# Patient Record
Sex: MALE | Race: WHITE | NOT HISPANIC OR LATINO | Employment: FULL TIME | URBAN - METROPOLITAN AREA
[De-identification: names, ages, dates, MRNs, and addresses within clinical notes are randomized per-mention and may not be internally consistent; named-entity substitution may affect disease eponyms.]

---

## 2017-02-06 DIAGNOSIS — I10 HTN (HYPERTENSION): Primary | ICD-10-CM

## 2017-02-06 RX ORDER — METOPROLOL TARTRATE 50 MG
25 TABLET ORAL 2 TIMES DAILY
Qty: 90 TABLET | Refills: 0 | Status: SHIPPED | OUTPATIENT
Start: 2017-02-06 | End: 2017-05-04

## 2017-05-04 ENCOUNTER — OFFICE VISIT (OUTPATIENT)
Dept: CARDIOLOGY | Facility: CLINIC | Age: 57
End: 2017-05-04
Payer: COMMERCIAL

## 2017-05-04 VITALS
BODY MASS INDEX: 28.34 KG/M2 | SYSTOLIC BLOOD PRESSURE: 96 MMHG | HEART RATE: 64 BPM | WEIGHT: 232.7 LBS | DIASTOLIC BLOOD PRESSURE: 62 MMHG | HEIGHT: 76 IN

## 2017-05-04 DIAGNOSIS — I51.9 LV DYSFUNCTION: ICD-10-CM

## 2017-05-04 DIAGNOSIS — I48.19 PERSISTENT ATRIAL FIBRILLATION (H): Primary | ICD-10-CM

## 2017-05-04 DIAGNOSIS — I10 ESSENTIAL HYPERTENSION: ICD-10-CM

## 2017-05-04 PROCEDURE — 99213 OFFICE O/P EST LOW 20 MIN: CPT | Performed by: INTERNAL MEDICINE

## 2017-05-04 RX ORDER — METOPROLOL TARTRATE 50 MG
25 TABLET ORAL 2 TIMES DAILY
Qty: 90 TABLET | Refills: 0 | Status: SHIPPED | OUTPATIENT
Start: 2017-05-04 | End: 2017-05-22

## 2017-05-04 RX ORDER — LISINOPRIL 2.5 MG/1
2.5 TABLET ORAL DAILY
Qty: 90 TABLET | Refills: 3 | Status: SHIPPED | OUTPATIENT
Start: 2017-05-04 | End: 2017-05-22

## 2017-05-04 NOTE — LETTER
5/4/2017    Marc Mahmood MD  [facility]   [address] [facility] [address]  [facility] [address]    RE: John Patel       Dear Colleague,    I had the pleasure of seeing John Patel in the [facility].    REASON FOR VISIT:  Evaluation of persistent atrial fibrillation.      Ms. Patel is a pleasant 57-year-old gentleman with a history of persistent atrial fibrillation who is here for evaluation.      The patient was initially found to have atrial fibrillation on a routine physical exam (10/2015).  At that time, echo revealed mild cardiomyopathy (EF around 40%-45%).  He was started on amiodarone and Eliquis and underwent cardioversion in 12/2015.  Post cardioversion, he had a recurrence of atrial fibrillation and since he was asymptomatic and failed amiodarone therapy, a decision was made to continue with rate control strategy.        I last saw him in March and at that time he was doing well and I repeated an echo, which confirmed LV dysfunction despite good controlled heart rate.  He was then started on lisinopril.      At the moment he affirms that he continues to do well and swims on a regular basis; however, he does complains of being more fatigued than usual.  When he plays golf, he feels extremely fatigued and needs a couple days to recover.      Outpatient Encounter Prescriptions as of 5/4/2017   Medication Sig Dispense Refill     [DISCONTINUED] lisinopril (PRINIVIL/ZESTRIL) 2.5 MG tablet Take 1 tablet (2.5 mg) by mouth daily 90 tablet 3     [DISCONTINUED] metoprolol (LOPRESSOR) 50 MG tablet Take 0.5 tablets (25 mg) by mouth 2 times daily 90 tablet 0     [DISCONTINUED] aspirin 81 MG tablet Take 81 mg by mouth daily       Multiple Vitamins-Minerals (DAILY MULTIVITAMIN PO) Take by mouth daily       Ascorbic Acid (VITAMIN C PO) Take by mouth daily        Omega-3 Fatty Acids (OMEGA-3 FISH OIL PO) Take 2 g by mouth daily        [DISCONTINUED] metoprolol  (LOPRESSOR) 50 MG tablet Take 0.5 tablets (25 mg) by mouth 2 times daily 90 tablet 0     [DISCONTINUED] oxyCODONE-acetaminophen (PERCOCET) 5-325 MG per tablet Take 1-2 tablets by mouth every 4 hours as needed for pain (moderate to severe) 30 tablet 0     [DISCONTINUED] lisinopril (PRINIVIL,ZESTRIL) 2.5 MG tablet Take 1 tablet (2.5 mg) by mouth daily 90 tablet 3     No facility-administered encounter medications on file as of 5/4/2017.      PLAN:   1.  Persistent atrial fibrillation on rate control strategy.  I recommend repeating a monitor and echocardiogram.  We will continue current medical therapy for now until we get the results of the studies.   2.  Cardiomyopathy.  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  A previous stress echo was negative for ischemia.  We will repeat an echocardiogram.  Continue metoprolol.   3.  CHADS-VASc score of 1 (cardiomyopathy).  We will reevaluate cardiac function.  If his LV function is still depressed, we may consider switching to a blood thinner.   4.  Followup care.  We will discuss followup after echo results.     Again, thank you for allowing me to participate in the care of your patient.      Sincerely,    Dimitri Jones MD     [facility]

## 2017-05-04 NOTE — PROGRESS NOTES
"HPI and Plan:   See dictation  [id number]    Physical Exam:  Vitals: BP 96/62  Pulse 64  Ht 1.93 m (6' 4\")  Wt 105.6 kg (232 lb 11.2 oz)  BMI 28.33 kg/m2    Constitutional:  AAO x3.  Pt is in NAD.  HEAD: normocephalic.  SKIN: Skin normal color, texture and turgor with no lesions or eruptions.  Eyes: PERRL, EOMI.  ENT:  Supple, normal JVP. No lymphadenopathy or thyroid enlargement.  Chest:  CTAB.  Cardiac: RRR, normal  S1 and S2.  No murmurs rubs or gallop.    Abdomen:  Normal BS.  Soft, non-tender and non-distended.  No rebound or guarding.    Extremities:  Pedious pulses palpable B/L.  No LE edema noticed.   Neurological: Strength and sensation grossly symmetric and intact throughout.       CURRENT MEDICATIONS:  Current Outpatient Prescriptions   Medication Sig Dispense Refill     lisinopril (PRINIVIL/ZESTRIL) 2.5 MG tablet Take 1 tablet (2.5 mg) by mouth daily 90 tablet 3     metoprolol (LOPRESSOR) 50 MG tablet Take 0.5 tablets (25 mg) by mouth 2 times daily 90 tablet 0     aspirin 81 MG tablet Take 81 mg by mouth daily       Multiple Vitamins-Minerals (DAILY MULTIVITAMIN PO) Take by mouth daily       Ascorbic Acid (VITAMIN C PO) Take by mouth daily        Omega-3 Fatty Acids (OMEGA-3 FISH OIL PO) Take 2 g by mouth daily        [DISCONTINUED] metoprolol (LOPRESSOR) 50 MG tablet Take 0.5 tablets (25 mg) by mouth 2 times daily 90 tablet 0     [DISCONTINUED] lisinopril (PRINIVIL,ZESTRIL) 2.5 MG tablet Take 1 tablet (2.5 mg) by mouth daily 90 tablet 3       ALLERGIES     Allergies   Allergen Reactions     No Known Drug Allergies        PAST MEDICAL HISTORY:  Past Medical History:   Diagnosis Date     Allergic rhinitis, cause unspecified      Arrhythmia      Atrial fibrillation (H)      Cardiomyopathy (H)      Colon polyp     pre-cancerous and non-cancerous     Hypertension     [facility]     Snores        PAST SURGICAL HISTORY:  Past Surgical History:   Procedure Laterality Date     ANESTHESIA CARDIOVERSION N/A " 2015    Procedure: ANESTHESIA CARDIOVERSION;  Surgeon: GENERIC ANESTHESIA PROVIDER;  Location: [facility] OR     COLONOSCOPY N/A 11/10/2015    Procedure: COLONOSCOPY;  Surgeon: Edin Wallace MD, MD;  Location: [facility] GI     HERNIORRHAPHY INGUINAL  2011    Procedure:HERNIORRHAPHY INGUINAL; LEFT INGUINAL HERNIA REPAIR WITH MESH; Surgeon:SYLVESTER KOEHLER; Location: [facility]     HERNIORRHAPHY INGUINAL Right 2016    Procedure: HERNIORRHAPHY INGUINAL;  Surgeon: Sixto Wolf MD;  Location: [facility] OR     ORTHOPEDIC SURGERY  ~    bunionectomy on right       FAMILY HISTORY:  Family History   Problem Relation Age of Onset     DIABETES Father       age 90     Respiratory Father      COPD-exsmoker     Breast Cancer Mother      DIABETES Mother      Born 1929     DIABETES Brother      Oldest brother has DM     Thyroid Disease Brother      Has 4 siblings with thyroid issues     CANCER Brother      Lung cancer, not a smoker, rare type     Depression Brother      Depression Sister      One sister with depression     Thyroid Disease Sister        SOCIAL HISTORY:  Social History     Social History     Marital status:      Spouse name: Calista Topete     Number of children: 2     Years of education: N/A     Occupational History     Manager of bus garage      [employer]     Social History Main Topics     Smoking status: Never Smoker     Smokeless tobacco: Never Used     Alcohol use 0.0 oz/week     0 Standard drinks or equivalent per week      Comment: 2-3 drink weekly     Drug use: No     Sexual activity: Yes     Partners: Female     Other Topics Concern     Parent/Sibling W/ Cabg, Mi Or Angioplasty Before 65f 55m? No     Caffeine Concern No     3-4 cups of coffee     Sleep Concern Yes     Snores per wife     Stress Concern Yes     Job staffing issues     Special Diet Yes     less sweets     Exercise Yes     Swimming and walking     Seat Belt Yes     Social History Narrative    Swims at  [employer], about one mile/35 minutes, 3-4 times weekly.        Review of Systems:  Skin:  Negative     Eyes:  Negative    ENT:  Negative    Respiratory:  Positive for dyspnea on exertion (pt feels this is getting worse lately )  Cardiovascular:    Positive for;lightheadedness;palpitations (pt is always in afib)  Gastroenterology: Negative    Genitourinary:  Negative    Musculoskeletal:  Negative    Neurologic:  Positive for tremors (right hand is worse )  Psychiatric:  Positive for excessive stress  Heme/Lymph/Imm:  Negative    Endocrine:  Negative        Recent Lab Results:  LIPID RESULTS:  Lab Results   Component Value Date    CHOL 145 2016    HDL 50 2016    LDL 78 2016    TRIG 87 2016    CHOLHDLRATIO 2.2 10/20/2015       LIVER ENZYME RESULTS:  Lab Results   Component Value Date    AST 24 2016    ALT 31 2016       CBC RESULTS:  Lab Results   Component Value Date    WBC 7.9 2016    RBC 4.73 2016    HGB 14.8 2016    HCT 44.1 2016    MCV 93 2016    MCH 31.3 2016    MCHC 33.6 2016    RDW 12.3 2016     2016       BMP RESULTS:  Lab Results   Component Value Date     2016    POTASSIUM 4.5 2016    CHLORIDE 106 2016    CO2 29 2016    ANIONGAP 3 2016    GLC 81 2016    BUN 18 2016    CR 1.06 2016    GFRESTIMATED 72 2016    GFRESTBLACK 88 2016    BENSON 9.0 2016        A1C RESULTS:  No results found for: A1C    INR RESULTS:  No results found for: INR      ECHOCARDIOGRAM  Recent Results (from the past 8760 hour(s))   Echo Limited With Optison    Narrative    Interpretation Summary                    [facility]  Echocardiography Laboratory  [address] [place] [address]        Name: JEREMÍAS ABDULLAHI  MRN: [id number]  : 1960  Study Date: 2016 12:16 PM  Age: 56 yrs  Gender: Male  [place] [facility] [place] For Study:  Persistent atrial fibrillation  Ordering Physician: NAUN MCGARRY  Referring Physician: Marc Mahmood  Performed By: Gertrudis Levy,     BSA: 2.3 m2  Height: 76 in  Weight: 219 lb  HR: 68  BP: 90/60 mmHg  ______________________________________________________________________________        Procedure  Limited Echo Adult. Contrast Optison.  ______________________________________________________________________________     Interpretation Summary     The visual ejection fraction is estimated at 40-45%.  The right ventricle is normal in size and function.  Compared to prior study, there is no significant change.  ______________________________________________________________________________           Left Ventricle  The left ventricle is normal in size. The visual ejection fraction is  estimated at 40-45%. There is mild-moderate global hypokinesia of the left  ventricle.     Right Ventricle  The right ventricle is normal size. The right ventricle is normal in size and  function.  Atria  Normal left atrial size. Right atrial size is normal. There is no atrial  shunt seen.     Mitral Valve  The mitral valve is normal in structure and function. There is trace mitral  regurgitation.     Tricuspid Valve  The tricuspid valve is normal in structure and function. There is trace  tricuspid regurgitation.     Aortic Valve  The aortic valve is trileaflet. No aortic regurgitation is present. No aortic  stenosis is present.     Pulmonic Valve  The pulmonic valve is not well seen, but is grossly normal. There is trace  pulmonic valvular regurgitation.     Vessels  The inferior vena cava is not dilated.  Pericardium  There is no pericardial effusion.     Rhythm  The rhythm was atrial fibrillation.     ______________________________________________________________________________           Doppler Measurements & Calculations  MV E max zayra: 60.3 cm/sec  MV dec time: 0.19 sec  TR max zayra: 157.9 cm/sec  TR max PG: 10.0 mmHg            ______________________________________________________________________________        Report approved by: Randall Jane 05/24/2016 03:07 PM            Orders Placed This Encounter   Procedures     Zio Patch Monitor     Echocardiogram Limited     Orders Placed This Encounter   Medications     lisinopril (PRINIVIL/ZESTRIL) 2.5 MG tablet     Sig: Take 1 tablet (2.5 mg) by mouth daily     Dispense:  90 tablet     Refill:  3     metoprolol (LOPRESSOR) 50 MG tablet     Sig: Take 0.5 tablets (25 mg) by mouth 2 times daily     Dispense:  90 tablet     Refill:  0     Medications Discontinued During This Encounter   Medication Reason     oxyCODONE-acetaminophen (PERCOCET) 5-325 MG per tablet      lisinopril (PRINIVIL,ZESTRIL) 2.5 MG tablet Reorder     metoprolol (LOPRESSOR) 50 MG tablet Reorder         Encounter Diagnoses   Name Primary?     Persistent atrial fibrillation (H) Yes     LV dysfunction      Essential hypertension          CC  No referring provider defined for this encounter.

## 2017-05-04 NOTE — MR AVS SNAPSHOT
After Visit Summary   5/4/2017    John Patel    MRN: [id number]           Patient Information     Date Of Birth          1960        Visit Information        Provider Department      5/4/2017 2:15 PM Dimitri Jones MD [facility] [place]        Today's Diagnoses     Persistent atrial fibrillation (H)    -  1    LV dysfunction        Essential hypertension           Follow-ups after your visit        Your next 10 appointments already scheduled     May 05, 2017  3:00 PM CDT   Ech Limited with [id number]   [facility] CV Echocardiography (Cardiovascular Imaging at [facility])    [address]  [place] [address] [phone]           1.  Please bring or wear a comfortable two-piece outfit. 2.  You may eat, drink and take your normal medicines. 3.  For any questions that cannot be answered, please contact the ordering physician            May 05, 2017  4:00 PM CDT   ZIOPATCH MONITOR with MON   [facility] Radiology - P Heart Imaging ([facility])    [address]  [place] [address] [phone]              Future tests that were ordered for you today     Open Future Orders        Priority Expected Expires Ordered    Echocardiogram Limited Routine 5/11/2017 5/4/2018 5/4/2017    Zio Patch Monitor Routine 5/11/2017 5/4/2018 5/4/2017            Who to contact     If you have questions or need follow up information about today's clinic visit or your schedule please contact [facility] [place] directly at [phone].  Normal or non-critical lab and imaging results will be communicated to you by MyChart, letter or phone within 4 business days after the clinic has received the results. If you do not hear from us within 7 days, please contact the clinic through MyChart or phone. If you have a critical or abnormal lab result, we will notify  "you by phone as soon as possible.  Submit refill requests through [employer] or call your pharmacy and they will forward the refill request to us. Please allow 3 business days for your refill to be completed.          Additional Information About Your Visit        [employer] Information     [employer] gives you secure access to your electronic health record. If you see a primary care provider, you can also send messages to your care team and make appointments. If you have questions, please call your primary care clinic.  If you do not have a primary care provider, please call [phone] and they will assist you.        Care EveryWhere ID     This is your Care EveryWhere ID. This could be used by other organizations to access your [place] medical records  [id number]        Your Vitals Were     Pulse Height BMI (Body Mass Index)             64 1.93 m (6' 4\") 28.33 kg/m2          Blood Pressure from Last 3 Encounters:   05/04/17 96/62   09/13/16 104/66   08/30/16 130/65    Weight from Last 3 Encounters:   05/04/17 105.6 kg (232 lb 11.2 oz)   09/13/16 99.3 kg (219 lb)   08/30/16 99.3 kg (219 lb)                 Today's Medication Changes          These changes are accurate as of: 5/4/17  3:07 PM.  If you have any questions, ask your nurse or doctor.               Stop taking these medicines if you haven't already. Please contact your care team if you have questions.     oxyCODONE-acetaminophen 5-325 MG per tablet   Commonly known as:  PERCOCET   Stopped by:  Dimitri Jones MD                Where to get your medicines      These medications were sent to [facility] [place] [id number] [facility]  [address] [facility] [address]     Phone:  [phone]     lisinopril 2.5 MG tablet    metoprolol 50 MG tablet                Primary Care Provider Office Phone # Fax #    Marc Mahmood [phone]       [place] [address] [place] " [address]        Thank you!     Thank you for choosing [facility] [place]  for your care. Our goal is always to provide you with excellent care. Hearing back from our patients is one way we can continue to improve our services. Please take a few minutes to complete the written survey that you may receive in the mail after your visit with us. Thank you!             Your Updated Medication List - Protect others around you: Learn how to safely use, store and throw away your medicines at www.disposemymeds.org.          This list is accurate as of: 5/4/17  3:07 PM.  Always use your most recent med list.                   Brand Name Dispense Instructions for use    aspirin 81 MG tablet      Take 81 mg by mouth daily       DAILY MULTIVITAMIN PO      Take by mouth daily       lisinopril 2.5 MG tablet    PRINIVIL/Zestril    90 tablet    Take 1 tablet (2.5 mg) by mouth daily       metoprolol 50 MG tablet    LOPRESSOR    90 tablet    Take 0.5 tablets (25 mg) by mouth 2 times daily       OMEGA-3 FISH OIL PO      Take 2 g by mouth daily       VITAMIN C PO      Take by mouth daily

## 2017-05-05 ENCOUNTER — HOSPITAL ENCOUNTER (OUTPATIENT)
Dept: CARDIOLOGY | Facility: CLINIC | Age: 57
End: 2017-05-05
Attending: INTERNAL MEDICINE
Payer: COMMERCIAL

## 2017-05-05 ENCOUNTER — HOSPITAL ENCOUNTER (OUTPATIENT)
Dept: CARDIOLOGY | Facility: CLINIC | Age: 57
Discharge: HOME | End: 2017-05-05
Attending: INTERNAL MEDICINE | Admitting: INTERNAL MEDICINE
Payer: COMMERCIAL

## 2017-05-05 DIAGNOSIS — I48.19 PERSISTENT ATRIAL FIBRILLATION (H): ICD-10-CM

## 2017-05-05 PROCEDURE — 25500064 ZZH RX 255 OP 636: Performed by: INTERNAL MEDICINE

## 2017-05-05 PROCEDURE — 0298T ZZC EXT ECG > 48HR TO 21 DAY REVIEW AND INTERPRETATN: CPT | Performed by: INTERNAL MEDICINE

## 2017-05-05 PROCEDURE — 93325 DOPPLER ECHO COLOR FLOW MAPG: CPT | Mod: 26 | Performed by: INTERNAL MEDICINE

## 2017-05-05 PROCEDURE — 93321 DOPPLER ECHO F-UP/LMTD STD: CPT | Mod: 26 | Performed by: INTERNAL MEDICINE

## 2017-05-05 PROCEDURE — 93308 TTE F-UP OR LMTD: CPT | Mod: 26 | Performed by: INTERNAL MEDICINE

## 2017-05-05 PROCEDURE — 0296T ZIO PATCH HOLTER: CPT

## 2017-05-05 PROCEDURE — 93308 TTE F-UP OR LMTD: CPT

## 2017-05-05 RX ADMIN — SULFUR HEXAFLUORIDE 5 ML: KIT at 15:22

## 2017-05-05 NOTE — PROGRESS NOTES
REASON FOR VISIT:  Evaluation of persistent atrial fibrillation.      HISTORY OF PRESENT ILLNESS:  Ms. Abdullahi is a pleasant 57-year-old gentleman with a history of persistent atrial fibrillation who is here for evaluation.      The patient was initially found to have atrial fibrillation on a routine physical exam (10/2015).  At that time, echo revealed mild cardiomyopathy (EF around 40%-45%).  He was started on amiodarone and Eliquis and underwent cardioversion in 2015.  Post cardioversion, he had a recurrence of atrial fibrillation and since he was asymptomatic and failed amiodarone therapy, a decision was made to continue with rate control strategy.        I last saw him in March and at that time he was doing well and I repeated an echo, which confirmed LV dysfunction despite good controlled heart rate.  He was then started on lisinopril.      At the moment he affirms that he continues to do well and swims on a regular basis; however, he does complains of being more fatigued than usual.  When he plays golf, he feels extremely fatigued and needs a couple days to recover.      PLAN:   1.  Persistent atrial fibrillation on rate control strategy.  I recommend repeating a monitor and echocardiogram.  We will continue current medical therapy for now until we get the results of the studies.   2.  Cardiomyopathy.  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  A previous stress echo was negative for ischemia.  We will repeat an echocardiogram.  Continue metoprolol.   3.  CHADS-VASc score of 1 (cardiomyopathy).  We will reevaluate cardiac function.  If his LV function is still depressed, we may consider switching to a blood thinner.   4.  Followup care.  We will discuss followup after echo results.         NAUN MCGARRY MD             D: 2017 15:06   T: 2017 11:31   MT: CELIO      Name:     JEREMÍAS ABDULLAHI   MRN:      [id number]        Account:      [id number]   :      1960           Service  Date: 05/04/2017      Document: [id number]

## 2017-05-09 ENCOUNTER — TELEPHONE (OUTPATIENT)
Dept: CARDIOLOGY | Facility: CLINIC | Age: 57
End: 2017-05-09

## 2017-05-09 NOTE — TELEPHONE ENCOUNTER
Called pt and informed him of reduction in EF per echo. He has worn ziopatch monitor and will await results of monitor to help guide treatment plan per Dr Jones.

## 2017-05-17 ENCOUNTER — OFFICE VISIT (OUTPATIENT)
Dept: CARDIOLOGY | Facility: CLINIC | Age: 57
End: 2017-05-17
Payer: COMMERCIAL

## 2017-05-17 ENCOUNTER — TELEPHONE (OUTPATIENT)
Dept: CARDIOLOGY | Facility: CLINIC | Age: 57
End: 2017-05-17

## 2017-05-17 VITALS
HEIGHT: 76 IN | BODY MASS INDEX: 28.46 KG/M2 | DIASTOLIC BLOOD PRESSURE: 75 MMHG | SYSTOLIC BLOOD PRESSURE: 106 MMHG | HEART RATE: 66 BPM | WEIGHT: 233.7 LBS

## 2017-05-17 DIAGNOSIS — I48.91 ATRIAL FIBRILLATION, UNSPECIFIED TYPE (H): Primary | ICD-10-CM

## 2017-05-17 PROCEDURE — 99214 OFFICE O/P EST MOD 30 MIN: CPT | Performed by: INTERNAL MEDICINE

## 2017-05-17 RX ORDER — AMIODARONE HYDROCHLORIDE 400 MG/1
TABLET ORAL
Qty: 30 TABLET | Refills: 3 | Status: SHIPPED | OUTPATIENT
Start: 2017-05-17 | End: 2017-05-22

## 2017-05-17 NOTE — PROGRESS NOTES
"[id number]    HPI and Plan:   See dictation      Physical Exam:  Vitals: /75  Pulse 66  Ht 1.93 m (6' 4\")  Wt 106 kg (233 lb 11.2 oz)  BMI 28.45 kg/m2    Constitutional:  AAO x3.  Pt is in NAD.  HEAD: normocephalic.  SKIN: Skin normal color, texture and turgor with no lesions or eruptions.  Eyes: PERRL, EOMI.  ENT:  Supple, normal JVP. No lymphadenopathy or thyroid enlargement.  Chest:  CTAB.  Cardiac:  IIR, variable sound of S1 and Normal S2.  No murmurs rubs or gallop.   Abdomen:  Normal BS.  Soft, non-tender and non-distended.  No rebound or guarding.    Extremities:  Pedious pulses palpable B/L.  No LE edema noticed.   Neurological: Strength and sensation grossly symmetric and intact throughout.       CURRENT MEDICATIONS:  Current Outpatient Prescriptions   Medication Sig Dispense Refill     amiodarone HCl 400 MG TABS Amiodarone 400 mg PO BID for 4 days, then decrease to 400 mg PO daily for 4 days, and then decrease to 200 mg PO daily. 30 tablet 3     apixaban ANTICOAGULANT (ELIQUIS) 5 MG tablet Take 1 tablet (5 mg) by mouth 2 times daily 60 tablet 3     lisinopril (PRINIVIL/ZESTRIL) 2.5 MG tablet Take 1 tablet (2.5 mg) by mouth daily 90 tablet 3     metoprolol (LOPRESSOR) 50 MG tablet Take 0.5 tablets (25 mg) by mouth 2 times daily 90 tablet 0     aspirin 81 MG tablet Take 81 mg by mouth daily       Multiple Vitamins-Minerals (DAILY MULTIVITAMIN PO) Take by mouth daily       Ascorbic Acid (VITAMIN C PO) Take by mouth daily        Omega-3 Fatty Acids (OMEGA-3 FISH OIL PO) Take 2 g by mouth daily          ALLERGIES     Allergies   Allergen Reactions     No Known Drug Allergies        PAST MEDICAL HISTORY:  Past Medical History:   Diagnosis Date     Allergic rhinitis, cause unspecified      Arrhythmia      Atrial fibrillation (H)      Cardiomyopathy (H)      Colon polyp     pre-cancerous and non-cancerous     Hypertension     [facility]     Snores        PAST SURGICAL HISTORY:  Past Surgical History: "   Procedure Laterality Date     ANESTHESIA CARDIOVERSION N/A 2015    Procedure: ANESTHESIA CARDIOVERSION;  Surgeon: GENERIC ANESTHESIA PROVIDER;  Location: [facility] OR     COLONOSCOPY N/A 11/10/2015    Procedure: COLONOSCOPY;  Surgeon: Edin Wallace MD, MD;  Location: [facility] GI     HERNIORRHAPHY INGUINAL  2011    Procedure:HERNIORRHAPHY INGUINAL; LEFT INGUINAL HERNIA REPAIR WITH MESH; Surgeon:SYLVESTER KOEHLER; Location: [facility]     HERNIORRHAPHY INGUINAL Right 2016    Procedure: HERNIORRHAPHY INGUINAL;  Surgeon: Sixto Wolf MD;  Location: [facility] OR     ORTHOPEDIC SURGERY  ~    bunionectomy on right       FAMILY HISTORY:  Family History   Problem Relation Age of Onset     DIABETES Father       age 90     Respiratory Father      COPD-exsmoker     Breast Cancer Mother      DIABETES Mother      Born 1929     DIABETES Brother      Oldest brother has DM     Depression Sister      One sister with depression     Thyroid Disease Sister      Thyroid Disease Brother      Has 4 siblings with thyroid issues     CANCER Brother      Lung cancer, not a smoker, rare type     Depression Brother        SOCIAL HISTORY:  Social History     Social History     Marital status:      Spouse name: Calista Topete     Number of children: 2     Years of education: N/A     Occupational History     Manager of bus garage      [employer]     Social History Main Topics     Smoking status: Never Smoker     Smokeless tobacco: Never Used     Alcohol use 0.0 oz/week     0 Standard drinks or equivalent per week      Comment: 2-3 drink weekly     Drug use: No     Sexual activity: Yes     Partners: Female     Other Topics Concern     Parent/Sibling W/ Cabg, Mi Or Angioplasty Before 65f 55m? No     Caffeine Concern No     3-4 cups of coffee     Sleep Concern Yes     Snores per wife     Stress Concern Yes     Job staffing issues     Special Diet Yes     less sweets     Exercise Yes     Swimming and walking      Seat Belt Yes     Social History Narrative    Swims at [facility], about one mile/35 minutes, 3-4 times weekly.        Review of Systems:  Skin:  Negative     Eyes:  Negative    ENT:  Negative    Respiratory:  Positive for dyspnea on exertion  Cardiovascular:    Positive for;lightheadedness;palpitations;chest pain;dizziness;syncope or near-syncope  Gastroenterology: Negative    Genitourinary:  Negative    Musculoskeletal:  Negative    Neurologic:  Positive for tremors  Psychiatric:  Positive for excessive stress  Heme/Lymph/Imm:  Positive for night sweats  Endocrine:  Negative        Recent Lab Results:  LIPID RESULTS:  Lab Results   Component Value Date    CHOL 145 08/18/2016    HDL 50 08/18/2016    LDL 78 08/18/2016    TRIG 87 08/18/2016    CHOLHDLRATIO 2.2 10/20/2015       LIVER ENZYME RESULTS:  Lab Results   Component Value Date    AST 24 08/18/2016    ALT 31 08/18/2016       CBC RESULTS:  Lab Results   Component Value Date    WBC 7.9 08/18/2016    RBC 4.73 08/18/2016    HGB 14.8 08/18/2016    HCT 44.1 08/18/2016    MCV 93 08/18/2016    MCH 31.3 08/18/2016    MCHC 33.6 08/18/2016    RDW 12.3 08/18/2016     08/18/2016       BMP RESULTS:  Lab Results   Component Value Date     08/18/2016    POTASSIUM 4.5 08/18/2016    CHLORIDE 106 08/18/2016    CO2 29 08/18/2016    ANIONGAP 3 08/18/2016    GLC 81 08/18/2016    BUN 18 08/18/2016    CR 1.06 08/18/2016    GFRESTIMATED 72 08/18/2016    GFRESTBLACK 88 08/18/2016    BENSON 9.0 08/18/2016        A1C RESULTS:  No results found for: A1C    INR RESULTS:  No results found for: INR      ECHOCARDIOGRAM  Recent Results (from the past 8760 hour(s))   Echocardiogram Limited    Narrative    [id number]  [facility]  [id number]  [id number]^ZEFERINO^NAUN^COSTA        [facility]  [address]  [place] [address]  Phone [phone]  Fax [phone]        Name: JEREMÍAS ABDULLAHI  APRIL  MRN: [id number]  : 1960  Study Date: 2017 02:47 PM  Age: 57 yrs  Gender: Male  Patient Location: [facility]  Reason For Study: Persistent atrial fibrillation  Ordering Physician: NAUN MCGARRY  Referring Physician: Marc Mahmood  Performed By: Aubrey Vasquez [facility]     BSA: 2.4 m2  Height: 76 in  Weight: 232 lb  HR: 91  BP: 116/68 mmHg  _____________________________________________________________________________  __     Procedure  Limited Echo Adult. Contrast Lumason.     _____________________________________________________________________________  __        Interpretation Summary     Left ventricular systolic function is moderately reduced.  The visual ejection fraction is estimated at 30-35%.  The right ventricular systolic function is mild to moderately reduced.  Compared to 2016, EF is lower. RV function is now abnormal. The study was  technically difficult. The study was technically limited.  _____________________________________________________________________________  __        Left Ventricle  The visual ejection fraction is estimated at 30-35%. Left ventricular systolic  function is moderately reduced. There is moderate global hypokinesia of the  left ventricle. Septal motion is consistent with conduction abnormality. There  is no thrombus seen in the left ventricle.     Right Ventricle  The right ventricle is mildly dilated. The right ventricular systolic function  is mild to moderately reduced.     Mitral Valve  There is trace mitral regurgitation.     Tricuspid Valve  There is trace tricuspid regurgitation. Right ventricular systolic pressure  could not be approximated due to inadequate tricuspid regurgitation.     Pulmonic Valve  There is trace pulmonic valvular regurgitation.        Rhythm  The rhythm was atrial fibrillation with wide QRS rhythm.  _____________________________________________________________________________  __                                Report approved by:  Viviana Dalal 2017 03:44 PM                    _____________________________________________________________________________  __      Echo Limited With Optison    Narrative    Interpretation Summary                    [facility]  Echocardiography Laboratory  [address] [place] [address]        Name: JEREMÍAS ABDULLAHI  MRN: [id number]  : 1960  Study Date: 2016 12:16 PM  Age: 56 yrs  Gender: Male  [place] [facility]  Reason For Study: Persistent atrial fibrillation  Ordering Physician: NAUN MCGARRY  Referring Physician: Marc Mahmood  Performed By: Gertrudis Levy,     BSA: 2.3 m2  Height: 76 in  Weight: 219 lb  HR: 68  BP: 90/60 mmHg  ______________________________________________________________________________        Procedure  Limited Echo Adult. Contrast Optison.  ______________________________________________________________________________     Interpretation Summary     The visual ejection fraction is estimated at 40-45%.  The right ventricle is normal in size and function.  Compared to prior study, there is no significant change.  ______________________________________________________________________________           Left Ventricle  The left ventricle is normal in size. The visual ejection fraction is  estimated at 40-45%. There is mild-moderate global hypokinesia of the left  ventricle.     Right Ventricle  The right ventricle is normal size. The right ventricle is normal in size and  function.  Atria  Normal left atrial size. Right atrial size is normal. There is no atrial  shunt seen.     Mitral Valve  The mitral valve is normal in structure and function. There is trace mitral  regurgitation.     Tricuspid Valve  The tricuspid valve is normal in structure and function. There is trace  tricuspid regurgitation.     Aortic Valve  The aortic valve is trileaflet. No aortic regurgitation is present. No aortic  stenosis is present.     Pulmonic  Valve  The pulmonic valve is not well seen, but is grossly normal. There is trace  pulmonic valvular regurgitation.     Vessels  The inferior vena cava is not dilated.  Pericardium  There is no pericardial effusion.     Rhythm  The rhythm was atrial fibrillation.     ______________________________________________________________________________           Doppler Measurements & Calculations  MV E max zayra: 60.3 cm/sec  MV dec time: 0.19 sec  TR max zayra: 157.9 cm/sec  TR max PG: 10.0 mmHg           ______________________________________________________________________________        Report approved by: Randall Jane 05/24/2016 03:07 PM            Orders Placed This Encounter   Procedures     Follow-Up with Cardiac Advanced Practice Provider     EKG 12-lead complete w/read - Clinics (to be scheduled)     Orders Placed This Encounter   Medications     amiodarone HCl 400 MG TABS     Sig: Amiodarone 400 mg PO BID for 4 days, then decrease to 400 mg PO daily for 4 days, and then decrease to 200 mg PO daily.     Dispense:  30 tablet     Refill:  3     apixaban ANTICOAGULANT (ELIQUIS) 5 MG tablet     Sig: Take 1 tablet (5 mg) by mouth 2 times daily     Dispense:  60 tablet     Refill:  3     There are no discontinued medications.      Encounter Diagnosis   Name Primary?     Atrial fibrillation, unspecified type (H) Yes         CC  No referring provider defined for this encounter.

## 2017-05-17 NOTE — TELEPHONE ENCOUNTER
----- Message from Dimitri Jones MD sent at 5/16/2017  5:12 PM CDT -----  Ziopatch confirmed Afib with RVR.  He should f/u with me to discuss results and plan of action.  Options are either continuer HR control and add digoxin or pursue rhythm control with Amio/ CDV.    Dimitri

## 2017-05-17 NOTE — TELEPHONE ENCOUNTER
Patient informed of ziopatch findings and Dr. Jones's recommendation for OV to discuss plan of care. Patient verbalized patient's anxiousness at the results as patient was symptomatic. Patient offered appointment today. No further questions/concerns. Anna Marie

## 2017-05-17 NOTE — LETTER
5/17/2017    Marc Mahmood MD  [address] [facility] [address]  [facility] [address]    RE: John Patel       Dear Colleague,    I had the pleasure of seeing John Patel in the [facility].    REASON FOR VISIT:  Evaluation of atrial fibrillation.      Mr. Patel is a pleasant 57-year-old gentleman with history of persistent atrial fibrillation who is here for evaluation.      The patient was recently found to have atrial fibrillation on a routine physical (10/2015).  At that time, he showed signs of mild cardiomyopathy with EF around 40-45%.  He was started on amiodarone and Eliquis and underwent cardioversion in 12/2015.  Post-cardioversion, he had recurrence of atrial fibrillation.  Since he was asymptomatic, a decision was made to continue rate control strategy.      I last saw him 05/05/2017.  At that time he was complaining of more symptoms of fatigue and palpitation.  An echocardiogram was repeated and revealed worsening cardiac function (EF around 30-35%).  ZIO Patch monitor was obtained which revealed atrial fibrillation with poor control of ventricular response, having several episodes of rapid ventricular response.     Outpatient Encounter Prescriptions as of 5/17/2017   Medication Sig Dispense Refill     [DISCONTINUED] amiodarone HCl 400 MG TABS Amiodarone 400 mg PO BID for 4 days, then decrease to 400 mg PO daily for 4 days, and then decrease to 200 mg PO daily. 30 tablet 3     [DISCONTINUED] apixaban ANTICOAGULANT (ELIQUIS) 5 MG tablet Take 1 tablet (5 mg) by mouth 2 times daily 60 tablet 3     [DISCONTINUED] lisinopril (PRINIVIL/ZESTRIL) 2.5 MG tablet Take 1 tablet (2.5 mg) by mouth daily 90 tablet 3     [DISCONTINUED] metoprolol (LOPRESSOR) 50 MG tablet Take 0.5 tablets (25 mg) by mouth 2 times daily 90 tablet 0     [DISCONTINUED] aspirin 81 MG tablet Take 81 mg by mouth daily       Multiple Vitamins-Minerals (DAILY MULTIVITAMIN PO) Take by mouth daily        Ascorbic Acid (VITAMIN C PO) Take by mouth daily        Omega-3 Fatty Acids (OMEGA-3 FISH OIL PO) Take 2 g by mouth daily        No facility-administered encounter medications on file as of 5/17/2017.       ASSESSMENT AND PLAN:  Persistent atrial fibrillation.  He is now symptomatic and showing signs of cardiomyopathy with deterioration of cardiac function.      During this visit, we discussed options including aggressive rate control, possibly starting digoxin to the medical regimen, or pursue another attempt of rhythm control strategy.  In that setting, I would recommend starting amiodarone and after a month of amiodarone and anticoagulation, I would pursue cardioversion.  We will consider catheter ablation procedure to help wean off amiodarone after cardioversion.      After discussion of the pros and cons of both options, he opted for another attempt of rhythm control.  We will start him on amiodarone.  We will continue metoprolol at current dose.  We will stop aspirin and start Eliquis.  He will follow up here in a month to evaluate EKG and potentially schedule for cardioversion.     Again, thank you for allowing me to participate in the care of your patient.      Sincerely,    Dimitri Jones MD     [facility]

## 2017-05-17 NOTE — MR AVS SNAPSHOT
After Visit Summary   5/17/2017    John Patel    MRN: [id number]           Patient Information     Date Of Birth          1960        Visit Information        Provider Department      5/17/2017 2:15 PM Dimitri Jones MD [facility]        Today's Diagnoses     Atrial fibrillation, unspecified type (H)    -  1       Follow-ups after your visit        Additional Services     Follow-Up with Cardiac Advanced Practice Provider                 Your next 10 appointments already scheduled     Jun 28, 2017  2:30 PM CDT   Return Visit with KHALIDA Fall CNP   [facility])    [address]  [facility] [address] [phone]              Future tests that were ordered for you today     Open Future Orders        Priority Expected Expires Ordered    EKG 12-lead complete w/read - Clinics (to be scheduled) Routine 6/16/2017 5/17/2018 5/17/2017    Follow-Up with Cardiac Advanced Practice Provider Routine 6/16/2017 5/17/2018 5/17/2017            Who to contact     If you have questions or need follow up information about today's clinic visit or your schedule please contact [facility] directly at [phone].  Normal or non-critical lab and imaging results will be communicated to you by MyChart, letter or phone within 4 business days after the clinic has received the results. If you do not hear from us within 7 days, please contact the clinic through [facility] or phone. If you have a critical or abnormal lab result, we will notify you by phone as soon as possible.  Submit refill requests through [employer] or call your pharmacy and they will forward the refill request to us. Please allow 3 business days for your refill to be completed.          Additional Information About Your Visit        MyChart Information     [facility]  "gives you secure access to your electronic health record. If you see a primary care provider, you can also send messages to your care team and make appointments. If you have questions, please call your primary care clinic.  If you do not have a primary care provider, please call [phone] and they will assist you.        Care EveryWhere ID     This is your Care EveryWhere ID. This could be used by other organizations to access your [facility] medical records  [id number]        Your Vitals Were     Pulse Height BMI (Body Mass Index)             66 1.93 m (6' 4\") 28.45 kg/m2          Blood Pressure from Last 3 Encounters:   05/17/17 106/75   05/04/17 96/62   09/13/16 104/66    Weight from Last 3 Encounters:   05/17/17 106 kg (233 lb 11.2 oz)   05/04/17 105.6 kg (232 lb 11.2 oz)   09/13/16 99.3 kg (219 lb)                 Today's Medication Changes          These changes are accurate as of: 5/17/17  2:43 PM.  If you have any questions, ask your nurse or doctor.               Start taking these medicines.        Dose/Directions    amiodarone HCl 400 MG Tabs   Used for:  Atrial fibrillation, unspecified type (H)   Started by:  Dimitri Jones MD        Amiodarone 400 mg PO BID for 4 days, then decrease to 400 mg PO daily for 4 days, and then decrease to 200 mg PO daily.   Quantity:  30 tablet   Refills:  3       apixaban ANTICOAGULANT 5 MG tablet   Commonly known as:  ELIQUIS   Used for:  Atrial fibrillation, unspecified type (H)   Started by:  Dimitri Jones MD        Dose:  5 mg   Take 1 tablet (5 mg) by mouth 2 times daily   Quantity:  60 tablet   Refills:  3            Where to get your medicines      These medications were sent to [facility] [address] [address]     Phone:  [phone]     amiodarone HCl 400 MG Tabs    apixaban ANTICOAGULANT 5 MG tablet                Primary Care Provider Office Phone # Fax #    Marc " ROCCO Mahmood -[phone]       [facility] [address] [facility] [address]  [facility] [address]        Thank you!     Thank you for choosing [facility] [place]  for your care. Our goal is always to provide you with excellent care. Hearing back from our patients is one way we can continue to improve our services. Please take a few minutes to complete the written survey that you may receive in the mail after your visit with us. Thank you!             Your Updated Medication List - Protect others around you: Learn how to safely use, store and throw away your medicines at www.disposemymeds.org.          This list is accurate as of: 5/17/17  2:43 PM.  Always use your most recent med list.                   Brand Name Dispense Instructions for use    amiodarone HCl 400 MG Tabs     30 tablet    Amiodarone 400 mg PO BID for 4 days, then decrease to 400 mg PO daily for 4 days, and then decrease to 200 mg PO daily.       apixaban ANTICOAGULANT 5 MG tablet    ELIQUIS    60 tablet    Take 1 tablet (5 mg) by mouth 2 times daily       aspirin 81 MG tablet      Take 81 mg by mouth daily       DAILY MULTIVITAMIN PO      Take by mouth daily       lisinopril 2.5 MG tablet    PRINIVIL/Zestril    90 tablet    Take 1 tablet (2.5 mg) by mouth daily       metoprolol 50 MG tablet    LOPRESSOR    90 tablet    Take 0.5 tablets (25 mg) by mouth 2 times daily       OMEGA-3 FISH OIL PO      Take 2 g by mouth daily       VITAMIN C PO      Take by mouth daily

## 2017-05-18 NOTE — PROGRESS NOTES
REASON FOR VISIT:  Evaluation of atrial fibrillation.      HISTORY OF PRESENT ILLNESS:  Mr. Patel is a pleasant 57-year-old gentleman with history of persistent atrial fibrillation who is here for evaluation.      The patient was recently found to have atrial fibrillation on a routine physical (10/2015).  At that time, he showed signs of mild cardiomyopathy with EF around 40-45%.  He was started on amiodarone and Eliquis and underwent cardioversion in 12/2015.  Post-cardioversion, he had recurrence of atrial fibrillation.  Since he was asymptomatic, a decision was made to continue rate control strategy.      I last saw him 05/05/2017.  At that time he was complaining of more symptoms of fatigue and palpitation.  An echocardiogram was repeated and revealed worsening cardiac function (EF around 30-35%).  ZIO Patch monitor was obtained which revealed atrial fibrillation with poor control of ventricular response, having several episodes of rapid ventricular response.      ASSESSMENT AND PLAN:  Persistent atrial fibrillation.  He is now symptomatic and showing signs of cardiomyopathy with deterioration of cardiac function.      During this visit, we discussed options including aggressive rate control, possibly starting digoxin to the medical regimen, or pursue another attempt of rhythm control strategy.  In that setting, I would recommend starting amiodarone and after a month of amiodarone and anticoagulation, I would pursue cardioversion.  We will consider catheter ablation procedure to help wean off amiodarone after cardioversion.      After discussion of the pros and cons of both options, he opted for another attempt of rhythm control.  We will start him on amiodarone.  We will continue metoprolol at current dose.  We will stop aspirin and start Eliquis.  He will follow up here in a month to evaluate EKG and potentially schedule for cardioversion.         NAUN MCGARRY MD             D: 05/17/2017 15:23   T:  2017 14:12   MT: JOSE      Name:     JEREMÍAS ABDULLAHI   MRN:      [id number]        Account:      [id number]   :      1960           Service Date: 2017      Document: [id number]

## 2017-05-22 ENCOUNTER — TELEPHONE (OUTPATIENT)
Dept: CARDIOLOGY | Facility: CLINIC | Age: 57
End: 2017-05-22

## 2017-05-22 DIAGNOSIS — I51.9 LV DYSFUNCTION: ICD-10-CM

## 2017-05-22 DIAGNOSIS — I10 ESSENTIAL HYPERTENSION: ICD-10-CM

## 2017-05-22 DIAGNOSIS — I48.91 ATRIAL FIBRILLATION, UNSPECIFIED TYPE (H): ICD-10-CM

## 2017-05-22 RX ORDER — LISINOPRIL 2.5 MG/1
2.5 TABLET ORAL DAILY
Qty: 90 TABLET | Refills: 3 | Status: SHIPPED | OUTPATIENT
Start: 2017-05-22 | End: 2017-05-24

## 2017-05-22 RX ORDER — AMIODARONE HYDROCHLORIDE 400 MG/1
TABLET ORAL
Qty: 61 TABLET | Refills: 0 | Status: SHIPPED | OUTPATIENT
Start: 2017-05-22 | End: 2017-05-24

## 2017-05-22 RX ORDER — METOPROLOL TARTRATE 50 MG
25 TABLET ORAL 2 TIMES DAILY
Qty: 90 TABLET | Refills: 3 | Status: SHIPPED | OUTPATIENT
Start: 2017-05-22 | End: 2017-05-24

## 2017-05-22 NOTE — TELEPHONE ENCOUNTER
Patient called reporting that he changed insurance and did not realize that he could no longer fill his medications utilizing [facility]; patient is requesting that scripts for metoprolol, lisinopril, and eliquis be sent to [facility] and Amiodarone script be sent to [facility] [address] in [place]. Scripts escripted as requested. Anna Marie

## 2017-05-24 DIAGNOSIS — I48.91 ATRIAL FIBRILLATION, UNSPECIFIED TYPE (H): ICD-10-CM

## 2017-05-24 DIAGNOSIS — I51.9 LV DYSFUNCTION: ICD-10-CM

## 2017-05-24 DIAGNOSIS — I10 ESSENTIAL HYPERTENSION: ICD-10-CM

## 2017-05-24 RX ORDER — AMIODARONE HYDROCHLORIDE 400 MG/1
TABLET ORAL
Qty: 61 TABLET | Refills: 1 | Status: SHIPPED | OUTPATIENT
Start: 2017-05-24 | End: 2017-07-12 | Stop reason: DRUGHIGH

## 2017-05-24 RX ORDER — LISINOPRIL 2.5 MG/1
2.5 TABLET ORAL DAILY
Qty: 90 TABLET | Refills: 3 | Status: SHIPPED | OUTPATIENT
Start: 2017-05-24 | End: 2018-08-16

## 2017-05-24 RX ORDER — METOPROLOL TARTRATE 50 MG
25 TABLET ORAL 2 TIMES DAILY
Qty: 90 TABLET | Refills: 3 | Status: SHIPPED | OUTPATIENT
Start: 2017-05-24 | End: 2017-08-21 | Stop reason: ALTCHOICE

## 2017-06-26 ENCOUNTER — PRE VISIT (OUTPATIENT)
Dept: CARDIOLOGY | Facility: CLINIC | Age: 57
End: 2017-06-26

## 2017-06-28 ENCOUNTER — OFFICE VISIT (OUTPATIENT)
Dept: CARDIOLOGY | Facility: CLINIC | Age: 57
End: 2017-06-28
Attending: INTERNAL MEDICINE
Payer: COMMERCIAL

## 2017-06-28 VITALS
BODY MASS INDEX: 27.76 KG/M2 | SYSTOLIC BLOOD PRESSURE: 97 MMHG | WEIGHT: 228 LBS | DIASTOLIC BLOOD PRESSURE: 72 MMHG | HEART RATE: 67 BPM | HEIGHT: 76 IN

## 2017-06-28 DIAGNOSIS — I48.91 ATRIAL FIBRILLATION, UNSPECIFIED TYPE (H): ICD-10-CM

## 2017-06-28 PROCEDURE — 99214 OFFICE O/P EST MOD 30 MIN: CPT | Mod: 25 | Performed by: NURSE PRACTITIONER

## 2017-06-28 PROCEDURE — 93000 ELECTROCARDIOGRAM COMPLETE: CPT | Performed by: NURSE PRACTITIONER

## 2017-06-28 NOTE — MR AVS SNAPSHOT
After Visit Summary   6/28/2017    John Patel    MRN: [id number]           Patient Information     Date Of Birth          1960        Visit Information        Provider Department      6/28/2017 2:30 PM Damaris Huynh APRN CNP [facility] [place]        Today's Diagnoses     Atrial fibrillation, unspecified type (H)           Follow-ups after your visit        Additional Services     Follow-Up with Cardiac Advanced Practice Provider                 Your next 10 appointments already scheduled     Aug 02, 2017  2:30 PM CDT   [facility] EP RETURN with KHALIDA Fall CNP   [facility] ([facility])    [address]  [place] [address] [phone]              Future tests that were ordered for you today     Open Future Orders        Priority Expected Expires Ordered    Follow-Up with Cardiac Advanced Practice Provider Routine 7/28/2017 6/28/2019 6/28/2017    EKG 12-lead complete w/read - Clinics (future- to be scheduled) Routine 7/28/2017 6/28/2019 6/28/2017            Who to contact     If you have questions or need follow up information about today's clinic visit or your schedule please contact [facility] directly at [phone].  Normal or non-critical lab and imaging results will be communicated to you by MyChart, letter or phone within 4 business days after the clinic has received the results. If you do not hear from us within 7 days, please contact the clinic through MyChart or phone. If you have a critical or abnormal lab result, we will notify you by phone as soon as possible.  Submit refill requests through [employer] or call your pharmacy and they will forward the refill request to us. Please allow 3 business days for your refill to be completed.          Additional Information About Your Visit        MyChart Information  "    [facility] gives you secure access to your electronic health record. If you see a primary care provider, you can also send messages to your care team and make appointments. If you have questions, please call your primary care clinic.  If you do not have a primary care provider, please call [phone] and they will assist you.        Care EveryWhere ID     This is your Care EveryWhere ID. This could be used by other organizations to access your [facility] records  [id number]        Your Vitals Were     Pulse Height BMI (Body Mass Index)             67 1.93 m (6' 4\") 27.75 kg/m2          Blood Pressure from Last 3 Encounters:   06/28/17 97/72   05/17/17 106/75   05/04/17 96/62    Weight from Last 3 Encounters:   06/28/17 103.4 kg (228 lb)   05/17/17 106 kg (233 lb 11.2 oz)   05/04/17 105.6 kg (232 lb 11.2 oz)              We Performed the Following     EKG 12-lead complete w/read - Clinics (to be scheduled)     Follow-Up with Cardiac Advanced Practice Provider          Today's Medication Changes          These changes are accurate as of: 6/28/17  3:10 PM.  If you have any questions, ask your nurse or doctor.               These medicines have changed or have updated prescriptions.        Dose/Directions    amiodarone HCl 400 MG Tabs   This may have changed:    - how much to take  - when to take this  - additional instructions   Used for:  Atrial fibrillation, unspecified type (H)        Amiodarone 400 mg PO BID for 4 days, then decrease to 400 mg PO daily for 4 days, and then decrease to 200 mg PO daily.   Quantity:  61 tablet   Refills:  1       apixaban ANTICOAGULANT 5 MG tablet   Commonly known as:  ELIQUIS   This may have changed:  when to take this   Used for:  Atrial fibrillation, unspecified type (H)        Dose:  5 mg   Take 1 tablet (5 mg) by mouth 2 times daily   Quantity:  180 tablet   Refills:  3                Primary Care Provider Office Phone # Fax #    Marc Mahmood [phone] " [phone]       [facility] [address]  [place] [address]        Equal Access to Services     PATRICIA FRANKEL : Hadii aad ku hadheriberto Soanisa, waaxda luqadaha, qaybta kaalmada timothy, nathalia ware genaan zhou laQuintenkyleigh marroquin. So [facility] [phone].    ATENCIÓN: Si habla español, tiene a perez disposición servicios gratuitos de asistencia lingüística. Llame al [phone].    We comply with applicable federal civil rights laws and [place] laws. We do not discriminate on the basis of race, color, national origin, age, disability sex, sexual orientation or gender identity.            Thank you!     Thank you for choosing [facility] [place]  for your care. Our goal is always to provide you with excellent care. Hearing back from our patients is one way we can continue to improve our services. Please take a few minutes to complete the written survey that you may receive in the mail after your visit with us. Thank you!             Your Updated Medication List - Protect others around you: Learn how to safely use, store and throw away your medicines at www.disposemymeds.org.          This list is accurate as of: 6/28/17  3:10 PM.  Always use your most recent med list.                   Brand Name Dispense Instructions for use Diagnosis    amiodarone HCl 400 MG Tabs     61 tablet    Amiodarone 400 mg PO BID for 4 days, then decrease to 400 mg PO daily for 4 days, and then decrease to 200 mg PO daily.    Atrial fibrillation, unspecified type (H)       apixaban ANTICOAGULANT 5 MG tablet    ELIQUIS    180 tablet    Take 1 tablet (5 mg) by mouth 2 times daily    Atrial fibrillation, unspecified type (H)       DAILY MULTIVITAMIN PO      Take by mouth daily        lisinopril 2.5 MG tablet    PRINIVIL/Zestril    90 tablet    Take 1 tablet (2.5 mg) by mouth daily    LV dysfunction       metoprolol 50 MG tablet    LOPRESSOR    90 tablet    Take 0.5 tablets (25 mg) by mouth  2 times daily    Essential hypertension       OMEGA-3 FISH OIL PO      Take 2 g by mouth daily        VITAMIN C PO      Take by mouth daily

## 2017-06-28 NOTE — PROGRESS NOTES
HISTORY OF PRESENT ILLNESS:  Mr. Patel is a pleasant 57-year-old gentleman with history of persistent atrial fibrillation here for follow up today.      He was found to have atrial fibrillation on a routine physical (10/2015).  At that time, he showed signs of mild cardiomyopathy with EF around 40-45%.  He was started on amiodarone and Eliquis and underwent cardioversion in 12/2015.  Post-cardioversion, he had recurrence of atrial fibrillation.  Since he was asymptomatic, a decision was made to continue rate control strategy and stop the amiodarone.       In May of this year, he was complaining of symptoms of fatigue and palpitations.  An echocardiogram was repeated and revealed worsening cardiac function (EF 30-35%).  ZIO Patch monitor was obtained which revealed atrial fibrillation with poor control of ventricular response with several episodes of rapid ventricular response.       He then saw Dr. Jones in follow up and discussed options including aggressive rate control or pursue another attempt of rhythm control strategy.  A rhythm control strategy was elected and he started amiodarone and Eliquis.    He presents in clinic today to discuss possible cardioversion.  He has only been taking his Eliquis daily.  He feels fatigue but otherwise well.       IMPRESSION AND PLAN:  Not able to DCCV at this time due to insufficient anticoagulation.  Is stable with adequate rate control so will not perform JESSEE.  Plan for re-evaluation and DCCV in 1 month with follow up with Dr. Jones to discuss the long term plan and possible ablation.    Medications Discontinued During This Encounter   Medication Reason     aspirin 81 MG tablet Therapy completed         Encounter Diagnosis   Name Primary?     Atrial fibrillation, unspecified type (H)        CURRENT MEDICATIONS:  Current Outpatient Prescriptions   Medication Sig Dispense Refill     amiodarone HCl 400 MG TABS Amiodarone 400 mg PO BID for 4 days, then decrease to 400 mg PO  daily for 4 days, and then decrease to 200 mg PO daily. (Patient taking differently: 200 mg daily Amiodarone 400 mg PO BID for 4 days, then decrease to 400 mg PO daily for 4 days, and then decrease to 200 mg PO daily.) 61 tablet 1     apixaban ANTICOAGULANT (ELIQUIS) 5 MG tablet Take 1 tablet (5 mg) by mouth 2 times daily (Patient taking differently: Take 5 mg by mouth daily ) 180 tablet 3     metoprolol (LOPRESSOR) 50 MG tablet Take 0.5 tablets (25 mg) by mouth 2 times daily 90 tablet 3     lisinopril (PRINIVIL/ZESTRIL) 2.5 MG tablet Take 1 tablet (2.5 mg) by mouth daily 90 tablet 3     Multiple Vitamins-Minerals (DAILY MULTIVITAMIN PO) Take by mouth daily       Ascorbic Acid (VITAMIN C PO) Take by mouth daily        Omega-3 Fatty Acids (OMEGA-3 FISH OIL PO) Take 2 g by mouth daily          ALLERGIES     Allergies   Allergen Reactions     No Known Drug Allergies        PAST MEDICAL HISTORY:  Past Medical History:   Diagnosis Date     Allergic rhinitis, cause unspecified      Arrhythmia      Atrial fibrillation (H)      Cardiomyopathy (H)      Colon polyp     pre-cancerous and non-cancerous     Hypertension     [facility]     Snores        PAST SURGICAL HISTORY:  Past Surgical History:   Procedure Laterality Date     ANESTHESIA CARDIOVERSION N/A 2015    Procedure: ANESTHESIA CARDIOVERSION;  Surgeon: GENERIC ANESTHESIA PROVIDER;  Location:  OR     COLONOSCOPY N/A 11/10/2015    Procedure: COLONOSCOPY;  Surgeon: Edin Wallace MD, MD;  Location:  GI     HERNIORRHAPHY INGUINAL  2011    Procedure:HERNIORRHAPHY INGUINAL; LEFT INGUINAL HERNIA REPAIR WITH MESH; Surgeon:SYLVESTER KOEHLER; Location:[facility]     HERNIORRHAPHY INGUINAL Right 2016    Procedure: HERNIORRHAPHY INGUINAL;  Surgeon: Sixto Wolf MD;  Location:  OR     ORTHOPEDIC SURGERY  ~    bunionectomy on right       FAMILY HISTORY:  Family History   Problem Relation Age of Onset     DIABETES Father       age 90      "Respiratory Father      COPD-exsmoker     Breast Cancer Mother      DIABETES Mother      Born 1929     DIABETES Brother      Oldest brother has DM     Depression Sister      One sister with depression     Thyroid Disease Sister      Thyroid Disease Brother      Has 4 siblings with thyroid issues     CANCER Brother      Lung cancer, not a smoker, rare type     Depression Brother        SOCIAL HISTORY:  Social History     Social History     Marital status:      Spouse name: Calista Topete     Number of children: 2     Years of education: N/A     Occupational History     Manager of bus garage      [employer]     Social History Main Topics     Smoking status: Never Smoker     Smokeless tobacco: Never Used     Alcohol use 0.0 oz/week     0 Standard drinks or equivalent per week      Comment: 2-3 drink weekly     Drug use: No     Sexual activity: Yes     Partners: Female     Other Topics Concern     Parent/Sibling W/ Cabg, Mi Or Angioplasty Before 65f 55m? No     Caffeine Concern No     3-4 cups of coffee     Sleep Concern Yes     Snores per wife     Stress Concern Yes     Job staffing issues     Special Diet Yes     less sweets     Exercise Yes     Swimming and walking     Seat Belt Yes     Social History Narrative    Swims at [facility], about one mile/35 minutes, 3-4 times weekly.        Review of Systems:  Skin:  Negative       Eyes:  Negative      ENT:  Negative      Respiratory:  Positive for shortness of breath;dyspnea on exertion going up steps    Cardiovascular:    Positive for;palpitations;fatigue;lightheadedness;dizziness    Gastroenterology: Negative      Genitourinary:  Negative      Musculoskeletal:  Negative      Neurologic:  Negative      Psychiatric:  Positive for excessive stress    Heme/Lymph/Imm:  Positive for night sweats    Endocrine:  Negative        Physical Exam:  Vitals: BP 97/72  Pulse 67  Ht 1.93 m (6' 4\")  Wt 103.4 kg (228 lb)  BMI 27.75 kg/m2    Constitutional:  "          Skin:           Head:           Eyes:           ENT:           Neck:           Chest:             Cardiac:                    Abdomen:           Vascular:                                          Extremities and Back:                 Neurological:                 CC  Dimitri Jones MD   [facility]  [address]    [place] [id number]

## 2017-06-28 NOTE — LETTER
6/28/2017    Marc Mahmood MD  [facility]   [address]  [place] [place] [facility] [address]    RE: John Patel       Dear Colleague,    I had the pleasure of seeing John Patel in the [facility].    HISTORY OF PRESENT ILLNESS:  Mr. Patel is a pleasant 57-year-old gentleman with history of persistent atrial fibrillation here for follow up today.      He was found to have atrial fibrillation on a routine physical (10/2015).  At that time, he showed signs of mild cardiomyopathy with EF around 40-45%.  He was started on amiodarone and Eliquis and underwent cardioversion in 12/2015.  Post-cardioversion, he had recurrence of atrial fibrillation.  Since he was asymptomatic, a decision was made to continue rate control strategy and stop the amiodarone.       In May of this year, he was complaining of symptoms of fatigue and palpitations.  An echocardiogram was repeated and revealed worsening cardiac function (EF 30-35%).  ZIO Patch monitor was obtained which revealed atrial fibrillation with poor control of ventricular response with several episodes of rapid ventricular response.       He then saw Dr. Jones in follow up and discussed options including aggressive rate control or pursue another attempt of rhythm control strategy.  A rhythm control strategy was elected and he started amiodarone and Eliquis.    He presents in clinic today to discuss possible cardioversion.  He has only been taking his Eliquis daily.  He feels fatigue but otherwise well.       IMPRESSION AND PLAN:  Not able to DCCV at this time due to insufficient anticoagulation.  Is stable with adequate rate control so will not perform JESSEE.  Plan for re-evaluation and DCCV in 1 month with follow up with Dr. Jones to discuss the long term plan and possible ablation.    Medications Discontinued During This Encounter   Medication Reason     aspirin 81 MG tablet Therapy completed         Encounter  Diagnosis   Name Primary?     Atrial fibrillation, unspecified type (H)        CURRENT MEDICATIONS:  Current Outpatient Prescriptions   Medication Sig Dispense Refill     amiodarone HCl 400 MG TABS Amiodarone 400 mg PO BID for 4 days, then decrease to 400 mg PO daily for 4 days, and then decrease to 200 mg PO daily. (Patient taking differently: 200 mg daily Amiodarone 400 mg PO BID for 4 days, then decrease to 400 mg PO daily for 4 days, and then decrease to 200 mg PO daily.) 61 tablet 1     apixaban ANTICOAGULANT (ELIQUIS) 5 MG tablet Take 1 tablet (5 mg) by mouth 2 times daily (Patient taking differently: Take 5 mg by mouth daily ) 180 tablet 3     metoprolol (LOPRESSOR) 50 MG tablet Take 0.5 tablets (25 mg) by mouth 2 times daily 90 tablet 3     lisinopril (PRINIVIL/ZESTRIL) 2.5 MG tablet Take 1 tablet (2.5 mg) by mouth daily 90 tablet 3     Multiple Vitamins-Minerals (DAILY MULTIVITAMIN PO) Take by mouth daily       Ascorbic Acid (VITAMIN C PO) Take by mouth daily        Omega-3 Fatty Acids (OMEGA-3 FISH OIL PO) Take 2 g by mouth daily          ALLERGIES     Allergies   Allergen Reactions     No Known Drug Allergies        PAST MEDICAL HISTORY:  Past Medical History:   Diagnosis Date     Allergic rhinitis, cause unspecified      Arrhythmia      Atrial fibrillation (H)      Cardiomyopathy (H)      Colon polyp     pre-cancerous and non-cancerous     Hypertension     [facility]     Snores        PAST SURGICAL HISTORY:  Past Surgical History:   Procedure Laterality Date     ANESTHESIA CARDIOVERSION N/A 12/28/2015    Procedure: ANESTHESIA CARDIOVERSION;  Surgeon: GENERIC ANESTHESIA PROVIDER;  Location:  OR     COLONOSCOPY N/A 11/10/2015    Procedure: COLONOSCOPY;  Surgeon: Edin Wallace MD, MD;  Location:  GI     HERNIORRHAPHY INGUINAL  12/27/2011    Procedure:HERNIORRHAPHY INGUINAL; LEFT INGUINAL HERNIA REPAIR WITH MESH; Surgeon:SYLVESTER KOEHLER; Location[facility]     HERNIORRHAPHY INGUINAL Right 8/30/2016     Procedure: HERNIORRHAPHY INGUINAL;  Surgeon: Sixto Wolf MD;  Location: [facility] OR     ORTHOPEDIC SURGERY  ~    bunionectomy on right       FAMILY HISTORY:  Family History   Problem Relation Age of Onset     DIABETES Father       age 90     Respiratory Father      COPD-exsmoker     Breast Cancer Mother      DIABETES Mother      Born 1929     DIABETES Brother      Oldest brother has DM     Depression Sister      One sister with depression     Thyroid Disease Sister      Thyroid Disease Brother      Has 4 siblings with thyroid issues     CANCER Brother      Lung cancer, not a smoker, rare type     Depression Brother        SOCIAL HISTORY:  Social History     Social History     Marital status:      Spouse name: Calista Topete     Number of children: 2     Years of education: N/A     Occupational History     Manager of bus garage      [employer]     Social History Main Topics     Smoking status: Never Smoker     Smokeless tobacco: Never Used     Alcohol use 0.0 oz/week     0 Standard drinks or equivalent per week      Comment: 2-3 drink weekly     Drug use: No     Sexual activity: Yes     Partners: Female     Other Topics Concern     Parent/Sibling W/ Cabg, Mi Or Angioplasty Before 65f 55m? No     Caffeine Concern No     3-4 cups of coffee     Sleep Concern Yes     Snores per wife     Stress Concern Yes     Job staffing issues     Special Diet Yes     less sweets     Exercise Yes     Swimming and walking     Seat Belt Yes     Social History Narrative    Swims at [facility], about one mile/35 minutes, 3-4 times weekly.        Review of Systems:  Skin:  Negative       Eyes:  Negative      ENT:  Negative      Respiratory:  Positive for shortness of breath;dyspnea on exertion going up steps    Cardiovascular:    Positive for;palpitations;fatigue;lightheadedness;dizziness    Gastroenterology: Negative      Genitourinary:  Negative      Musculoskeletal:  Negative      Neurologic:   "Negative      Psychiatric:  Positive for excessive stress    Heme/Lymph/Imm:  Positive for night sweats    Endocrine:  Negative        Physical Exam:  Vitals: BP 97/72  Pulse 67  Ht 1.93 m (6' 4\")  Wt 103.4 kg (228 lb)  BMI 27.75 kg/m2    Constitutional:           Skin:           Head:           Eyes:           ENT:           Neck:           Chest:             Cardiac:                    Abdomen:           Vascular:                                          Extremities and Back:                 Neurological:           Thank you for allowing me to participate in the care of your patient.    Sincerely,     Damaris Huynh, ADIA, APRN CNP     [facility]    "

## 2017-07-12 DIAGNOSIS — I48.91 ATRIAL FIBRILLATION, UNSPECIFIED TYPE (H): ICD-10-CM

## 2017-07-12 RX ORDER — AMIODARONE HYDROCHLORIDE 200 MG/1
TABLET ORAL
Qty: 30 TABLET | Refills: 0 | Status: SHIPPED | OUTPATIENT
Start: 2017-07-12 | End: 2018-08-22

## 2017-08-10 ENCOUNTER — TRANSFERRED RECORDS (OUTPATIENT)
Dept: HEALTH INFORMATION MANAGEMENT | Facility: CLINIC | Age: 57
End: 2017-08-10

## 2017-08-18 ENCOUNTER — TRANSFERRED RECORDS (OUTPATIENT)
Dept: HEALTH INFORMATION MANAGEMENT | Facility: CLINIC | Age: 57
End: 2017-08-18

## 2017-08-21 ENCOUNTER — OFFICE VISIT (OUTPATIENT)
Dept: INTERNAL MEDICINE | Facility: CLINIC | Age: 57
End: 2017-08-21
Payer: COMMERCIAL

## 2017-08-21 VITALS
WEIGHT: 222.9 LBS | HEART RATE: 84 BPM | SYSTOLIC BLOOD PRESSURE: 98 MMHG | TEMPERATURE: 97.6 F | DIASTOLIC BLOOD PRESSURE: 66 MMHG | BODY MASS INDEX: 27.14 KG/M2 | HEIGHT: 76 IN | OXYGEN SATURATION: 99 %

## 2017-08-21 DIAGNOSIS — I48.20 CHRONIC ATRIAL FIBRILLATION (H): Primary | ICD-10-CM

## 2017-08-21 DIAGNOSIS — Z00.00 ROUTINE GENERAL MEDICAL EXAMINATION AT A HEALTH CARE FACILITY: ICD-10-CM

## 2017-08-21 LAB
ALBUMIN UR-MCNC: NEGATIVE MG/DL
APPEARANCE UR: CLEAR
BACTERIA #/AREA URNS HPF: ABNORMAL /HPF
BILIRUB UR QL STRIP: NEGATIVE
COLOR UR AUTO: YELLOW
ERYTHROCYTE [DISTWIDTH] IN BLOOD BY AUTOMATED COUNT: 12.6 % (ref 10–15)
GLUCOSE UR STRIP-MCNC: NEGATIVE MG/DL
HCT VFR BLD AUTO: 45.5 % (ref 40–53)
HGB BLD-MCNC: 15 G/DL (ref 13.3–17.7)
HGB UR QL STRIP: ABNORMAL
KETONES UR STRIP-MCNC: NEGATIVE MG/DL
LEUKOCYTE ESTERASE UR QL STRIP: NEGATIVE
MCH RBC QN AUTO: 31.4 PG (ref 26.5–33)
MCHC RBC AUTO-ENTMCNC: 33 G/DL (ref 31.5–36.5)
MCV RBC AUTO: 95 FL (ref 78–100)
NITRATE UR QL: NEGATIVE
PH UR STRIP: 5.5 PH (ref 5–7)
PLATELET # BLD AUTO: 216 10E9/L (ref 150–450)
RBC # BLD AUTO: 4.78 10E12/L (ref 4.4–5.9)
RBC #/AREA URNS AUTO: ABNORMAL /HPF
SOURCE: ABNORMAL
SP GR UR STRIP: 1.01 (ref 1–1.03)
UROBILINOGEN UR STRIP-ACNC: 0.2 EU/DL (ref 0.2–1)
WBC # BLD AUTO: 7.5 10E9/L (ref 4–11)
WBC #/AREA URNS AUTO: ABNORMAL /HPF

## 2017-08-21 PROCEDURE — 85027 COMPLETE CBC AUTOMATED: CPT | Performed by: INTERNAL MEDICINE

## 2017-08-21 PROCEDURE — 84443 ASSAY THYROID STIM HORMONE: CPT | Performed by: INTERNAL MEDICINE

## 2017-08-21 PROCEDURE — 80061 LIPID PANEL: CPT | Performed by: INTERNAL MEDICINE

## 2017-08-21 PROCEDURE — 99396 PREV VISIT EST AGE 40-64: CPT | Performed by: INTERNAL MEDICINE

## 2017-08-21 PROCEDURE — 36415 COLL VENOUS BLD VENIPUNCTURE: CPT | Performed by: INTERNAL MEDICINE

## 2017-08-21 PROCEDURE — G0103 PSA SCREENING: HCPCS | Performed by: INTERNAL MEDICINE

## 2017-08-21 PROCEDURE — 99213 OFFICE O/P EST LOW 20 MIN: CPT | Mod: 25 | Performed by: INTERNAL MEDICINE

## 2017-08-21 PROCEDURE — 80053 COMPREHEN METABOLIC PANEL: CPT | Performed by: INTERNAL MEDICINE

## 2017-08-21 PROCEDURE — 93000 ELECTROCARDIOGRAM COMPLETE: CPT | Performed by: INTERNAL MEDICINE

## 2017-08-21 PROCEDURE — 81001 URINALYSIS AUTO W/SCOPE: CPT | Performed by: INTERNAL MEDICINE

## 2017-08-21 RX ORDER — METOPROLOL SUCCINATE 25 MG/1
12.5 TABLET, EXTENDED RELEASE ORAL 2 TIMES DAILY
Qty: 90 TABLET | Refills: 1 | Status: SHIPPED | OUTPATIENT
Start: 2017-08-21 | End: 2018-01-09

## 2017-08-21 RX ORDER — FUROSEMIDE 20 MG
20 TABLET ORAL
COMMUNITY
Start: 2017-08-18 | End: 2018-08-22

## 2017-08-21 RX ORDER — PANTOPRAZOLE SODIUM 40 MG/1
40 TABLET, DELAYED RELEASE ORAL
COMMUNITY
Start: 2017-08-18 | End: 2018-08-22

## 2017-08-21 RX ORDER — METOPROLOL SUCCINATE 25 MG/1
12.5 TABLET, EXTENDED RELEASE ORAL 2 TIMES DAILY
COMMUNITY
Start: 2017-08-18 | End: 2017-08-21

## 2017-08-21 NOTE — PROGRESS NOTES
SUBJECTIVE:   CC: John Patel is an 57 year old male who presents for preventative health visit.     Physical   Annual:     Getting at least 3 servings of Calcium per day::  Yes    Bi-annual eye exam::  Yes    Dental care twice a year::  Yes    Sleep apnea or symptoms of sleep apnea::  None    Diet::  Regular (no restrictions)    Frequency of exercise::  2-3 days/week    Duration of exercise::  30-45 minutes    Taking medications regularly::  Yes    Medication side effects::  None    Additional concerns today::  YES (Letter/ Forms for work / Check wounds from surgery)            PROBLEMS TO ADD ON...  Has history of atrial fibrillation. On anticoagulation with Eliquis and rate control medications- Metoprolol. Has been on Amiodarone for 2 months , for rhythm control, but was not effective. Had an ablation done last week. Follows with cardiology. Heart function EF was decreased to 30-35 %, he is on Lisinopril and Lasix. Asymptonatic - no chest pains , palpitations,  no side effects from medications.      Today's PHQ-2 Score: PHQ-2 ( 1999 Pfizer) 8/21/2017   Q1: Little interest or pleasure in doing things 0   Q2: Feeling down, depressed or hopeless 0   PHQ-2 Score 0   Q1: Little interest or pleasure in doing things Not at all   Q2: Feeling down, depressed or hopeless Not at all   PHQ-2 Score 0       Abuse: Current or Past(Physical, Sexual or Emotional)- No  Do you feel safe in your environment - Yes    Social History   Substance Use Topics     Smoking status: Never Smoker     Smokeless tobacco: Never Used     Alcohol use 0.0 oz/week      Comment: 2-3 drink weekly     The patient does not drink >3 drinks per day nor >7 drinks per week.    Last PSA:   PSA   Date Value Ref Range Status   08/18/2016 0.60 0 - 4 ug/L Final     Comment:     Assay Method:  Chemiluminescence using Siemens Vista analyzer       Reviewed orders with patient. Reviewed health maintenance and updated orders accordingly - Yes  Labs reviewed  in EPIC    Reviewed and updated as needed this visit by clinical staff  Tobacco  Allergies  Med Hx  Surg Hx  Fam Hx  Soc Hx        Reviewed and updated as needed this visit by Provider              ROS:  C: NEGATIVE for fever, chills, change in weight  I: NEGATIVE for worrisome rashes, moles or lesions  E: NEGATIVE for vision changes or irritation  ENT: NEGATIVE for ear, mouth and throat problems  R: NEGATIVE for significant cough or SOB  CV: NEGATIVE for chest pain, palpitations or peripheral edema  GI: NEGATIVE for nausea, abdominal pain, heartburn, or change in bowel habits   male: negative for dysuria, hematuria, decreased urinary stream, erectile dysfunction, urethral discharge  M: NEGATIVE for significant arthralgias or myalgia  N: NEGATIVE for weakness, dizziness or paresthesias  P: NEGATIVE for changes in mood or affect    OBJECTIVE:   There were no vitals taken for this visit.    EXAM:  GENERAL: healthy, alert and no distress  EYES: Eyes grossly normal to inspection, PERRL and conjunctivae and sclerae normal  HENT: ear canals and TM's normal, nose and mouth without ulcers or lesions  NECK: no adenopathy, no asymmetry, masses, or scars and thyroid normal to palpation  RESP: lungs clear to auscultation - no rales, rhonchi or wheezes  CV: tachycardia, regular rate and rhythm, normal S1 S2, no S3 or S4, 2/6 systolic murmur, no click or rub, no peripheral edema and peripheral pulses strong  ABDOMEN: soft, nontender, no hepatosplenomegaly, no masses and bowel sounds normal  MS: no gross musculoskeletal defects noted, no edema  SKIN: no suspicious lesions or rashes  NEURO: Normal strength and tone, mentation intact and speech normal  PSYCH: mentation appears normal, affect normal/bright    ASSESSMENT/PLAN:       ICD-10-CM    1. Chronic atrial fibrillation (H) I48.2 metoprolol (TOPROL-XL) 25 MG 24 hr tablet     EKG 12-lead complete w/read - Clinics     CBC with platelets     Comprehensive metabolic panel      "Prostate spec antigen screen     *UA reflex to Microscopic and Culture ([facility] (except [place] and [place])     TSH with free T4 reflex   2. Routine general medical examination at a health care facility Z00.00 Lipid panel reflex to direct LDL     CBC with platelets     Comprehensive metabolic panel     Prostate spec antigen screen     *UA reflex to Microscopic and Culture ([facility] (except [place] and [place])     TSH with free T4 reflex       COUNSELING:   Reviewed preventive health counseling, as reflected in patient instructions       Regular exercise       Healthy diet/nutrition       Vision screening       Hearing screening       Colon cancer screening       Prostate cancer screening    EKG showed atrial flutter, rate 88 , patient is asymptomatic.   Will call to follow up with his cardiologist.   Assess lab work        reports that he has never smoked. He has never used smokeless tobacco.      Estimated body mass index is 27.75 kg/(m^2) as calculated from the following:    Height as of 6/28/17: 6' 4\" (1.93 m).    Weight as of 6/28/17: 228 lb (103.4 kg).         Counseling Resources:  ATP IV Guidelines  Pooled Cohorts Equation Calculator  FRAX Risk Assessment  ICSI Preventive Guidelines  Dietary Guidelines for Americans, 2010  USDA's MyPlate  ASA Prophylaxis  Lung CA Screening    Gerardo Sims MD  [facility]  "

## 2017-08-21 NOTE — MR AVS SNAPSHOT
"              After Visit Summary   8/21/2017    John Patel    MRN: [id number]           Patient Information     Date Of Birth          1960        Visit Information        Provider Department      8/21/2017 9:40 AM Gerardo Sims MD [facility]        Today's Diagnoses     Chronic atrial fibrillation (H)    -  1    Routine general medical examination at a health care facility           Follow-ups after your visit        Who to contact     If you have questions or need follow up information about today's clinic visit or your schedule please contact [facility] directly at [phone].  Normal or non-critical lab and imaging results will be communicated to you by Karmaloophart, letter or phone within 4 business days after the clinic has received the results. If you do not hear from us within 7 days, please contact the clinic through Karmaloophart or phone. If you have a critical or abnormal lab result, we will notify you by phone as soon as possible.  Submit refill requests through BUKA or call your pharmacy and they will forward the refill request to us. Please allow 3 business days for your refill to be completed.          Additional Information About Your Visit        MyChart Information     BUKA gives you secure access to your electronic health record. If you see a primary care provider, you can also send messages to your care team and make appointments. If you have questions, please call your primary care clinic.  If you do not have a primary care provider, please call [phone] and they will assist you.        Care EveryWhere ID     This is your Care EveryWhere ID. This could be used by other organizations to access your [facility] medical records  [id number]        Your Vitals Were     Pulse Temperature Height Pulse Oximetry BMI (Body Mass Index)       84 97.6  F (36.4  C) (Oral) 6' 4\" (1.93 m) 99% 27.13 kg/m2        Blood Pressure from Last 3 Encounters: "   08/21/17 98/66   06/28/17 97/72   05/17/17 106/75    Weight from Last 3 Encounters:   08/21/17 222 lb 14.4 oz (101.1 kg)   06/28/17 228 lb (103.4 kg)   05/17/17 233 lb 11.2 oz (106 kg)              We Performed the Following     *UA reflex to Microscopic and Culture ([facility] (except [place] and [place])     CBC with platelets     Comprehensive metabolic panel     EKG 12-lead complete w/read - Clinics     Lipid panel reflex to direct LDL     Prostate spec antigen screen     TSH with free T4 reflex          Today's Medication Changes          These changes are accurate as of: 8/21/17 10:09 AM.  If you have any questions, ask your nurse or doctor.               These medicines have changed or have updated prescriptions.        Dose/Directions    apixaban ANTICOAGULANT 5 MG tablet   Commonly known as:  ELIQUIS   This may have changed:  when to take this   Used for:  Atrial fibrillation, unspecified type (H)        Dose:  5 mg   Take 1 tablet (5 mg) by mouth 2 times daily   Quantity:  180 tablet   Refills:  3         Stop taking these medicines if you haven't already. Please contact your care team if you have questions.     metoprolol 50 MG tablet   Commonly known as:  LOPRESSOR   Stopped by:  Gerardo Sims MD                Where to get your medicines      These medications were sent to [facility] [address]  [address], [facility] [address]     Phone:  [phone]     metoprolol 25 MG 24 hr tablet                Primary Care Provider Office Phone # Fax #    Marc Mahmood [phone]       [address] [address]        Equal Access to Services     [facility]: Hadii mildred berry Soanisa, waaxda luqadaha, qaybta kaalmada nathalia aguirre. So [facility] [phone].    ATENCIÓN: Si habla español, tiene a perez disposición servicios gratuitos de asistencia  lingüística. Trista al [phone].    We comply with applicable federal civil rights laws and [place] laws. We do not discriminate on the basis of race, color, national origin, age, disability sex, sexual orientation or gender identity.            Thank you!     Thank you for choosing [facility]  for your care. Our goal is always to provide you with excellent care. Hearing back from our patients is one way we can continue to improve our services. Please take a few minutes to complete the written survey that you may receive in the mail after your visit with us. Thank you!             Your Updated Medication List - Protect others around you: Learn how to safely use, store and throw away your medicines at www.disposemymeds.org.          This list is accurate as of: 8/21/17 10:09 AM.  Always use your most recent med list.                   Brand Name Dispense Instructions for use Diagnosis    amiodarone 200 MG tablet    PACERONE/CODARONE    30 tablet    Take 200mg  P.o. daily    Atrial fibrillation, unspecified type (H)       apixaban ANTICOAGULANT 5 MG tablet    ELIQUIS    180 tablet    Take 1 tablet (5 mg) by mouth 2 times daily    Atrial fibrillation, unspecified type (H)       DAILY MULTIVITAMIN PO      Take by mouth daily        furosemide 20 MG tablet    LASIX     Take 20 mg by mouth        lisinopril 2.5 MG tablet    PRINIVIL/Zestril    90 tablet    Take 1 tablet (2.5 mg) by mouth daily    LV dysfunction       metoprolol 25 MG 24 hr tablet    TOPROL-XL    90 tablet    Take 0.5 tablets (12.5 mg) by mouth 2 times daily    Chronic atrial fibrillation (H)       OMEGA-3 FISH OIL PO      Take 2 g by mouth daily        pantoprazole 40 MG EC tablet    PROTONIX     Take 40 mg by mouth        VITAMIN C PO      Take by mouth daily

## 2017-08-21 NOTE — NURSING NOTE
"Chief Complaint   Patient presents with     Physical     Letter Request       Initial BP 98/66 (BP Location: Left arm, Patient Position: Chair, Cuff Size: Adult Large)  Pulse 84  Temp 97.6  F (36.4  C) (Oral)  Ht 6' 4\" (1.93 m)  Wt 222 lb 14.4 oz (101.1 kg)  SpO2 99%  BMI 27.13 kg/m2 Estimated body mass index is 27.13 kg/(m^2) as calculated from the following:    Height as of this encounter: 6' 4\" (1.93 m).    Weight as of this encounter: 222 lb 14.4 oz (101.1 kg).  Medication Reconciliation: complete   Mikala Coley MA    "

## 2017-08-21 NOTE — LETTER
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      If you have any further questions or concerns, please contact our office.      Sincerely ,     Gerardo Sims MD

## 2017-08-22 ENCOUNTER — TRANSFERRED RECORDS (OUTPATIENT)
Dept: HEALTH INFORMATION MANAGEMENT | Facility: CLINIC | Age: 57
End: 2017-08-22

## 2017-08-22 LAB
ALBUMIN SERPL-MCNC: 4.1 G/DL (ref 3.4–5)
ALP SERPL-CCNC: 76 U/L (ref 40–150)
ALT SERPL W P-5'-P-CCNC: 36 U/L (ref 0–70)
ANION GAP SERPL CALCULATED.3IONS-SCNC: 8 MMOL/L (ref 3–14)
AST SERPL W P-5'-P-CCNC: 41 U/L (ref 0–45)
BILIRUB SERPL-MCNC: 1 MG/DL (ref 0.2–1.3)
BUN SERPL-MCNC: 19 MG/DL (ref 7–30)
CALCIUM SERPL-MCNC: 9.3 MG/DL (ref 8.5–10.1)
CHLORIDE SERPL-SCNC: 103 MMOL/L (ref 94–109)
CHOLEST SERPL-MCNC: 170 MG/DL
CO2 SERPL-SCNC: 31 MMOL/L (ref 20–32)
CREAT SERPL-MCNC: 1.31 MG/DL (ref 0.66–1.25)
GFR SERPL CREATININE-BSD FRML MDRD: 57 ML/MIN/1.7M2
GLUCOSE SERPL-MCNC: 95 MG/DL (ref 70–99)
HDLC SERPL-MCNC: 59 MG/DL
LDLC SERPL CALC-MCNC: 84 MG/DL
NONHDLC SERPL-MCNC: 111 MG/DL
POTASSIUM SERPL-SCNC: 4.2 MMOL/L (ref 3.4–5.3)
PROT SERPL-MCNC: 7.6 G/DL (ref 6.8–8.8)
PSA SERPL-ACNC: 0.86 UG/L (ref 0–4)
SODIUM SERPL-SCNC: 142 MMOL/L (ref 133–144)
TRIGL SERPL-MCNC: 135 MG/DL
TSH SERPL DL<=0.005 MIU/L-ACNC: 1.26 MU/L (ref 0.4–4)

## 2017-09-05 ENCOUNTER — DOCUMENTATION ONLY (OUTPATIENT)
Dept: LAB | Facility: CLINIC | Age: 57
End: 2017-09-05

## 2017-09-05 DIAGNOSIS — R31.29 MICROSCOPIC HEMATURIA: Primary | ICD-10-CM

## 2017-09-05 NOTE — PROGRESS NOTES
Patient has just completed a number of lab tests with Dr Sims on 8/21/2017. Not sure that any further testing is required. Will forward to Dr Sims.

## 2017-09-20 DIAGNOSIS — R31.29 MICROSCOPIC HEMATURIA: ICD-10-CM

## 2017-09-20 LAB
ALBUMIN UR-MCNC: NEGATIVE MG/DL
APPEARANCE UR: CLEAR
BILIRUB UR QL STRIP: NEGATIVE
COLOR UR AUTO: YELLOW
GLUCOSE UR STRIP-MCNC: NEGATIVE MG/DL
HGB UR QL STRIP: NEGATIVE
KETONES UR STRIP-MCNC: 15 MG/DL
LEUKOCYTE ESTERASE UR QL STRIP: NEGATIVE
NITRATE UR QL: NEGATIVE
PH UR STRIP: 5.5 PH (ref 5–7)
SOURCE: ABNORMAL
SP GR UR STRIP: 1.02 (ref 1–1.03)
UROBILINOGEN UR STRIP-ACNC: 0.2 EU/DL (ref 0.2–1)

## 2017-09-20 PROCEDURE — 81003 URINALYSIS AUTO W/O SCOPE: CPT | Performed by: INTERNAL MEDICINE

## 2017-09-20 PROCEDURE — 36415 COLL VENOUS BLD VENIPUNCTURE: CPT | Performed by: INTERNAL MEDICINE

## 2017-09-20 PROCEDURE — 80048 BASIC METABOLIC PNL TOTAL CA: CPT | Performed by: INTERNAL MEDICINE

## 2017-09-21 LAB
ANION GAP SERPL CALCULATED.3IONS-SCNC: 9 MMOL/L (ref 3–14)
BUN SERPL-MCNC: 22 MG/DL (ref 7–30)
CALCIUM SERPL-MCNC: 8.9 MG/DL (ref 8.5–10.1)
CHLORIDE SERPL-SCNC: 106 MMOL/L (ref 94–109)
CO2 SERPL-SCNC: 27 MMOL/L (ref 20–32)
CREAT SERPL-MCNC: 1.32 MG/DL (ref 0.66–1.25)
GFR SERPL CREATININE-BSD FRML MDRD: 56 ML/MIN/1.7M2
GLUCOSE SERPL-MCNC: 89 MG/DL (ref 70–99)
POTASSIUM SERPL-SCNC: 4.3 MMOL/L (ref 3.4–5.3)
SODIUM SERPL-SCNC: 142 MMOL/L (ref 133–144)

## 2018-01-09 DIAGNOSIS — I48.20 CHRONIC ATRIAL FIBRILLATION (H): ICD-10-CM

## 2018-01-10 RX ORDER — METOPROLOL SUCCINATE 25 MG/1
TABLET, EXTENDED RELEASE ORAL
Qty: 90 TABLET | Refills: 1 | Status: SHIPPED | OUTPATIENT
Start: 2018-01-10 | End: 2018-08-22

## 2018-01-11 NOTE — TELEPHONE ENCOUNTER
"Requested Prescriptions   Pending Prescriptions Disp Refills     metoprolol (TOPROL-XL) 25 MG 24 hr tablet [Pharmacy Med Name: METOPROLO ER TAB SUC 25MG] 90 tablet 1     Sig: TAKE 1/2 TABLET TWICE A DAY    Beta-Blockers Protocol Passed    1/9/2018  9:20 AM       Passed - Blood pressure under 140/90    BP Readings from Last 3 Encounters:   08/21/17 98/66   06/28/17 97/72   05/17/17 106/75                Passed - Patient is age 6 or older       Passed - Recent or future visit with authorizing provider's specialty    Patient had office visit in the last year or has a visit in the next 30 days with authorizing provider.  See \"Patient Info\" tab in inbasket, or \"Choose Columns\" in Meds & Orders section of the refill encounter.                 Prescription approved per [facility] Refill Protocol.    "

## 2018-02-21 ENCOUNTER — TRANSFERRED RECORDS (OUTPATIENT)
Dept: HEALTH INFORMATION MANAGEMENT | Facility: CLINIC | Age: 58
End: 2018-02-21

## 2018-08-06 ENCOUNTER — TELEPHONE (OUTPATIENT)
Dept: INTERNAL MEDICINE | Facility: CLINIC | Age: 58
End: 2018-08-06

## 2018-08-06 DIAGNOSIS — Z13.6 CARDIOVASCULAR SCREENING; LDL GOAL LESS THAN 160: ICD-10-CM

## 2018-08-06 DIAGNOSIS — Z00.00 ROUTINE GENERAL MEDICAL EXAMINATION AT A HEALTH CARE FACILITY: Primary | ICD-10-CM

## 2018-08-06 DIAGNOSIS — Z12.5 SPECIAL SCREENING FOR MALIGNANT NEOPLASM OF PROSTATE: ICD-10-CM

## 2018-08-16 DIAGNOSIS — I48.91 ATRIAL FIBRILLATION, UNSPECIFIED TYPE (H): ICD-10-CM

## 2018-08-16 DIAGNOSIS — Z13.6 CARDIOVASCULAR SCREENING; LDL GOAL LESS THAN 160: ICD-10-CM

## 2018-08-16 DIAGNOSIS — Z12.5 SPECIAL SCREENING FOR MALIGNANT NEOPLASM OF PROSTATE: ICD-10-CM

## 2018-08-16 DIAGNOSIS — I51.9 LV DYSFUNCTION: ICD-10-CM

## 2018-08-16 DIAGNOSIS — Z00.00 ROUTINE GENERAL MEDICAL EXAMINATION AT A HEALTH CARE FACILITY: ICD-10-CM

## 2018-08-16 LAB
ALBUMIN SERPL-MCNC: 4 G/DL (ref 3.4–5)
ALP SERPL-CCNC: 86 U/L (ref 40–150)
ALT SERPL W P-5'-P-CCNC: 44 U/L (ref 0–70)
ANION GAP SERPL CALCULATED.3IONS-SCNC: 4 MMOL/L (ref 3–14)
AST SERPL W P-5'-P-CCNC: 36 U/L (ref 0–45)
BILIRUB SERPL-MCNC: 0.8 MG/DL (ref 0.2–1.3)
BUN SERPL-MCNC: 18 MG/DL (ref 7–30)
CALCIUM SERPL-MCNC: 8.6 MG/DL (ref 8.5–10.1)
CHLORIDE SERPL-SCNC: 105 MMOL/L (ref 94–109)
CHOLEST SERPL-MCNC: 195 MG/DL
CO2 SERPL-SCNC: 31 MMOL/L (ref 20–32)
CREAT SERPL-MCNC: 1.05 MG/DL (ref 0.66–1.25)
ERYTHROCYTE [DISTWIDTH] IN BLOOD BY AUTOMATED COUNT: 12.3 % (ref 10–15)
GFR SERPL CREATININE-BSD FRML MDRD: 73 ML/MIN/1.7M2
GLUCOSE SERPL-MCNC: 96 MG/DL (ref 70–99)
HCT VFR BLD AUTO: 47.7 % (ref 40–53)
HDLC SERPL-MCNC: 55 MG/DL
HGB BLD-MCNC: 15.8 G/DL (ref 13.3–17.7)
LDLC SERPL CALC-MCNC: 121 MG/DL
MCH RBC QN AUTO: 31.2 PG (ref 26.5–33)
MCHC RBC AUTO-ENTMCNC: 33.1 G/DL (ref 31.5–36.5)
MCV RBC AUTO: 94 FL (ref 78–100)
NONHDLC SERPL-MCNC: 140 MG/DL
PLATELET # BLD AUTO: 247 10E9/L (ref 150–450)
POTASSIUM SERPL-SCNC: 4.5 MMOL/L (ref 3.4–5.3)
PROT SERPL-MCNC: 7.3 G/DL (ref 6.8–8.8)
PSA SERPL-ACNC: 0.65 UG/L (ref 0–4)
RBC # BLD AUTO: 5.06 10E12/L (ref 4.4–5.9)
SODIUM SERPL-SCNC: 140 MMOL/L (ref 133–144)
TRIGL SERPL-MCNC: 95 MG/DL
TSH SERPL DL<=0.005 MIU/L-ACNC: 1.27 MU/L (ref 0.4–4)
WBC # BLD AUTO: 5.8 10E9/L (ref 4–11)

## 2018-08-16 PROCEDURE — 84443 ASSAY THYROID STIM HORMONE: CPT | Performed by: INTERNAL MEDICINE

## 2018-08-16 PROCEDURE — 36415 COLL VENOUS BLD VENIPUNCTURE: CPT | Performed by: INTERNAL MEDICINE

## 2018-08-16 PROCEDURE — 80053 COMPREHEN METABOLIC PANEL: CPT | Performed by: INTERNAL MEDICINE

## 2018-08-16 PROCEDURE — 85027 COMPLETE CBC AUTOMATED: CPT | Performed by: INTERNAL MEDICINE

## 2018-08-16 PROCEDURE — 80061 LIPID PANEL: CPT | Performed by: INTERNAL MEDICINE

## 2018-08-16 PROCEDURE — G0103 PSA SCREENING: HCPCS | Performed by: INTERNAL MEDICINE

## 2018-08-16 RX ORDER — LISINOPRIL 2.5 MG/1
2.5 TABLET ORAL DAILY
Qty: 90 TABLET | Refills: 0 | Status: SHIPPED | OUTPATIENT
Start: 2018-08-16 | End: 2018-08-22

## 2018-08-22 ENCOUNTER — OFFICE VISIT (OUTPATIENT)
Dept: INTERNAL MEDICINE | Facility: CLINIC | Age: 58
End: 2018-08-22
Payer: COMMERCIAL

## 2018-08-22 VITALS
OXYGEN SATURATION: 99 % | HEART RATE: 60 BPM | TEMPERATURE: 98 F | BODY MASS INDEX: 27.39 KG/M2 | WEIGHT: 232 LBS | RESPIRATION RATE: 16 BRPM | DIASTOLIC BLOOD PRESSURE: 66 MMHG | SYSTOLIC BLOOD PRESSURE: 110 MMHG | HEIGHT: 77 IN

## 2018-08-22 DIAGNOSIS — I42.8 NONISCHEMIC CARDIOMYOPATHY (H): ICD-10-CM

## 2018-08-22 DIAGNOSIS — Z13.6 CARDIOVASCULAR SCREENING; LDL GOAL LESS THAN 160: ICD-10-CM

## 2018-08-22 DIAGNOSIS — G25.0 ESSENTIAL TREMOR: ICD-10-CM

## 2018-08-22 DIAGNOSIS — Z00.00 ROUTINE GENERAL MEDICAL EXAMINATION AT A HEALTH CARE FACILITY: Primary | ICD-10-CM

## 2018-08-22 DIAGNOSIS — I48.91 ATRIAL FIBRILLATION, UNSPECIFIED TYPE (H): ICD-10-CM

## 2018-08-22 DIAGNOSIS — L57.0 AK (ACTINIC KERATOSIS): ICD-10-CM

## 2018-08-22 PROCEDURE — 99396 PREV VISIT EST AGE 40-64: CPT | Performed by: INTERNAL MEDICINE

## 2018-08-22 RX ORDER — PROPRANOLOL HYDROCHLORIDE 40 MG/1
40 TABLET ORAL 2 TIMES DAILY
Qty: 180 TABLET | Refills: 3 | Status: SHIPPED | OUTPATIENT
Start: 2018-08-22 | End: 2019-01-24

## 2018-08-22 RX ORDER — LISINOPRIL 2.5 MG/1
2.5 TABLET ORAL DAILY
Qty: 90 TABLET | Refills: 1 | Status: SHIPPED | OUTPATIENT
Start: 2018-08-22 | End: 2019-01-27

## 2018-08-22 NOTE — PATIENT INSTRUCTIONS
Preventive Health Recommendations  Male Ages 50 - 64    Yearly exam:             See your health care provider every year in order to  o   Review health changes.   o   Discuss preventive care.    o   Review your medicines if your doctor has prescribed any.     Have a cholesterol test every 5 years, or more frequently if you are at risk for high cholesterol/heart disease.     Have a diabetes test (fasting glucose) every three years. If you are at risk for diabetes, you should have this test more often.     Have a colonoscopy at age 50, or have a yearly FIT test (stool test). These exams will check for colon cancer.      Talk with your health care provider about whether or not a prostate cancer screening test (PSA) is right for you.    You should be tested each year for STDs (sexually transmitted diseases), if you re at risk.     Shots: Get a flu shot each year. Get a tetanus shot every 10 years.     Nutrition:    Eat at least 5 servings of fruits and vegetables daily.     Eat whole-grain bread, whole-wheat pasta and brown rice instead of white grains and rice.     Get adequate Calcium and Vitamin D.     Lifestyle    Exercise for at least 150 minutes a week (30 minutes a day, 5 days a week). This will help you control your weight and prevent disease.     Limit alcohol to one drink per day.     No smoking.     Wear sunscreen to prevent skin cancer.     See your dentist every six months for an exam and cleaning.     See your eye doctor every 1 to 2 years.        Try propranolol 40 mg twice daily to see if it helps lessen the right hand tremors.   We can try going up on the dose if needed--maximum dose would be 120 mg twice daily.     Consider seeing the dermatologist to review sun-induced skin changes and moving rash.     Everything looks fine!    Refills of medications have been faxed to your pharmacy.     I'll get back to you with lab results soon, especially if there is anything of concern.      See you in a year,  sooner if problems.

## 2018-08-22 NOTE — PROGRESS NOTES
"  SUBJECTIVE:   CC: John Patel is an 58 year old male who presents for preventative health visit.     Healthy Habits:  Answers for HPI/ROS submitted by the patient on 8/22/2018   Annual Exam:  Getting at least 3 servings of Calcium per day:: Yes  Bi-annual eye exam:: NO  Dental care twice a year:: Yes  Sleep apnea or symptoms of sleep apnea:: None  Diet:: Regular (no restrictions)  Frequency of exercise:: 4-5 days/week  Taking medications regularly:: Yes  Medication side effects:: None  Additional concerns today:: YES  PHQ-2 Score: 0  Duration of exercise:: 30-45 minutes      Atrial Fibulation   Patient reports he is following up with cardiology for s/p ablation for afib , 8/16/17every Q1 year. Has only had one, 2 day episode of afib since then, otherwise no other signs/sx of afib. Is currently on Lisinopril and \"optional Elloquis\" which he is taking currently.     Lipids  Patient reports he was never diagnosed with hyperlipidemia prior to seeing recent cholesterol lab results. Patient is willing to start on cholesterol medications if recommended. ASCVD risk currently at 5.4%.      Tremor  Patient reports he has a slight right hand tremor. Notes he has a strong FHx of tremors in males. If he is excited or has anxiety he has increased tremor. Tremor has increased since ablation, is exacerbated with caffiene. Reports some of his family members are on beta blocker for control. Is able to control with deep breathing. Decreased with alcohol.     Rash  States he has a \"moving rash\", can sometimes be on his leg, low back or spine. Treated with hydrogen peroxide but did not relieve sx much. Does not bother him, but is noticeable for him.     Feet  Does note he has had bunion surgery on his right foot, needs surgery on L foot for bunion. He is looking to retire in 2 years and would like to put it off until he can be off his foot. Has some cracking between toes on the right foot, has been wrapping toes to help with " "sx. Works well for patient. Develops a \"ridge\" of dry skin on area near pervious R bunion surgery, needs Vaseline to resolve sx.       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 Is still swimming 4-5x a week for 25 min for exercise.     Notes he has some \"sun spots\" on his face, thinks he may need to see a dermatologist.     Patient endorses some increased urinary frequency vs 15 years ago, but is able to completely empty bladder.       Today's PHQ-2 Score:   PHQ-2 ( 1999 Pfizer) 8/22/2018 8/21/2017   Q1: Little interest or pleasure in doing things 0 0   Q2: Feeling down, depressed or hopeless 0 0   PHQ-2 Score 0 0   Q1: Little interest or pleasure in doing things Not at all Not at all   Q2: Feeling down, depressed or hopeless Not at all Not at all   PHQ-2 Score 0 0       Abuse: Current or Past(Physical, Sexual or Emotional)- No  Do you feel safe in your environment - Yes    Social History   Substance Use Topics     Smoking status: Never Smoker     Smokeless tobacco: Never Used     Alcohol use 0.0 oz/week      Comment: 2-3 drink weekly      If you drink alcohol do you typically have >3 drinks per day or >7 drinks per week? No                      Last PSA:   PSA   Date Value Ref Range Status   08/16/2018 0.65 0 - 4 ug/L Final     Comment:     Assay Method:  Chemiluminescence using Siemens Vista analyzer       Reviewed orders with patient. Reviewed health maintenance and updated orders accordingly - Yes  Labs reviewed in EPIC  BP Readings from Last 3 Encounters:   08/22/18 110/66   08/21/17 98/66   06/28/17 97/72    Wt Readings from Last 3 Encounters:   08/22/18 105.2 kg (232 lb)   08/21/17 101.1 kg (222 lb 14.4 oz)   06/28/17 103.4 kg (228 lb)                  Patient Active Problem List   Diagnosis     Allergic rhinitis "     CARDIOVASCULAR SCREENING; LDL GOAL LESS THAN 160     Benign neoplasm of colon     Atrial fibrillation (H)     Nonischemic cardiomyopathy (H)     Past Surgical History:   Procedure Laterality Date     ANESTHESIA CARDIOVERSION N/A 2015    Procedure: ANESTHESIA CARDIOVERSION;  Surgeon: GENERIC ANESTHESIA PROVIDER;  Location: [facility]     CARDIAC SURGERY  17    Ablation     COLONOSCOPY N/A 11/10/2015    No polyps. Sigmoid diverticulosis. Repeat in 5 years. Procedure: COLONOSCOPY;  Surgeon: Edin Wallace MD, MD;  Location:  GI     HERNIORRHAPHY INGUINAL  2011    Procedure:HERNIORRHAPHY INGUINAL; LEFT INGUINAL HERNIA REPAIR WITH MESH; Surgeon:SYLVESTER KOEHLER; Location: [facility]     HERNIORRHAPHY INGUINAL Right 2016    Procedure: HERNIORRHAPHY INGUINAL;  Surgeon: Sixto Wolf MD;  Location: [facility] OR     ORTHOPEDIC SURGERY  ~    bunionectomy on right       Social History   Substance Use Topics     Smoking status: Never Smoker     Smokeless tobacco: Never Used     Alcohol use 0.0 oz/week      Comment: 2-3 drink weekly     Family History   Problem Relation Age of Onset     Diabetes Father       age 90     Respiratory Father      COPD-exsmoker     Breast Cancer Mother      Diabetes Mother      Born 1929     Diabetes Brother      Oldest brother has DM     Depression Brother      Thyroid Disease Brother      Depression Sister      One sister with depression     Thyroid Disease Sister      Thyroid Disease Brother      Has 4 siblings with thyroid issues     Cancer Brother      Lung cancer, not a smoker, rare type     Depression Brother      Diabetes Brother      10 Yrs Older         Current Outpatient Prescriptions   Medication Sig Dispense Refill     apixaban ANTICOAGULANT (ELIQUIS) 5 MG tablet Take 1 tablet (5 mg) by mouth 2 times daily 180 tablet 1     Ascorbic Acid (VITAMIN C PO) Take by mouth daily        ASPIRIN NOT PRESCRIBED (INTENTIONAL) Please choose reason not prescribed, below 1  "each 0     lisinopril (PRINIVIL/ZESTRIL) 2.5 MG tablet Take 1 tablet (2.5 mg) by mouth daily 90 tablet 1     Multiple Vitamins-Minerals (DAILY MULTIVITAMIN PO) Take by mouth daily       Omega-3 Fatty Acids (OMEGA-3 FISH OIL PO) Take 2 g by mouth daily        [DISCONTINUED] lisinopril (PRINIVIL/ZESTRIL) 2.5 MG tablet Take 1 tablet (2.5 mg) by mouth daily 90 tablet 0     Allergies   Allergen Reactions     No Known Drug Allergies        Reviewed and updated as needed this visit by clinical staff  Tobacco  Allergies  Meds  Med Hx  Surg Hx  Fam Hx  Soc Hx        Reviewed and updated as needed this visit by Provider            ROS:    REVIEW OF SYSTEMS: The following systems have been completely reviewed and are negative except as noted in the HPI:   Constitutional, HEENT, respiratory, cardiovascular, gastrointestinal, genitourinary, musculoskeletal, dermatologic, hematologic, endocrine, psychiatric, and neurologic systems.  OBJECTIVE:   /66 (BP Location: Left arm, Patient Position: Sitting, Cuff Size: Adult Large)  Pulse 60  Temp 98  F (36.7  C) (Oral)  Resp 16  Ht 6' 4.5\" (1.943 m)  Wt 232 lb (105.2 kg)  SpO2 99%  BMI 27.87 kg/m2  EXAM:  GENERAL: healthy, alert and no distress  EYES: Eyes grossly normal to inspection, PERRL and conjunctivae and sclerae normal  HENT: ear canals and TM's normal, nose and mouth without ulcers or lesions  NECK: no adenopathy, no asymmetry, masses, or scars and thyroid normal to palpation  RESP: lungs clear to auscultation - no rales, rhonchi or wheezes  CV: regular rate and rhythm, normal S1 S2, no S3 or S4, no murmur, click or rub, no peripheral edema and peripheral pulses strong  ABDOMEN: soft, nontender, no hepatosplenomegaly, no masses and bowel sounds normal   (male): normal male genitalia without lesions or urethral discharge, no hernia  RECTAL: normal sphincter tone, no rectal masses, prostate normal size, smooth, nontender without nodules or masses  MS: no gross " musculoskeletal defects noted, no edema  SKIN: no suspicious lesions or rashes  NEURO: Normal strength and tone, mentation intact and speech normal  PSYCH: mentation appears normal, affect normal/bright    Diagnostic Test Results:  No results found for this or any previous visit (from the past 24 hour(s)).    ASSESSMENT/PLAN:   (Z00.00) Routine general medical examination at a health care facility  (primary encounter diagnosis)  Immunizations, colonoscopy utd. Encouraged regular exercise and balanced diet     (I48.91) Atrial fibrillation, unspecified type (H)  Controlled; followed by cardiology; refilled medications, continue without changes.   Plan: apixaban ANTICOAGULANT (ELIQUIS) 5 MG tablet     (I42.8) Nonischemic cardiomyopathy (H)  Plan: ASPIRIN NOT PRESCRIBED (INTENTIONAL)          (G25.0) Essential tremor  Suspect familial tremor due to strong FHx tremor in males. Reviewed potential treatment options with patient. Will start on propranolol.   Plan: propranolol (INDERAL) 40 MG tablet          (L57.0) AK (actinic keratosis)  Referral to dermatology.   Plan: DERMATOLOGY REFERRAL          (Z13.6) CARDIOVASCULAR SCREENING; LDL GOAL LESS THAN 160    Try propranolol 40 mg twice daily to see if it helps lessen the right hand tremors.   We can try going up on the dose if needed--maximum dose would be 120 mg twice daily.     Consider seeing the dermatologist to review sun-induced skin changes and moving rash.     Everything looks fine!    Refills of medications have been faxed to your pharmacy.     I'll get back to you with lab results soon, especially if there is anything of concern.        COUNSELING:  Reviewed preventive health counseling, as reflected in patient instructions       Regular exercise       Healthy diet/nutrition       Colon cancer screening       Prostate cancer screening    BP Readings from Last 1 Encounters:   08/22/18 110/66     Estimated body mass index is 27.87 kg/(m^2) as calculated from the  "following:    Height as of this encounter: 6' 4.5\" (1.943 m).    Weight as of this encounter: 232 lb (105.2 kg).      Weight management plan: Discussed healthy diet and exercise guidelines and patient will follow up in 12 months in clinic to re-evaluate.     reports that he has never smoked. He has never used smokeless tobacco.      Counseling Resources:  ATP IV Guidelines  Pooled Cohorts Equation Calculator  FRAX Risk Assessment  ICSI Preventive Guidelines  Dietary Guidelines for Americans, 2010  USDA's MyPlate  ASA Prophylaxis  Lung CA Screening    The information in this document, created by the medical scribe for me, accurately reflects the services I personally performed and the decisions made by me. I have reviewed and approved this document for accuracy prior to leaving the patient care area.  Marc Mahmood MD  [facility]  "

## 2018-08-22 NOTE — MR AVS SNAPSHOT
After Visit Summary   8/22/2018    John Patel    MRN: [id number]           Patient Information     Date Of Birth          1960        Visit Information        Provider Department      8/22/2018 9:00 AM Marc Mahmood MD [facility]        Today's Diagnoses     Routine general medical examination at a health care facility    -  1    Atrial fibrillation, unspecified type (H)        Nonischemic cardiomyopathy (H)        Essential tremor        AK (actinic keratosis)        CARDIOVASCULAR SCREENING; LDL GOAL LESS THAN 160          Care Instructions      Preventive Health Recommendations  Male Ages 50 - 64    Yearly exam:             See your health care provider every year in order to  o   Review health changes.   o   Discuss preventive care.    o   Review your medicines if your doctor has prescribed any.     Have a cholesterol test every 5 years, or more frequently if you are at risk for high cholesterol/heart disease.     Have a diabetes test (fasting glucose) every three years. If you are at risk for diabetes, you should have this test more often.     Have a colonoscopy at age 50, or have a yearly FIT test (stool test). These exams will check for colon cancer.      Talk with your health care provider about whether or not a prostate cancer screening test (PSA) is right for you.    You should be tested each year for STDs (sexually transmitted diseases), if you re at risk.     Shots: Get a flu shot each year. Get a tetanus shot every 10 years.     Nutrition:    Eat at least 5 servings of fruits and vegetables daily.     Eat whole-grain bread, whole-wheat pasta and brown rice instead of white grains and rice.     Get adequate Calcium and Vitamin D.     Lifestyle    Exercise for at least 150 minutes a week (30 minutes a day, 5 days a week). This will help you control your weight and prevent disease.     Limit alcohol to one drink per day.     No smoking.     Wear sunscreen to  prevent skin cancer.     See your dentist every six months for an exam and cleaning.     See your eye doctor every 1 to 2 years.        Try propranolol 40 mg twice daily to see if it helps lessen the right hand tremors.   We can try going up on the dose if needed--maximum dose would be 120 mg twice daily.     Consider seeing the dermatologist to review sun-induced skin changes and moving rash.     Everything looks fine!    Refills of medications have been faxed to your pharmacy.     I'll get back to you with lab results soon, especially if there is anything of concern.      See you in a year, sooner if problems.            Follow-ups after your visit        Additional Services     DERMATOLOGY REFERRAL       Your provider has referred you to: [facility]: [facility] [place] [phone]  [facility]: [facility] [place] [phone]   [email or web address]  [place]: [facility] [place] [phone]   [email or web address]  [facility] [phone]   [email or web address]  [place] [phone]   [email or web address]  [facility] [place] [phone]  [email or web address]    Please be aware that coverage of these services is subject to the terms and limitations of your health insurance plan.  Call member services at your health plan with any benefit or coverage questions.      Please bring the following with you to your appointment:    (1) Any X-Rays, CTs or MRIs which have been performed.  Contact the facility where they were done to arrange for  prior to your scheduled appointment.    (2) List of current medications  (3) This referral request   (4) Any documents/labs given to you for this referral                  Who to contact     If you have questions or need follow up information about today's clinic visit or your schedule please contact  "[facility] directly at [phone].  Normal or non-critical lab and imaging results will be communicated to you by MyChart, letter or phone within 4 business days after the clinic has received the results. If you do not hear from us within 7 days, please contact the clinic through [facility] or phone. If you have a critical or abnormal lab result, we will notify you by phone as soon as possible.  Submit refill requests through [facility] or call your pharmacy and they will forward the refill request to us. Please allow 3 business days for your refill to be completed.          Additional Information About Your Visit        [facility] Information     [facility] gives you secure access to your electronic health record. If you see a primary care provider, you can also send messages to your care team and make appointments. If you have questions, please call your primary care clinic.  If you do not have a primary care provider, please call [phone] and they will assist you.        Care EveryWhere ID     This is your Care EveryWhere ID. This could be used by other organizations to access your [place] medical records  [id number]        Your Vitals Were     Pulse Temperature Respirations Height Pulse Oximetry BMI (Body Mass Index)    60 98  F (36.7  C) (Oral) 16 6' 4.5\" (1.943 m) 99% 27.87 kg/m2       Blood Pressure from Last 3 Encounters:   08/22/18 110/66   08/21/17 98/66   06/28/17 97/72    Weight from Last 3 Encounters:   08/22/18 232 lb (105.2 kg)   08/21/17 222 lb 14.4 oz (101.1 kg)   06/28/17 228 lb (103.4 kg)              We Performed the Following     DERMATOLOGY REFERRAL          Today's Medication Changes          These changes are accurate as of 8/22/18 10:06 AM.  If you have any questions, ask your nurse or doctor.               Start taking these medicines.        Dose/Directions    ASPIRIN NOT PRESCRIBED   Commonly known as:  INTENTIONAL   Used for:  Nonischemic cardiomyopathy (H)   Started by:  " Marc Mahmood MD        Please choose reason not prescribed, below   Quantity:  1 each   Refills:  0       propranolol 40 MG tablet   Commonly known as:  INDERAL   Used for:  Essential tremor   Started by:  Marc Mahmood MD        Dose:  40 mg   Take 1 tablet (40 mg) by mouth 2 times daily   Quantity:  180 tablet   Refills:  3            Where to get your medicines      These medications were sent to [facility] [address]  [address], [facility] [address]     Phone:  [phone]     apixaban ANTICOAGULANT 5 MG tablet    lisinopril 2.5 MG tablet    propranolol 40 MG tablet         Some of these will need a paper prescription and others can be bought over the counter.  Ask your nurse if you have questions.     You don't need a prescription for these medications     ASPIRIN NOT PRESCRIBED                Primary Care Provider Office Phone # Fax #    Marc Mahmood [phone]       [address] [place] [address]        Equal Access to Services     [facility] AH: Hadii aad ku hadasho Soomaali, waaxda luqadaha, qaybta kaalmada adeegyada, waxay idiin hayaan gale kharamally negron . So [facility] [phone].    ATENCIÓN: Si habla español, tiene a perez disposición servicios gratuitos de asistencia lingüística. [facility] [phone].    We comply with applicable federal civil rights laws and [place] laws. We do not discriminate on the basis of race, color, national origin, age, disability, sex, sexual orientation, or gender identity.            Thank you!     Thank you for choosing [facility]  for your care. Our goal is always to provide you with excellent care. Hearing back from our patients is one way we can continue to improve our services. Please take a few minutes to complete the written survey that you may receive in the mail after your visit with us. Thank you!             Your Updated Medication List -  Protect others around you: Learn how to safely use, store and throw away your medicines at www.disposemymeds.org.          This list is accurate as of 8/22/18 10:06 AM.  Always use your most recent med list.                   Brand Name Dispense Instructions for use Diagnosis    apixaban ANTICOAGULANT 5 MG tablet    ELIQUIS    180 tablet    Take 1 tablet (5 mg) by mouth 2 times daily    Atrial fibrillation, unspecified type (H)       ASPIRIN NOT PRESCRIBED    INTENTIONAL    1 each    Please choose reason not prescribed, below    Nonischemic cardiomyopathy (H)       DAILY MULTIVITAMIN PO      Take by mouth daily        lisinopril 2.5 MG tablet    PRINIVIL/Zestril    90 tablet    Take 1 tablet (2.5 mg) by mouth daily        OMEGA-3 FISH OIL PO      Take 2 g by mouth daily        propranolol 40 MG tablet    INDERAL    180 tablet    Take 1 tablet (40 mg) by mouth 2 times daily    Essential tremor       VITAMIN C PO      Take by mouth daily

## 2018-08-22 NOTE — LETTER
[facility]  [address] [place] [address]  [phone]    8/22/2018    Regarding:  John Patel  [facility] [facility]   [place] [address]           To whom it may concern:     John Craft Jorge follows with me in our medical clinic. 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     If you have any further questions or problems, please contact our office.    Sincerely,        Marc Mahmood MD

## 2019-01-22 DIAGNOSIS — G25.0 ESSENTIAL TREMOR: ICD-10-CM

## 2019-01-22 DIAGNOSIS — I48.91 ATRIAL FIBRILLATION, UNSPECIFIED TYPE (H): ICD-10-CM

## 2019-01-22 DIAGNOSIS — I42.8 NONISCHEMIC CARDIOMYOPATHY (H): Primary | ICD-10-CM

## 2019-01-22 NOTE — TELEPHONE ENCOUNTER
Reason for Call:  Medication or medication refill:    Do you use a [facility]?  Name of the pharmacy and phone number for the current request:  [facility]    Name of the medication requested: All Rx's    Other request: Pt stated he is switching providers and insurances. Insurance pharmacy asked him to have all current Rx's to be faxed over to: [phone]. Patient is filling out an YASIR but pharmacy needed medications transferred over.     Can we leave a detailed message on this number? YES    Phone number patient can be reached at: Other phone number:  [phone]  Ok to leave a detailed message    Best Time: any     Call taken on 1/22/2019 at 4:21 PM by Chaim Ferrer

## 2019-01-24 ENCOUNTER — TELEPHONE (OUTPATIENT)
Dept: INTERNAL MEDICINE | Facility: CLINIC | Age: 59
End: 2019-01-24

## 2019-01-24 RX ORDER — PROPRANOLOL HYDROCHLORIDE 40 MG/1
40 TABLET ORAL 2 TIMES DAILY
Qty: 180 TABLET | Refills: 2 | Status: SHIPPED | OUTPATIENT
Start: 2019-01-24 | End: 2019-08-06 | Stop reason: DRUGHIGH

## 2019-01-24 NOTE — TELEPHONE ENCOUNTER
Reason for Call:  Other call back    Detailed comments: dolores from [facility] prior authorization department called and wanted to know what med they had gotten the fax for Also need npi # for dr. cooper     Phone Number Patient can be reached at: Dolores [phone]     Best Time: asap    Can we leave a detailed message on this number? NO    Call taken on 1/24/2019 at 3:49 PM by Nickie Maldonado

## 2019-01-24 NOTE — TELEPHONE ENCOUNTER
Spoke with Addison @ [facility].  States a PA is not needed for Propranolol as its on patient's formulary.    Reference # [id number]

## 2019-01-24 NOTE — TELEPHONE ENCOUNTER
"Last office visit 8-22-18    Requested Prescriptions   Pending Prescriptions Disp Refills     lisinopril (PRINIVIL/ZESTRIL) 2.5 MG tablet 90 tablet 1     Sig: Take 1 tablet (2.5 mg) by mouth daily    ACE Inhibitors (Including Combos) Protocol Passed - 1/24/2019  9:12 AM       Passed - Blood pressure under 140/90 in past 12 months    BP Readings from Last 3 Encounters:   08/22/18 110/66   08/21/17 98/66 06/28/17 97/72                Passed - Recent (12 mo) or future (30 days) visit within the authorizing provider's specialty    Patient had office visit in the last 12 months or has a visit in the next 30 days with authorizing provider or within the authorizing provider's specialty.  See \"Patient Info\" tab in inbasket, or \"Choose Columns\" in Meds & Orders section of the refill encounter.             Passed - Medication is active on med list       Passed - Patient is age 18 or older       Passed - Normal serum creatinine on file in past 12 months    Recent Labs   Lab Test 08/16/18  0856   CR 1.05            Passed - Normal serum potassium on file in past 12 months    Recent Labs   Lab Test 08/16/18  0856   POTASSIUM 4.5             propranolol (INDERAL) 40 MG tablet 180 tablet 3     Sig: Take 1 tablet (40 mg) by mouth 2 times daily    Beta-Blockers Protocol Passed - 1/24/2019  9:12 AM       Passed - Blood pressure under 140/90 in past 12 months    BP Readings from Last 3 Encounters:   08/22/18 110/66   08/21/17 98/66 06/28/17 97/72                Passed - Patient is age 6 or older       Passed - Recent (12 mo) or future (30 days) visit within the authorizing provider's specialty    Patient had office visit in the last 12 months or has a visit in the next 30 days with authorizing provider or within the authorizing provider's specialty.  See \"Patient Info\" tab in inbasket, or \"Choose Columns\" in Meds & Orders section of the refill encounter.             Passed - Medication is active on med list        apixaban " ANTICOAGULANT (ELIQUIS) 5 MG tablet 180 tablet 1     Sig: Take 1 tablet (5 mg) by mouth 2 times daily    Direct Oral Anticoagulant Agents Passed - 1/24/2019  9:12 AM       Passed - Normal Platelets on file in past 12 months    Recent Labs   Lab Test 08/16/18  0856                 Passed - Medication is active on med list       Passed - Patient is 18-79 years of age       Passed - Serum creatinine less than or equal to 1.4 on file in past 12 mos    Recent Labs   Lab Test 08/16/18  0856   CR 1.05            Passed - Weight is greater than 60 kg for the past year    Wt Readings from Last 3 Encounters:   08/22/18 105.2 kg (232 lb)   08/21/17 101.1 kg (222 lb 14.4 oz)   06/28/17 103.4 kg (228 lb)            Passed - Recent (6 mo) or future (30 days) visit within the authorizing provider's specialty          Inderal prescription approved per [facility] Refill Protocol.    Routing Eliquis and Lisinopril refill requests to provider for review/approval because:  Per Eliquis protocol, patient needs to be seen every 6 months.  Patient will be due for appointment in February 2019.  When does primary care provider want to see patient?  Need diagnosis for Lisinopril.    Please advise, thanks.

## 2019-01-27 RX ORDER — LISINOPRIL 2.5 MG/1
2.5 TABLET ORAL DAILY
Qty: 90 TABLET | Refills: 1 | Status: SHIPPED | OUTPATIENT
Start: 2019-01-27 | End: 2019-08-06

## 2019-01-29 ENCOUNTER — TRANSFERRED RECORDS (OUTPATIENT)
Dept: HEALTH INFORMATION MANAGEMENT | Facility: CLINIC | Age: 59
End: 2019-01-29

## 2019-08-06 ENCOUNTER — OFFICE VISIT (OUTPATIENT)
Dept: INTERNAL MEDICINE | Facility: CLINIC | Age: 59
End: 2019-08-06
Payer: COMMERCIAL

## 2019-08-06 VITALS
HEIGHT: 76 IN | WEIGHT: 225.1 LBS | DIASTOLIC BLOOD PRESSURE: 68 MMHG | SYSTOLIC BLOOD PRESSURE: 102 MMHG | HEART RATE: 50 BPM | BODY MASS INDEX: 27.41 KG/M2 | RESPIRATION RATE: 16 BRPM | OXYGEN SATURATION: 99 % | TEMPERATURE: 98.6 F

## 2019-08-06 DIAGNOSIS — Z00.00 ENCOUNTER FOR ROUTINE ADULT HEALTH EXAMINATION WITHOUT ABNORMAL FINDINGS: Primary | ICD-10-CM

## 2019-08-06 DIAGNOSIS — Z13.6 CARDIOVASCULAR SCREENING; LDL GOAL LESS THAN 160: ICD-10-CM

## 2019-08-06 DIAGNOSIS — I42.8 NONISCHEMIC CARDIOMYOPATHY (H): ICD-10-CM

## 2019-08-06 DIAGNOSIS — I48.91 ATRIAL FIBRILLATION, UNSPECIFIED TYPE (H): ICD-10-CM

## 2019-08-06 DIAGNOSIS — G25.0 BENIGN ESSENTIAL TREMOR: ICD-10-CM

## 2019-08-06 DIAGNOSIS — I73.00 RAYNAUD'S DISEASE WITHOUT GANGRENE: ICD-10-CM

## 2019-08-06 DIAGNOSIS — Z11.59 SCREENING FOR VIRAL DISEASE: ICD-10-CM

## 2019-08-06 LAB
BASOPHILS # BLD AUTO: 0 10E9/L (ref 0–0.2)
BASOPHILS NFR BLD AUTO: 0.5 %
DIFFERENTIAL METHOD BLD: NORMAL
EOSINOPHIL # BLD AUTO: 0.2 10E9/L (ref 0–0.7)
EOSINOPHIL NFR BLD AUTO: 3.8 %
ERYTHROCYTE [DISTWIDTH] IN BLOOD BY AUTOMATED COUNT: 12.4 % (ref 10–15)
HCT VFR BLD AUTO: 46.2 % (ref 40–53)
HGB BLD-MCNC: 15.1 G/DL (ref 13.3–17.7)
LYMPHOCYTES # BLD AUTO: 1.7 10E9/L (ref 0.8–5.3)
LYMPHOCYTES NFR BLD AUTO: 28.7 %
MCH RBC QN AUTO: 31.1 PG (ref 26.5–33)
MCHC RBC AUTO-ENTMCNC: 32.7 G/DL (ref 31.5–36.5)
MCV RBC AUTO: 95 FL (ref 78–100)
MONOCYTES # BLD AUTO: 0.6 10E9/L (ref 0–1.3)
MONOCYTES NFR BLD AUTO: 10.4 %
NEUTROPHILS # BLD AUTO: 3.3 10E9/L (ref 1.6–8.3)
NEUTROPHILS NFR BLD AUTO: 56.6 %
PLATELET # BLD AUTO: 260 10E9/L (ref 150–450)
RBC # BLD AUTO: 4.86 10E12/L (ref 4.4–5.9)
WBC # BLD AUTO: 5.8 10E9/L (ref 4–11)

## 2019-08-06 PROCEDURE — 80061 LIPID PANEL: CPT | Performed by: INTERNAL MEDICINE

## 2019-08-06 PROCEDURE — 86803 HEPATITIS C AB TEST: CPT | Performed by: INTERNAL MEDICINE

## 2019-08-06 PROCEDURE — 85025 COMPLETE CBC W/AUTO DIFF WBC: CPT | Performed by: INTERNAL MEDICINE

## 2019-08-06 PROCEDURE — 80048 BASIC METABOLIC PNL TOTAL CA: CPT | Performed by: INTERNAL MEDICINE

## 2019-08-06 PROCEDURE — 99396 PREV VISIT EST AGE 40-64: CPT | Performed by: INTERNAL MEDICINE

## 2019-08-06 PROCEDURE — 36415 COLL VENOUS BLD VENIPUNCTURE: CPT | Performed by: INTERNAL MEDICINE

## 2019-08-06 PROCEDURE — 99213 OFFICE O/P EST LOW 20 MIN: CPT | Mod: 25 | Performed by: INTERNAL MEDICINE

## 2019-08-06 RX ORDER — PROPRANOLOL HYDROCHLORIDE 40 MG/1
20 TABLET ORAL 2 TIMES DAILY
Qty: 180 TABLET | Refills: 1 | Status: CANCELLED | OUTPATIENT
Start: 2019-08-06

## 2019-08-06 RX ORDER — PROPRANOLOL HYDROCHLORIDE 20 MG/1
20 TABLET ORAL 2 TIMES DAILY
Qty: 180 TABLET | Refills: 3 | Status: SHIPPED | OUTPATIENT
Start: 2019-08-06 | End: 2020-07-06

## 2019-08-06 RX ORDER — LISINOPRIL 2.5 MG/1
2.5 TABLET ORAL DAILY
Qty: 90 TABLET | Refills: 3 | Status: SHIPPED | OUTPATIENT
Start: 2019-08-06 | End: 2020-07-06

## 2019-08-06 ASSESSMENT — ENCOUNTER SYMPTOMS
HEMATURIA: 0
WEAKNESS: 0
PALPITATIONS: 0
DIARRHEA: 0
FREQUENCY: 0
EYE PAIN: 0
HEMATOCHEZIA: 0
MYALGIAS: 0
DIZZINESS: 0
ARTHRALGIAS: 0
CHILLS: 0
COUGH: 0
NERVOUS/ANXIOUS: 0
FEVER: 0
JOINT SWELLING: 0
PARESTHESIAS: 1
DYSURIA: 0
CONSTIPATION: 0
NAUSEA: 0
SORE THROAT: 0
SHORTNESS OF BREATH: 0
HEARTBURN: 0
ABDOMINAL PAIN: 0
HEADACHES: 0

## 2019-08-06 ASSESSMENT — MIFFLIN-ST. JEOR: SCORE: 1942.55

## 2019-08-06 NOTE — PATIENT INSTRUCTIONS
Shingrix is the new shingles vaccine. Call insurance to find out if covered, whether covered at a pharmacy or doctor's office and the copay.        PREVENTIVE HEALTH RECOMMENDATIONS:     Vaccines: Get a flu shot each year. Get a tetanus shot every 10 years.     Exercise for at least 150 minutes a week (an average of 30 minutes a day, 5 days of the week). This will help you control your weight and prevent disease.    Limit alcohol to one drink per day.    No smoking.     Wear sunscreen to prevent skin cancer.     See your dentist twice a year for an exam and cleaning.    Try to get Calcium 1000 mg total per day. It is best to not take it all at once. Try to get Vitamin D at least 8053-1011 units (25-50 mcg) per day.    BMI or Body Mass Index is a way of indicating weight and health risk for cardiovascular diseases, high blood pressure, diabetes.   Definitions:    Underweight is less than 18.5 and will be associated with health risk.   Normal BMI is 18.5 to 25   Overweight is 25-29   Obesity is 30 or greater   Morbid Obesity is 40 or greater or 35 or greater with diabetes, prediabetes or abnormal blood sugar, high blood pressure or elevated cholesterol  Obesity and Morbid Obesity are associated with higher health risks. Lowering calories, exercising more may lower your BMI and even small decreases can have positive impact on lowering health risks.   Your Body mass index is 27.4 kg/m ..,

## 2019-08-06 NOTE — NURSING NOTE
"Vital signs:  Temp: 98.6  F (37  C) Temp src: Oral BP: 102/68 Pulse: 50   Resp: 16 SpO2: 99 %     Height: 193 cm (6' 4\") Weight: 102.1 kg (225 lb 1.6 oz)  Estimated body mass index is 27.4 kg/m  as calculated from the following:    Height as of this encounter: 1.93 m (6' 4\").    Weight as of this encounter: 102.1 kg (225 lb 1.6 oz).          "

## 2019-08-06 NOTE — PROGRESS NOTES
SUBJECTIVE:   CC: John Patel is an 59 year old male who presents for preventative health visit.     Healthy Habits:     Getting at least 3 servings of Calcium per day:  Yes    Bi-annual eye exam:  NO    Dental care twice a year:  Yes    Sleep apnea or symptoms of sleep apnea:  None    Diet:  Regular (no restrictions)    Frequency of exercise:  2-3 days/week    Duration of exercise:  30-45 minutes    Taking medications regularly:  Yes    Medication side effects:  None    PHQ-2 Total Score: 0    Additional concerns today:  Yes      Current concerns/problems:   1. Afib: doing well after ablation, saw cardiology last month.   2.  Cardiomyopathy: Ejection fraction on recent stress test was 55%, stable on medications  3.  Benign essential tremor: Propranolol is helped but feel well at full dose, so decreased it to 20 mg twice a day which was working well  4.  He has Raynaud's: His cardiologist identified this, it is not severe, does not need treatment for it at this point      Today's PHQ-2 Score:   PHQ-2 ( 1999 Pf[employer]) 8/6/2019   Q1: Little interest or pleasure in doing things 0   Q2: Feeling down, depressed or hopeless 0   PHQ-2 Score 0   Q1: Little interest or pleasure in doing things Not at all   Q2: Feeling down, depressed or hopeless Not at all   PHQ-2 Score 0       Abuse: Current or Past(Physical, Sexual or Emotional)- No  Do you feel safe in your environment? Yes    Social History     Tobacco Use     Smoking status: Never Smoker     Smokeless tobacco: Never Used   Substance Use Topics     Alcohol use: Yes     Alcohol/week: 0.0 oz     Comment: 2-3 drink weekly     If you drink alcohol do you typically have >3 drinks per day or >7 drinks per week? No    Alcohol Use 8/6/2019   Prescreen: >3 drinks/day or >7 drinks/week? No   Prescreen: >3 drinks/day or >7 drinks/week? -   No flowsheet data found.    Last PSA:   PSA   Date Value Ref Range Status   08/16/2018 0.65 0 - 4 ug/L Final     Comment:     Assay  Method:  Chemiluminescence using Siemens Vista analyzer       Reviewed orders with patient. Reviewed health maintenance and updated orders accordingly - Yes      Reviewed and updated as needed this visit by clinical staff         Reviewed and updated as needed this visit by Provider        Patient Active Problem List   Diagnosis     Allergic rhinitis     CARDIOVASCULAR SCREENING; LDL GOAL LESS THAN 160     Benign neoplasm of colon     Atrial fibrillation (H)     Nonischemic cardiomyopathy (H)     Current Outpatient Medications   Medication Sig Dispense Refill     apixaban ANTICOAGULANT (ELIQUIS) 5 MG tablet Take 1 tablet (5 mg) by mouth 2 times daily 180 tablet 1     Ascorbic Acid (VITAMIN C PO) Take by mouth daily        ASPIRIN NOT PRESCRIBED (INTENTIONAL) Please choose reason not prescribed, below 1 each 0     lisinopril (PRINIVIL/ZESTRIL) 2.5 MG tablet Take 1 tablet (2.5 mg) by mouth daily 90 tablet 1     Multiple Vitamins-Minerals (DAILY MULTIVITAMIN PO) Take by mouth daily       Omega-3 Fatty Acids (OMEGA-3 FISH OIL PO) Take 2 g by mouth 2 times daily (with meals)        propranolol (INDERAL) 40 MG tablet Take 1 tablet (40 mg) by mouth 2 times daily (Patient taking differently: Take 20 mg by mouth daily Patient takes 0.5 tablet twice daily) 180 tablet 2        Review of Systems   Constitutional: Negative for chills and fever.   HENT: Negative for congestion, ear pain, hearing loss and sore throat.    Eyes: Negative for pain and visual disturbance.   Respiratory: Negative for cough and shortness of breath.    Cardiovascular: Negative for chest pain, palpitations and peripheral edema.   Gastrointestinal: Negative for abdominal pain, constipation, diarrhea, heartburn, hematochezia and nausea.   Genitourinary: Negative for discharge, dysuria, frequency, genital sores, hematuria, impotence and urgency.   Musculoskeletal: Negative for arthralgias, joint swelling and myalgias.   Skin: Positive for rash.  "  Neurological: Positive for paresthesias. Negative for dizziness, weakness and headaches.   Psychiatric/Behavioral: Negative for mood changes. The patient is not nervous/anxious.      The parasthesias is actually Raynaud's.       OBJECTIVE:   /68   Pulse 50   Temp 98.6  F (37  C) (Oral)   Resp 16   Ht 1.93 m (6' 4\")   Wt 102.1 kg (225 lb 1.6 oz)   SpO2 99%   BMI 27.40 kg/m      Physical Exam    HEENT: extraocular movements are intact, pupils equal and reactive to light and accommodation, TMs clear, oropharynx clear  NECK: Neck supple. No adenopathy. Thyroid symmetric, normal size,, Carotids without bruits.  PULMONARY: clear to auscultation  CARDIAC: S1, S2 normal, no murmur, rub or gallop, regular rate and rhythm  PULSES: 2/2 throughout  ABDOMINAL: Soft, nontender.  Normal bowel sounds.  No hepatosplenomegaly or abnormal masses  BREAST: male breasts are normal without masses  RECTAL: Normal sphincter tone, no masses, prostate mildly , without nodules  REFLEXES: 2+ throughout  SKIN unremarkable         ASSESSMENT/PLAN:   1. 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 Nonischemic cardiomyopathy (H)  Stable, continue med  - lisinopril (PRINIVIL/ZESTRIL) 2.5 MG tablet; Take 1 tablet (2.5 mg) by mouth daily  Dispense: 90 tablet; Refill: 3  - Basic metabolic panel  (Ca, Cl, CO2, Creat, Gluc, K, Na, BUN)  - CBC with platelets and differential    3. Atrial fibrillation, unspecified type (H)  Stable, per cardiology  - apixaban ANTICOAGULANT (ELIQUIS) 5 MG tablet; Take 1 tablet (5 mg) by mouth 2 times daily  Dispense: 180 tablet; Refill: 1    4. Benign essential tremor  Doing well on the lower dose, continue  - propranolol (INDERAL) 20 MG tablet; Take 1 tablet (20 mg) by mouth 2 times daily  Dispense: 180 tablet; Refill: 3    5. Raynaud's disease without gangrene  Fairly " "mild,    6. CARDIOVASCULAR SCREENING; LDL GOAL LESS THAN 160    - Lipid panel reflex to direct LDL Fasting    7. Screening for viral disease    - Hepatitis C Screen Reflex to HCV RNA Quant and Genotype    COUNSELING:   Reviewed preventive health counseling, as reflected in patient instructions    Estimated body mass index is 27.87 kg/m  as calculated from the following:    Height as of 8/22/18: 1.943 m (6' 4.5\").    Weight as of 8/22/18: 105.2 kg (232 lb).     Weight management plan: Discussed healthy diet and exercise guidelines     reports that he has never smoked. He has never used smokeless tobacco.      Counseling Resources:  ATP IV Guidelines  Pooled Cohorts Equation Calculator  FRAX Risk Assessment  ICSI Preventive Guidelines  Dietary Guidelines for Americans, 2010  USDA's MyPlate  ASA Prophylaxis  Lung CA Screening    Pat Johnston MD  [facility]  "

## 2019-08-06 NOTE — LETTER
[facility]  [address]  [facility] [address]  [phone]    8/6/2019        Regarding:  Jhon Venancio Sonyanna  [facility] [facility]   [place] [address]              To whom it may concern:      John Patel follows in our medical clinic. 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      If you have any further questions or problems, please contact our office.     Sincerely,      Pat Johnston MD  [facility]

## 2019-08-07 LAB
ANION GAP SERPL CALCULATED.3IONS-SCNC: 3 MMOL/L (ref 3–14)
BUN SERPL-MCNC: 15 MG/DL (ref 7–30)
CALCIUM SERPL-MCNC: 9 MG/DL (ref 8.5–10.1)
CHLORIDE SERPL-SCNC: 109 MMOL/L (ref 94–109)
CHOLEST SERPL-MCNC: 170 MG/DL
CO2 SERPL-SCNC: 31 MMOL/L (ref 20–32)
CREAT SERPL-MCNC: 1.01 MG/DL (ref 0.66–1.25)
GFR SERPL CREATININE-BSD FRML MDRD: 81 ML/MIN/{1.73_M2}
GLUCOSE SERPL-MCNC: 103 MG/DL (ref 70–99)
HCV AB SERPL QL IA: NONREACTIVE
HDLC SERPL-MCNC: 53 MG/DL
LDLC SERPL CALC-MCNC: 94 MG/DL
NONHDLC SERPL-MCNC: 117 MG/DL
POTASSIUM SERPL-SCNC: 5 MMOL/L (ref 3.4–5.3)
SODIUM SERPL-SCNC: 143 MMOL/L (ref 133–144)
TRIGL SERPL-MCNC: 115 MG/DL

## 2019-10-04 ENCOUNTER — HEALTH MAINTENANCE LETTER (OUTPATIENT)
Age: 59
End: 2019-10-04

## 2019-10-07 ENCOUNTER — ANCILLARY PROCEDURE (OUTPATIENT)
Dept: GENERAL RADIOLOGY | Facility: CLINIC | Age: 59
End: 2019-10-07
Attending: NURSE PRACTITIONER
Payer: COMMERCIAL

## 2019-10-07 ENCOUNTER — OFFICE VISIT (OUTPATIENT)
Dept: INTERNAL MEDICINE | Facility: CLINIC | Age: 59
End: 2019-10-07
Payer: COMMERCIAL

## 2019-10-07 VITALS
BODY MASS INDEX: 28.18 KG/M2 | RESPIRATION RATE: 16 BRPM | DIASTOLIC BLOOD PRESSURE: 64 MMHG | OXYGEN SATURATION: 100 % | TEMPERATURE: 98.2 F | WEIGHT: 231.4 LBS | HEIGHT: 76 IN | HEART RATE: 55 BPM | SYSTOLIC BLOOD PRESSURE: 94 MMHG

## 2019-10-07 DIAGNOSIS — M79.605 PAIN OF LEFT LOWER EXTREMITY: ICD-10-CM

## 2019-10-07 DIAGNOSIS — Z23 NEED FOR PROPHYLACTIC VACCINATION AND INOCULATION AGAINST INFLUENZA: ICD-10-CM

## 2019-10-07 DIAGNOSIS — M79.605 PAIN OF LEFT LOWER EXTREMITY: Primary | ICD-10-CM

## 2019-10-07 PROCEDURE — 72170 X-RAY EXAM OF PELVIS: CPT

## 2019-10-07 PROCEDURE — 90682 RIV4 VACC RECOMBINANT DNA IM: CPT | Performed by: NURSE PRACTITIONER

## 2019-10-07 PROCEDURE — 99213 OFFICE O/P EST LOW 20 MIN: CPT | Mod: 25 | Performed by: NURSE PRACTITIONER

## 2019-10-07 PROCEDURE — 90471 IMMUNIZATION ADMIN: CPT | Performed by: NURSE PRACTITIONER

## 2019-10-07 ASSESSMENT — MIFFLIN-ST. JEOR: SCORE: 1971.12

## 2019-10-07 NOTE — PROGRESS NOTES
Subjective     John Patel is a 59 year old male who presents to clinic today for the following health issues:    HPI   Musculoskeletal problem/pain      Duration: 1 week    Description  Location: L thigh aching pain    Intensity:  mild, moderate    Accompanying signs and symptoms: none    History  Previous similar problem: no   Previous evaluation:  none    Precipitating or alleviating factors:  Trauma or overuse: YES- started after running with dog, R thigh has improved.  Aggravating factors include: none    Therapies tried and outcome: ice and acetaminophen        Patient Active Problem List   Diagnosis     Allergic rhinitis     CARDIOVASCULAR SCREENING; LDL GOAL LESS THAN 160     Benign neoplasm of colon     Atrial fibrillation (H)     Nonischemic cardiomyopathy (H)     Raynaud's disease without gangrene     Past Surgical History:   Procedure Laterality Date     ANESTHESIA CARDIOVERSION N/A 2015    Procedure: ANESTHESIA CARDIOVERSION;  Surgeon: GENERIC ANESTHESIA PROVIDER;  Location: [facility] OR     CARDIAC SURGERY  17    Ablation     COLONOSCOPY N/A 11/10/2015    No polyps. Sigmoid diverticulosis. Repeat in 5 years. Procedure: COLONOSCOPY;  Surgeon: Edin Wallace MD, MD;  Location:  GI     HERNIORRHAPHY INGUINAL  2011    Procedure:HERNIORRHAPHY INGUINAL; LEFT INGUINAL HERNIA REPAIR WITH MESH; Surgeon:SYLVESTER KOEHLER; Location: [facility]     HERNIORRHAPHY INGUINAL Right 2016    Procedure: HERNIORRHAPHY INGUINAL;  Surgeon: Sixto Wolf MD;  Location: [facility] OR     ORTHOPEDIC SURGERY  ~    bunionectomy on right       Social History     Tobacco Use     Smoking status: Never Smoker     Smokeless tobacco: Never Used   Substance Use Topics     Alcohol use: Yes     Alcohol/week: 0.0 standard drinks     Comment: 2-3 drink weekly     Family History   Problem Relation Age of Onset     Diabetes Father          age 90     Respiratory Father         COPD-exsmoker     Breast Cancer Mother  "     Diabetes Mother         Born 1929     Diabetes Brother         Oldest brother has DM     Depression Brother      Thyroid Disease Brother      Depression Sister         One sister with depression     Thyroid Disease Sister      Thyroid Disease Brother         Has 4 siblings with thyroid issues     Cancer Brother         Lung cancer, not a smoker, rare type     Depression Brother      Diabetes Brother         10 Yrs Older         Current Outpatient Medications   Medication Sig Dispense Refill     apixaban ANTICOAGULANT (ELIQUIS) 5 MG tablet Take 1 tablet (5 mg) by mouth 2 times daily 180 tablet 1     Ascorbic Acid (VITAMIN C PO) Take by mouth daily        ASPIRIN NOT PRESCRIBED (INTENTIONAL) Please choose reason not prescribed, below 1 each 0     lisinopril (PRINIVIL/ZESTRIL) 2.5 MG tablet Take 1 tablet (2.5 mg) by mouth daily 90 tablet 3     Multiple Vitamins-Minerals (DAILY MULTIVITAMIN PO) Take by mouth daily       Omega-3 Fatty Acids (OMEGA-3 FISH OIL PO) Take 2 g by mouth 2 times daily (with meals)        propranolol (INDERAL) 20 MG tablet Take 1 tablet (20 mg) by mouth 2 times daily 180 tablet 3     BP Readings from Last 3 Encounters:   10/07/19 94/64   08/06/19 102/68   08/22/18 110/66    Wt Readings from Last 3 Encounters:   10/07/19 105 kg (231 lb 6.4 oz)   08/06/19 102.1 kg (225 lb 1.6 oz)   08/22/18 105.2 kg (232 lb)                      Reviewed and updated as needed this visit by Provider         Review of Systems   ROS COMP: Constitutional, HEENT, cardiovascular, pulmonary, gi and gu systems are negative, except as otherwise noted.      Objective    BP 94/64 (BP Location: Right arm, Patient Position: Sitting, Cuff Size: Adult Large)   Pulse 55   Temp 98.2  F (36.8  C) (Oral)   Resp 16   Ht 1.93 m (6' 4\")   Wt 105 kg (231 lb 6.4 oz)   SpO2 100%   BMI 28.17 kg/m    Body mass index is 28.17 kg/m .  Physical Exam   GENERAL: healthy, alert and no distress  MS: extremities normal- no gross " "deformities noted.  L anteriolateral thigh multiple lipomas, no points of tenderness, full ROM L hip          Assessment & Plan       ICD-10-CM    1. Pain of left lower extremity M79.605 XR Pelvis 1/2 Views        BMI:   Estimated body mass index is 28.17 kg/m  as calculated from the following:    Height as of this encounter: 1.93 m (6' 4\").    Weight as of this encounter: 105 kg (231 lb 6.4 oz).           Patient Instructions   4% lidocaine patches, Salonpas    Sandy Wheeler CNP        Xray pending, NSAIDs prn    Sandy Wheeler, NP  [facility]        "

## 2019-10-07 NOTE — NURSING NOTE
"patient here for left leg pain, that is radiating to the back.  BP 94/64 (BP Location: Right arm, Patient Position: Sitting, Cuff Size: Adult Large)   Pulse 55   Temp 98.2  F (36.8  C) (Oral)   Resp 16   Ht 1.93 m (6' 4\")   Wt 105 kg (231 lb 6.4 oz)   SpO2 100%   BMI 28.17 kg/m      "

## 2019-10-08 ENCOUNTER — HOSPITAL ENCOUNTER (OUTPATIENT)
Dept: MRI IMAGING | Facility: CLINIC | Age: 59
Discharge: HOME | End: 2019-10-08
Attending: NURSE PRACTITIONER | Admitting: NURSE PRACTITIONER
Payer: COMMERCIAL

## 2019-10-08 ENCOUNTER — TELEPHONE (OUTPATIENT)
Dept: INTERNAL MEDICINE | Facility: CLINIC | Age: 59
End: 2019-10-08

## 2019-10-08 DIAGNOSIS — M54.16 LUMBAR RADICULOPATHY: ICD-10-CM

## 2019-10-08 DIAGNOSIS — M79.605 PAIN OF LEFT LOWER EXTREMITY: ICD-10-CM

## 2019-10-08 DIAGNOSIS — M79.605 PAIN OF LEFT LOWER EXTREMITY: Primary | ICD-10-CM

## 2019-10-08 PROCEDURE — 72148 MRI LUMBAR SPINE W/O DYE: CPT

## 2019-10-08 RX ORDER — HYDROCODONE BITARTRATE AND ACETAMINOPHEN 5; 325 MG/1; MG/1
1 TABLET ORAL EVERY 6 HOURS PRN
Qty: 18 TABLET | Refills: 0 | Status: SHIPPED | OUTPATIENT
Start: 2019-10-08 | End: 2019-10-11

## 2019-10-08 NOTE — TELEPHONE ENCOUNTER
Please advise pt xrays pelvis showed mild R>L anatomical sloping and possible impingement of hips, nothing that would cause the type of pain he is experiencing.  Ordered lumbar MRI and sent eRx norco for pain medication.  Sandy Wheeler CNP

## 2019-10-08 NOTE — TELEPHONE ENCOUNTER
Patient calls stating that his pain has increased.  He is asking for the the Rx results and also stated he was laying on his back when he was in yesterday but when he took a shower this morning the pain in his leg was not bearable.  When patient laid down the pain improved.  Please call patient to address.

## 2019-10-08 NOTE — TELEPHONE ENCOUNTER
Patient advised of x-ray results. Informed him Sandy has ordered MRI of lumbar spine and small amount of Norco for pain. Radiology scheduling number provided to patient if he does not hear from Radiology to schedule the MRI appointment.     Patient advised to go to ER if pain becomes unbearable.

## 2019-10-09 ENCOUNTER — OFFICE VISIT (OUTPATIENT)
Dept: ORTHOPEDICS | Facility: CLINIC | Age: 59
End: 2019-10-09
Payer: COMMERCIAL

## 2019-10-09 ENCOUNTER — THERAPY VISIT (OUTPATIENT)
Dept: PHYSICAL THERAPY | Facility: CLINIC | Age: 59
End: 2019-10-09
Payer: COMMERCIAL

## 2019-10-09 ENCOUNTER — TELEPHONE (OUTPATIENT)
Dept: INTERNAL MEDICINE | Facility: CLINIC | Age: 59
End: 2019-10-09

## 2019-10-09 VITALS
WEIGHT: 230 LBS | DIASTOLIC BLOOD PRESSURE: 78 MMHG | HEIGHT: 76 IN | BODY MASS INDEX: 28.01 KG/M2 | SYSTOLIC BLOOD PRESSURE: 130 MMHG

## 2019-10-09 DIAGNOSIS — M51.16 LUMBAR DISC HERNIATION WITH RADICULOPATHY: Primary | ICD-10-CM

## 2019-10-09 DIAGNOSIS — M54.50 LUMBAR PAIN WITH RADIATION DOWN LEFT LEG: Primary | ICD-10-CM

## 2019-10-09 DIAGNOSIS — M51.16 LUMBAR DISC HERNIATION WITH RADICULOPATHY: ICD-10-CM

## 2019-10-09 DIAGNOSIS — M54.16 LUMBAR RADICULOPATHY: Primary | ICD-10-CM

## 2019-10-09 DIAGNOSIS — M79.605 LUMBAR PAIN WITH RADIATION DOWN LEFT LEG: Primary | ICD-10-CM

## 2019-10-09 PROCEDURE — 97530 THERAPEUTIC ACTIVITIES: CPT | Mod: GP | Performed by: PHYSICAL THERAPIST

## 2019-10-09 PROCEDURE — 99203 OFFICE O/P NEW LOW 30 MIN: CPT | Performed by: FAMILY MEDICINE

## 2019-10-09 PROCEDURE — 97110 THERAPEUTIC EXERCISES: CPT | Mod: GP | Performed by: PHYSICAL THERAPIST

## 2019-10-09 PROCEDURE — 97162 PT EVAL MOD COMPLEX 30 MIN: CPT | Mod: GP | Performed by: PHYSICAL THERAPIST

## 2019-10-09 ASSESSMENT — MIFFLIN-ST. JEOR: SCORE: 1964.77

## 2019-10-09 NOTE — PATIENT INSTRUCTIONS
1. Lumbar disc herniation with radiculopathy      -Patient has low back pain rating down the left leg due to a large herniated disc compressing the nerve root  -Patient will start formal physical therapy and home exercise program.  -A referral was placed for a surgical consultation  -Patient will be held out of work until cleared by his surgeon  -Patient will continue with Vicodin as needed  -Call direct clinic number [[phone]] at any time with questions or concerns.    Albert Yeo MD [facility]  [facility] [place]

## 2019-10-09 NOTE — TELEPHONE ENCOUNTER
Please notify pt that lumbar MRI showed a disc herniation at L3-4 that could be cause of back/leg pain.  Referral to medical spine specialist done for further evaluation and treatment options.  Sandy Wheeler CNP

## 2019-10-09 NOTE — LETTER
10/9/2019         RE: John Patel  [address] [place] [address]        Dear Colleague,    Thank you for referring your patient, John Patel, to the [facility]. Please see a copy of my visit note below.    ASSESSMENT & PLAN  Patient Instructions     1. Lumbar disc herniation with radiculopathy      -Patient has low back pain rating down the left leg due to a large herniated disc compressing the nerve root  -Patient will start formal physical therapy and home exercise program.  -A referral was placed for a surgical consultation  -Patient will be held out of work until cleared by his surgeon  -Patient will continue with Vicodin as needed  -Call direct clinic number [[phone]] at any time with questions or concerns.    Albert Yeo MD [facility]  [facility]          -----    SUBJECTIVE  John Patel is a/an 59 year old male who is seen in consultation at the request of  Sandy Wheeler M.D. for evaluation of left sided low back pain. The patient is seen by themselves.    Onset: 10 day(s) ago. Patient describes injury as pain after running with dog trying to get back home during a storm.   Location of Pain: left low back, front of thigh, shin  Rating of Pain at worst: 8/10  Rating of Pain Currently: 4/10  Worsened by: standing, walking, sitting,  transitioning from sitting to standing  Better with: laying down  Treatments tried: rest/activity avoidance, ice, heat, Tylenol, ibuprofen, other medications: Hydrocodone/Acetaminophen (Vicodin/Norco) and walking stick  Quality: aching, dull  Red flags: Weakness: No, bowel/bladder loss: No, foot drop: No  Associated symptoms: no distal numbness or tingling; denies swelling or warmth  Orthopedic history: No  Relevant surgical history: NO  Social history: social history: works at bus      Past Medical History:   Diagnosis Date     Allergic  rhinitis, cause unspecified      Arrhythmia      Atrial fibrillation (H)      Cardiomyopathy (H)      Colon polyp     pre-cancerous and non-cancerous     Hypertension     [facility]     Snores      Social History     Socioeconomic History     Marital status:      Spouse name: Calista Topete     Number of children: 2     Years of education: Not on file     Highest education level: Not on file   Occupational History     Occupation: Manager of bus garage     Comment: [employer]     Employer: [employer]   Social Needs     Financial resource strain: Not on file     Food insecurity:     Worry: Not on file     Inability: Not on file     Transportation needs:     Medical: Not on file     Non-medical: Not on file   Tobacco Use     Smoking status: Never Smoker     Smokeless tobacco: Never Used   Substance and Sexual Activity     Alcohol use: Yes     Alcohol/week: 0.0 standard drinks     Comment: 2-3 drink weekly     Drug use: No     Sexual activity: Yes     Partners: Female     Birth control/protection: Female Surgical     Comment: Tubes Tied   Lifestyle     Physical activity:     Days per week: Not on file     Minutes per session: Not on file     Stress: Not on file   Relationships     Social connections:     Talks on phone: Not on file     Gets together: Not on file     Attends [employer] service: Not on file     Active member of club or organization: Not on file     Attends meetings of clubs or organizations: Not on file     Relationship status: Not on file     Intimate partner violence:     Fear of current or ex partner: Not on file     Emotionally abused: Not on file     Physically abused: Not on file     Forced sexual activity: Not on file   Other Topics Concern     Parent/sibling w/ CABG, MI or angioplasty before 65F 55M? No      Service Not Asked     Blood Transfusions Not Asked     Caffeine Concern No     Comment: 3-4 cups of coffee     Occupational Exposure Not Asked     Hobby Hazards Not  "Asked     Sleep Concern Yes     Comment: Snores per wife     Stress Concern Yes     Comment: Job staffing issues     Weight Concern Not Asked     Special Diet Yes     Comment: less sweets     Back Care Not Asked     Exercise Yes     Comment: Swimming and walking     Bike Helmet Not Asked     Seat Belt Yes     Self-Exams Not Asked   Social History Narrative    Swims at [facility], about one-half mile/25 minutes, 3-4 times weekly.          Patient's past medical, surgical, social, and family histories were reviewed today and no changes are noted.    REVIEW OF SYSTEMS:  10 point ROS is negative other than symptoms noted above in HPI, Past Medical History or as stated below  Constitutional: NEGATIVE for fever, chills, change in weight  Skin: NEGATIVE for worrisome rashes, moles or lesions  GI/: NEGATIVE for bowel or bladder changes  Neuro: NEGATIVE for weakness, dizziness or paresthesias    OBJECTIVE:  /78 (BP Location: Right arm, Patient Position: Chair, Cuff Size: Adult Regular)   Ht 1.93 m (6' 4\")   Wt 104.3 kg (230 lb)   BMI 28.00 kg/m      General: healthy, alert and in no distress  HEENT: no scleral icterus or conjunctival erythema  Skin: no suspicious lesions or rash. No jaundice.  CV: no pedal edema  Resp: normal respiratory effort without conversational dyspnea   Psych: normal mood and affect  Gait: normal steady gait with appropriate coordination and balance  Neuro: normal light touch sensory exam of the bilateral lower extremities.  DTR's 2+ patella and achilles bilaterally.  MSK:  THORACIC/LUMBAR SPINE  Inspection:    No gross deformity/asymmetry  Palpation:    Tender about the left para lumbar muscles and right para lumbar muscles. Otherwise remainder of landmarks are nontender.  Range of Motion:     Lumbar flexion limited slightly by pain    Lumbar extension limited slightly by pain  Strength:    Full strength throughout hips/quads/hamstrings  Special Tests:    Positive: straight leg raise (left)    " Negative: straight leg raise (right)        Independent visualization of the below image:  Recent Results (from the past 24 hour(s))   MR Lumbar Spine w/o Contrast    Narrative    MRI LUMBAR SPINE WITHOUT CONTRAST   10/8/2019 2:58 PM     HISTORY: Back pain, < six weeks, no red flags, no prior management.  Pain of left lower extremity.    TECHNIQUE: Multiplanar multisequence MRI of the lumbar spine without  contrast.    COMPARISON: None.    FINDINGS: The report is dictated assuming five lumbar-type vertebral  bodies, and radiographic correlation may be necessary. The distal  spinal cord and cauda equina appear normal. The bone marrow appears  normal. The paraspinal soft tissues appear normal. An incidental  hemangioma is seen within the L1 vertebral body.    T12-L1:   Normal disc, facet joints, spinal canal and neural foramina.        L1-L2:   Normal disc, facet joints, spinal canal and neural foramina.       L2-L3:  Loss of T2 signal from the disc. Mild annular disc bulge.  Central canal and neural foramen appear patent.    L3-L4:  Loss of T2 signal from the disc. Broad-based left central disc  protrusion with disc material extending laterally into the left L3-L4  neural foramen. This appears to be causing mild displacement of the  exiting left L3 nerve root. This is best seen on axial image 19 and  sagittal images 10-13.    L4-L5:  Degeneration of the disc. Loss of disc space height. Endplate  degenerative changes. Diffuse annular disc bulge. Central canal is  normal. Mild degenerative change in the facet joints. Mild-moderate  right and mild left foraminal stenosis.    L5-S1: Central canal normal. Mild degenerative change in the facet  joints. Neural foramen are patent.      Impression    IMPRESSION:    1. Broad-based left-sided disc herniation at L3-L4 with disc material  extending into the left L3-L4 neural foramen. This appears to be  affecting the exiting left L3 nerve root.  2. Multilevel degenerative change  as described.    KEN KORTE, MD Albert Yeo MD [facility]      Again, thank you for allowing me to participate in the care of your patient.        Sincerely,        Albert Yeo, MD

## 2019-10-09 NOTE — LETTER
October 9, 2019      John Patel  [address] [place] [address]        To Whom It May Concern:    John Patel  was seen on 10/9/19.  Please excuse him from work until medically cleared by spine surgeon due to injury in his lower back.        Sincerely,        Albert Yeo, MD

## 2019-10-09 NOTE — PROGRESS NOTES
ASSESSMENT & PLAN  Patient Instructions     1. Lumbar disc herniation with radiculopathy      -Patient has low back pain rating down the left leg due to a large herniated disc compressing the nerve root  -Patient will start formal physical therapy and home exercise program.  -A referral was placed for a surgical consultation  -Patient will be held out of work until cleared by his surgeon  -Patient will continue with Vicodin as needed  -Call direct clinic number [[phone]] at any time with questions or concerns.    Albert Yeo MD [facility]  [facility]          -----    SUBJECTIVE  John Patel is a/an 59 year old male who is seen in consultation at the request of  Sandy Wheeler M.D. for evaluation of left sided low back pain. The patient is seen by themselves.    Onset: 10 day(s) ago. Patient describes injury as pain after running with dog trying to get back home during a storm.   Location of Pain: left low back, front of thigh, shin  Rating of Pain at worst: 8/10  Rating of Pain Currently: 4/10  Worsened by: standing, walking, sitting,  transitioning from sitting to standing  Better with: laying down  Treatments tried: rest/activity avoidance, ice, heat, Tylenol, ibuprofen, other medications: Hydrocodone/Acetaminophen (Vicodin/Norco) and walking stick  Quality: aching, dull  Red flags: Weakness: No, bowel/bladder loss: No, foot drop: No  Associated symptoms: no distal numbness or tingling; denies swelling or warmth  Orthopedic history: No  Relevant surgical history: NO  Social history: social history: works at bus      Past Medical History:   Diagnosis Date     Allergic rhinitis, cause unspecified      Arrhythmia      Atrial fibrillation (H)      Cardiomyopathy (H)      Colon polyp     pre-cancerous and non-cancerous     Hypertension     [facility]     Snores      Social History     Socioeconomic History     Marital status:       Spouse name: Calista Topete     Number of children: 2     Years of education: Not on file     Highest education level: Not on file   Occupational History     Occupation: Manager of bus garage     Comment: [employer]     Employer: [email or web address]   Social Needs     Financial resource strain: Not on file     Food insecurity:     Worry: Not on file     Inability: Not on file     Transportation needs:     Medical: Not on file     Non-medical: Not on file   Tobacco Use     Smoking status: Never Smoker     Smokeless tobacco: Never Used   Substance and Sexual Activity     Alcohol use: Yes     Alcohol/week: 0.0 standard drinks     Comment: 2-3 drink weekly     Drug use: No     Sexual activity: Yes     Partners: Female     Birth control/protection: Female Surgical     Comment: Tubes Tied   Lifestyle     Physical activity:     Days per week: Not on file     Minutes per session: Not on file     Stress: Not on file   Relationships     Social connections:     Talks on phone: Not on file     Gets together: Not on file     Attends Amish service: Not on file     Active member of club or organization: Not on file     Attends meetings of clubs or organizations: Not on file     Relationship status: Not on file     Intimate partner violence:     Fear of current or ex partner: Not on file     Emotionally abused: Not on file     Physically abused: Not on file     Forced sexual activity: Not on file   Other Topics Concern     Parent/sibling w/ CABG, MI or angioplasty before 65F 55M? No      Service Not Asked     Blood Transfusions Not Asked     Caffeine Concern No     Comment: 3-4 cups of coffee     Occupational Exposure Not Asked     Hobby Hazards Not Asked     Sleep Concern Yes     Comment: Snores per wife     Stress Concern Yes     Comment: Job staffing issues     Weight Concern Not Asked     Special Diet Yes     Comment: less sweets     Back Care Not Asked     Exercise Yes     Comment: Swimming and walking  "    Bike Helmet Not Asked     Seat Belt Yes     Self-Exams Not Asked   Social History Narrative    Swims at [facility], about one-half mile/25 minutes, 3-4 times weekly.          Patient's past medical, surgical, social, and family histories were reviewed today and no changes are noted.    REVIEW OF SYSTEMS:  10 point ROS is negative other than symptoms noted above in HPI, Past Medical History or as stated below  Constitutional: NEGATIVE for fever, chills, change in weight  Skin: NEGATIVE for worrisome rashes, moles or lesions  GI/: NEGATIVE for bowel or bladder changes  Neuro: NEGATIVE for weakness, dizziness or paresthesias    OBJECTIVE:  /78 (BP Location: Right arm, Patient Position: Chair, Cuff Size: Adult Regular)   Ht 1.93 m (6' 4\")   Wt 104.3 kg (230 lb)   BMI 28.00 kg/m     General: healthy, alert and in no distress  HEENT: no scleral icterus or conjunctival erythema  Skin: no suspicious lesions or rash. No jaundice.  CV: no pedal edema  Resp: normal respiratory effort without conversational dyspnea   Psych: normal mood and affect  Gait: normal steady gait with appropriate coordination and balance  Neuro: normal light touch sensory exam of the bilateral lower extremities.  DTR's 2+ patella and achilles bilaterally.  MSK:  THORACIC/LUMBAR SPINE  Inspection:    No gross deformity/asymmetry  Palpation:    Tender about the left para lumbar muscles and right para lumbar muscles. Otherwise remainder of landmarks are nontender.  Range of Motion:     Lumbar flexion limited slightly by pain    Lumbar extension limited slightly by pain  Strength:    Full strength throughout hips/quads/hamstrings  Special Tests:    Positive: straight leg raise (left)    Negative: straight leg raise (right)        Independent visualization of the below image:  Recent Results (from the past 24 hour(s))   MR Lumbar Spine w/o Contrast    Narrative    MRI LUMBAR SPINE WITHOUT CONTRAST   10/8/2019 2:58 PM     HISTORY: Back pain, < six " weeks, no red flags, no prior management.  Pain of left lower extremity.    TECHNIQUE: Multiplanar multisequence MRI of the lumbar spine without  contrast.    COMPARISON: None.    FINDINGS: The report is dictated assuming five lumbar-type vertebral  bodies, and radiographic correlation may be necessary. The distal  spinal cord and cauda equina appear normal. The bone marrow appears  normal. The paraspinal soft tissues appear normal. An incidental  hemangioma is seen within the L1 vertebral body.    T12-L1:   Normal disc, facet joints, spinal canal and neural foramina.        L1-L2:   Normal disc, facet joints, spinal canal and neural foramina.       L2-L3:  Loss of T2 signal from the disc. Mild annular disc bulge.  Central canal and neural foramen appear patent.    L3-L4:  Loss of T2 signal from the disc. Broad-based left central disc  protrusion with disc material extending laterally into the left L3-L4  neural foramen. This appears to be causing mild displacement of the  exiting left L3 nerve root. This is best seen on axial image 19 and  sagittal images 10-13.    L4-L5:  Degeneration of the disc. Loss of disc space height. Endplate  degenerative changes. Diffuse annular disc bulge. Central canal is  normal. Mild degenerative change in the facet joints. Mild-moderate  right and mild left foraminal stenosis.    L5-S1: Central canal normal. Mild degenerative change in the facet  joints. Neural foramen are patent.      Impression    IMPRESSION:    1. Broad-based left-sided disc herniation at L3-L4 with disc material  extending into the left L3-L4 neural foramen. This appears to be  affecting the exiting left L3 nerve root.  2. Multilevel degenerative change as described.    KEN KORTE, MD Albert Yeo MD [facility]

## 2019-10-10 ENCOUNTER — TELEPHONE (OUTPATIENT)
Dept: ORTHOPEDICS | Facility: CLINIC | Age: 59
End: 2019-10-10

## 2019-10-10 ENCOUNTER — THERAPY VISIT (OUTPATIENT)
Dept: PHYSICAL THERAPY | Facility: CLINIC | Age: 59
End: 2019-10-10
Payer: COMMERCIAL

## 2019-10-10 DIAGNOSIS — M54.16 LUMBAR RADICULOPATHY: ICD-10-CM

## 2019-10-10 DIAGNOSIS — M51.16 LUMBAR DISC HERNIATION WITH RADICULOPATHY: Primary | ICD-10-CM

## 2019-10-10 PROCEDURE — 97110 THERAPEUTIC EXERCISES: CPT | Mod: GP | Performed by: PHYSICAL THERAPIST

## 2019-10-10 PROCEDURE — 97530 THERAPEUTIC ACTIVITIES: CPT | Mod: GP | Performed by: PHYSICAL THERAPIST

## 2019-10-10 NOTE — PROGRESS NOTES
Subjective:  HPI                    Objective:  System    Physical Exam    General     ROS    Assessment/Plan:    DISCHARGE REPORT    Progress reporting period is from 10-9-18 to 10-10-19.       SUBJECTIVE  Subjective changes noted by patient:  .  Subjective: No changes noted from performing positioning.    Current pain level is 7/10  .     Previous pain level was  7/10 Initial Pain level: 7/10.   Changes in function:  Yes (See Goal flowsheet attached for changes in current functional level)  Adverse reaction to treatment or activity: None    OBJECTIVE  Changes noted in objective findings:  The objective findings below are from DOS 10-10-19.  Objective: Does not tolerate any positions other than R sidelying--patient reports his symptoms willl abolish with this position but do not remain better.  Poor tolerance to standing, walking, sitting and continued high pain levels indicating poor prognosis for PT.      ASSESSMENT/PLAN  Updated problem list and treatment plan: Diagnosis 1:  Lumbar radiculopathy  Pain -  self management, education, directional preference exercise and home program  Decreased ROM/flexibility - manual therapy and therapeutic exercise  Decreased strength - therapeutic exercise and therapeutic activities  Decreased function - therapeutic activities  Impaired posture - neuro re-education  STG/LTGs have been met or progress has been made towards goals:  None  Assessment of Progress: The patient's condition is unchanged and has a surgical consult scheduled next week.   Self Management Plans:  Patient is independent in a home treatment program.  Patient is independent in self management of symptoms.  I have re-evaluated this patient and find that the nature, scope, duration and intensity of the therapy is appropriate for the medical condition of the patient.  John continues to require the following intervention to meet STG and LTG's:  PT intervention is no longer required to meet  STG/LTG.    Recommendations:  This patient would benefit from further evaluation.    Please refer to the daily flowsheet for treatment today, total treatment time and time spent performing 1:1 timed codes.

## 2019-10-10 NOTE — TELEPHONE ENCOUNTER
I spoke with patient who has a Neurosurgery consult on 10/15/19.  If they do not follow through with surgery, patient will try an epidural steroid injection.  An order was placed for a lumbar JULIO to be scheduled for after 10/15/19.

## 2019-10-12 ENCOUNTER — NURSE TRIAGE (OUTPATIENT)
Dept: NURSING | Facility: CLINIC | Age: 59
End: 2019-10-12

## 2019-10-12 ENCOUNTER — NURSE TRIAGE (OUTPATIENT)
Dept: PEDIATRICS | Facility: OTHER | Age: 59
End: 2019-10-12

## 2019-10-12 ENCOUNTER — TELEPHONE (OUTPATIENT)
Dept: INTERNAL MEDICINE | Facility: CLINIC | Age: 59
End: 2019-10-12

## 2019-10-12 DIAGNOSIS — M62.838 MUSCLE SPASM: Primary | ICD-10-CM

## 2019-10-12 RX ORDER — CYCLOBENZAPRINE HCL 10 MG
TABLET ORAL
Qty: 15 TABLET | Refills: 0 | Status: SHIPPED | OUTPATIENT
Start: 2019-10-12 | End: 2020-08-07

## 2019-10-12 NOTE — TELEPHONE ENCOUNTER
Dr Sims paged at 11:45 a.m.    John Jorge, 1960, [id number]  Lumbar radiculopathy disc herniation-new dx  Has been told to stop ibuprofen-takes Eliquis  Has Norco. Asking for muscle relaxant.   Pt of Dr Timoteo ALLEN RN -[phone]    Hilton requested specifically that I speak to the on call about this.  Emmy ALLEN RN [facility]

## 2019-10-12 NOTE — TELEPHONE ENCOUNTER
Dr Sims paged at 11:45 a.m.    John Patel, 1960, [id number]  Lumbar radiculopathy disc herniation-new dx  Has been told to stop ibuprofen-takes Eliquis  Has Norco. Asking for muscle relaxant.   Pt of Dr Timoteo ALLEN RN [phone]     Hilton requested specifically that I speak to the on call about this.    Second page to Dr Sims at 11:55 a.m.    2nd page  John Patel, 1960, [id number]  Lumbar radiculopathy disc herniation-new dx  Has been told to stop ibuprofen-takes Eliquis  Has Norco. Asking for muscle relaxant.   Pt of Dr Timoteo ALLEN RN [phone]    12:05 pm  Called page  asking for assistance -  No answer after >10 minutes  Dr Sims called back and okayed Flexaril 10 mg tablets  cyclobenzaprine (FLEXERIL) 10 MG tablet 15 tablet 0 10/12/2019  --   Sig: Take 1/2 to 1 tablet every 8 hours as needed for muscle spasms     Emmy ALLEN RN [facility]

## 2019-10-12 NOTE — TELEPHONE ENCOUNTER
"Patient reports \"I've been in for herniated disc issue this week. I saw my primary on Monday, had a MRI on Tuesday, and saw Dr Yeo on Wednesday. I take Eliquis, a blood thinner, I've been taking Ibuprofen, I've been told that it's not a good idea to take Ibuprofen with Eliquis, I've been icing the herniated disc area. Pain is subsiding as long as I in this position. I'm home on couch. I'm not working or going out much.\"     Patient asking for a muscle relaxant prescription. Advised that an on call provider will not give prescriptions over the phone. Advised to call his primary clinic or Dr Yeo (sports medicine) clinic on Monday. Caller verbalized understanding and had no further questions.     Naomi Yoo RN/[facility]    Reason for Disposition    Caller requesting a NON-URGENT new prescription or refill and triager unable to refill per unit policy    Protocols used: MEDICATION QUESTION CALL-A-AH      "

## 2019-10-15 ENCOUNTER — OFFICE VISIT (OUTPATIENT)
Dept: NEUROSURGERY | Facility: CLINIC | Age: 59
End: 2019-10-15
Attending: NEUROLOGICAL SURGERY
Payer: COMMERCIAL

## 2019-10-15 ENCOUNTER — TELEPHONE (OUTPATIENT)
Dept: PHYSICAL THERAPY | Facility: CLINIC | Age: 59
End: 2019-10-15

## 2019-10-15 VITALS
HEART RATE: 66 BPM | SYSTOLIC BLOOD PRESSURE: 102 MMHG | TEMPERATURE: 97 F | RESPIRATION RATE: 16 BRPM | BODY MASS INDEX: 27.4 KG/M2 | DIASTOLIC BLOOD PRESSURE: 70 MMHG | HEIGHT: 76 IN | WEIGHT: 225 LBS | OXYGEN SATURATION: 90 %

## 2019-10-15 DIAGNOSIS — M54.16 LUMBAR RADICULOPATHY: Primary | ICD-10-CM

## 2019-10-15 PROCEDURE — 99205 OFFICE O/P NEW HI 60 MIN: CPT | Performed by: NEUROLOGICAL SURGERY

## 2019-10-15 PROCEDURE — G0463 HOSPITAL OUTPT CLINIC VISIT: HCPCS

## 2019-10-15 ASSESSMENT — MIFFLIN-ST. JEOR: SCORE: 1942.09

## 2019-10-15 ASSESSMENT — PAIN SCALES - GENERAL: PAINLEVEL: SEVERE PAIN (6)

## 2019-10-15 NOTE — LETTER
[facility]   Neurosurgery Clinic  [address] [place] [address]  [id number]        To Whom it May Concern,      John Patel is being seen at our clinic for follow up care. Please excuse this patient from work for 2 weeks while he undergoes physical therapy, beginning today 10/15.     John will be re-evaluated at his next follow up visit. Please call our clinic with questions or concerns.       Sincerely,          Pablo Velazquez MD

## 2019-10-15 NOTE — LETTER
10/15/2019         RE: John Patel  [address] [place] [address]        Dear Colleague,    Thank you for referring your patient, John Patel, to the [facility]. Please see a copy of my visit note below.    Mr. Patel is a 59-year-old man was seen today for low back and predominant left leg pain.  This began pain began following an episode of walking his dog just over 2 weeks ago.  The dog was frightened by a passing thunderstorm and he ran home with the dog.  He relates twisting his back somewhat.  He also notes that about the same time he had been involved in strenuous activities including lifting bricks.  He noted the onset of pain into his left anterolateral thigh beginning shortly after these episodes and found that this increased somewhat over the next several days.  He denies difficulty with bowel or bladder control.  He relates one prior episode of low back pain which occurred 5 to 6 years ago but was not associated with leg symptoms and resolve spontaneously.  He has not undergone a lumbar epidural steroid injection or other invasive procedure to his lumbar spine.    On examination he appears comfortable, is able to arise from a seated to standing position without difficulty and walks with a nonantalgic gait.  Motor examination of his bilateral lower extremities shows 5/5 strength throughout including plantar and dorsiflexion at the ankles while weightbearing.  There is trace weakness with extension at his left knee using his quadriceps rated at 5-/5.  There is no dermatomal sensory disturbance however.  Straight leg raising sign appears to be negative bilaterally.  Deep tendon reflexes are 1+ and equal at the knees and ankles bilaterally.  There is no ankle clonus noted and toes are downgoing on plantar stimulation.  There is no evidence of pain over the region of the trochanteric bursa.    His recent lumbar MRI scan wer extremity radicular pain.   History is significant for an episode which occurred just over 2 weeks ago when he was walking his dog.  The dog was frightened by the presence of a thunderstorm and he twisted and then ran home with the dog prior to the onset of symptoms into the anterolateral aspect of his left thigh.  He also participated in strenuous activities about that time including lifting bricks.  He noted the symptoms into his left leg which increased over the first few days and have been persistent for the past 2 weeks.  He states that the pain is present over the anterolateral aspect of his left thigh exacerbated by standing or lumbar extension, relieved by lying down or sitting.  He has no complaint of sensory loss.  He notes some weakness of his left leg which he describes as below the knee but is not had difficulty with ambulation.  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  He reports that initially this was quite severe but that with the use of physical therapy he is experienced some improvement over the past 5 to 6 days.  Presently he notes that he is more comfortable while seated or walking short distances.  However, he states that standing or straightening his back tends to exacerbate this problem.  He has no complaint of symptoms into his right leg.    Review of his recent lumbar MRI scan obtained on October 8, 2019 shows multilevel degenerative lumbar disc changes most pronounced at the L3-4 level where he has a left-sided disc herniation with an extruded fragment extending in a cephalad direction and compressing the exiting left L3 nerve root.  The transiting left L4 nerve root also appears to be impinged in the lateral recess at this level.  Spinal alignment is otherwise satisfactory.  There is no other source of neural impingement noted on this study.    Assessment: Left L3-4 disc herniation with impingement of the exiting left L3 nerve root and  descending left L4 nerve root in this otherwise healthy 59-year-old man with improving radicular symptoms.    I reviewed the clinical and radiographic findings in detail with the patient and his wife and discussed management alternatives including surgical and other invasive procedures including epidural steroid injections.  It is clear by history that he is improving day by day possible that the symptoms may resolve without intervention.  We will defer consideration for both surgery or epidural steroid injection and have him continue with physical therapy.  I have asked him to return to the office in 3 weeks for reevaluation, sooner should his symptoms.  Recur to their previous level of severity.  We will provide a work slip for him to remain off work for 2 additional weeks while he undergoes physical therapy.  If his symptoms continue to improve he may return to work following that 2-week.  With commonsense restrictions.    Patient and his wife watched the lumbar herniation video during this visit and we discussed the issues raised in that asset.    Approximately 1 hour was spent in direct contact with the patient and his wife of which just over 50% was spent counseling him regarding clinical and radiographic findings, management alternatives, risks and benefits.    Again, thank you for allowing me to participate in the care of your patient.        Sincerely,        Pablo Velazquez MD

## 2019-10-15 NOTE — PATIENT INSTRUCTIONS
- Dr. Velazquez would like to see you back in the clinic for a follow up visit in 3 weeks to assess symptom progression. Please call the number below to schedule this appointment.     - Letter written to be off work for 2 weeks while you undergo      Please call our clinic with any questions or concerns: [phone]

## 2019-10-15 NOTE — NURSING NOTE
"John Patel is a 59 year old male who presents for:  Chief Complaint   Patient presents with     Back Pain        Initial Vitals:  /70   Pulse 66   Temp 97  F (36.1  C) (Oral)   Resp 16   Ht 6' 4\" (1.93 m)   Wt 225 lb (102.1 kg)   SpO2 90%   BMI 27.39 kg/m   Estimated body mass index is 27.39 kg/m  as calculated from the following:    Height as of this encounter: 6' 4\" (1.93 m).    Weight as of this encounter: 225 lb (102.1 kg).. Body surface area is 2.34 meters squared. BP completed using cuff size: regular  Severe Pain (6)        Nursing Comments: Pt present today for back pain.        Jimi Richards, KATHY    "

## 2019-10-15 NOTE — PROGRESS NOTES
Mr. Patel is a 59-year-old man was seen today for low back and predominant left leg pain.  This began pain began following an episode of walking his dog just over 2 weeks ago.  The dog was frightened by a passing thunderstorm and he ran home with the dog.  He relates twisting his back somewhat.  He also notes that about the same time he had been involved in strenuous activities including lifting bricks.  He noted the onset of pain into his left anterolateral thigh beginning shortly after these episodes and found that this increased somewhat over the next several days.  He denies difficulty with bowel or bladder control.  He relates one prior episode of low back pain which occurred 5 to 6 years ago but was not associated with leg symptoms and resolve spontaneously.  He has not undergone a lumbar epidural steroid injection or other invasive procedure to his lumbar spine.    On examination he appears comfortable, is able to arise from a seated to standing position without difficulty and walks with a nonantalgic gait.  Motor examination of his bilateral lower extremities shows 5/5 strength throughout including plantar and dorsiflexion at the ankles while weightbearing.  There is trace weakness with extension at his left knee using his quadriceps rated at 5-/5.  There is no dermatomal sensory disturbance however.  Straight leg raising sign appears to be negative bilaterally.  Deep tendon reflexes are 1+ and equal at the knees and ankles bilaterally.  There is no ankle clonus noted and toes are downgoing on plantar stimulation.  There is no evidence of pain over the region of the trochanteric bursa.    His recent lumbar MRI scan wer extremity radicular pain.  History is significant for an episode which occurred just over 2 weeks ago when he was walking his dog.  The dog was frightened by the presence of a thunderstorm and he twisted and then ran home with the dog prior to the onset of symptoms into the anterolateral  aspect of his left thigh.  He also participated in strenuous activities about that time including lifting bricks.  He noted the symptoms into his left leg which increased over the first few days and have been persistent for the past 2 weeks.  He states that the pain is present over the anterolateral aspect of his left thigh exacerbated by standing or lumbar extension, relieved by lying down or sitting.  He has no complaint of sensory loss.  He notes some weakness of his left leg which he describes as below the knee but is not had difficulty with ambulation.  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  He reports that initially this was quite severe but that with the use of physical therapy he is experienced some improvement over the past 5 to 6 days.  Presently he notes that he is more comfortable while seated or walking short distances.  However, he states that standing or straightening his back tends to exacerbate this problem.  He has no complaint of symptoms into his right leg.    Review of his recent lumbar MRI scan obtained on October 8, 2019 shows multilevel degenerative lumbar disc changes most pronounced at the L3-4 level where he has a left-sided disc herniation with an extruded fragment extending in a cephalad direction and compressing the exiting left L3 nerve root.  The transiting left L4 nerve root also appears to be impinged in the lateral recess at this level.  Spinal alignment is otherwise satisfactory.  There is no other source of neural impingement noted on this study.    Assessment: Left L3-4 disc herniation with impingement of the exiting left L3 nerve root and descending left L4 nerve root in this otherwise healthy 59-year-old man with improving radicular symptoms.    I reviewed the clinical and radiographic findings in detail with the patient and his wife and discussed management alternatives including surgical and other  invasive procedures including epidural steroid injections.  It is clear by history that he is improving day by day possible that the symptoms may resolve without intervention.  We will defer consideration for both surgery or epidural steroid injection and have him continue with physical therapy.  I have asked him to return to the office in 3 weeks for reevaluation, sooner should his symptoms.  Recur to their previous level of severity.  We will provide a work slip for him to remain off work for 2 additional weeks while he undergoes physical therapy.  If his symptoms continue to improve he may return to work following that 2-week.  With commonsense restrictions.    Patient and his wife watched the lumbar herniation video during this visit and we discussed the issues raised in that asset.    Approximately 1 hour was spent in direct contact with the patient and his wife of which just over 50% was spent counseling him regarding clinical and radiographic findings, management alternatives, risks and benefits.

## 2019-10-16 ENCOUNTER — THERAPY VISIT (OUTPATIENT)
Dept: PHYSICAL THERAPY | Facility: CLINIC | Age: 59
End: 2019-10-16
Payer: COMMERCIAL

## 2019-10-16 ENCOUNTER — DOCUMENTATION ONLY (OUTPATIENT)
Facility: CLINIC | Age: 59
End: 2019-10-16

## 2019-10-16 DIAGNOSIS — M54.16 LUMBAR RADICULOPATHY: ICD-10-CM

## 2019-10-16 PROCEDURE — 97530 THERAPEUTIC ACTIVITIES: CPT | Mod: GP | Performed by: PHYSICAL THERAPIST

## 2019-10-16 PROCEDURE — 97110 THERAPEUTIC EXERCISES: CPT | Mod: GP | Performed by: PHYSICAL THERAPIST

## 2019-10-16 NOTE — PROGRESS NOTES
10/16/2019    FLMA Forms: yes    Emailed: [email or web address]      Type of form: Completed FMLA forms and emailed to Hilton cole at [employer] via the email listed above.     Placed a copy in the bin and sent the original to medical records

## 2019-10-18 ENCOUNTER — THERAPY VISIT (OUTPATIENT)
Dept: PHYSICAL THERAPY | Facility: CLINIC | Age: 59
End: 2019-10-18
Payer: COMMERCIAL

## 2019-10-18 DIAGNOSIS — M54.16 LUMBAR RADICULOPATHY: ICD-10-CM

## 2019-10-18 PROCEDURE — 97530 THERAPEUTIC ACTIVITIES: CPT | Mod: GP | Performed by: PHYSICAL THERAPIST

## 2019-10-18 PROCEDURE — 97110 THERAPEUTIC EXERCISES: CPT | Mod: GP | Performed by: PHYSICAL THERAPIST

## 2019-10-18 PROCEDURE — 97112 NEUROMUSCULAR REEDUCATION: CPT | Mod: GP | Performed by: PHYSICAL THERAPIST

## 2019-10-23 ENCOUNTER — THERAPY VISIT (OUTPATIENT)
Dept: PHYSICAL THERAPY | Facility: CLINIC | Age: 59
End: 2019-10-23
Payer: COMMERCIAL

## 2019-10-23 DIAGNOSIS — M54.16 LUMBAR RADICULOPATHY: ICD-10-CM

## 2019-10-23 PROCEDURE — 97110 THERAPEUTIC EXERCISES: CPT | Mod: GP | Performed by: PHYSICAL THERAPIST

## 2019-10-23 PROCEDURE — 97530 THERAPEUTIC ACTIVITIES: CPT | Mod: GP | Performed by: PHYSICAL THERAPIST

## 2019-10-24 ENCOUNTER — TELEPHONE (OUTPATIENT)
Dept: INTERNAL MEDICINE | Facility: CLINIC | Age: 59
End: 2019-10-24

## 2019-10-24 DIAGNOSIS — M54.16 ACUTE LUMBAR RADICULOPATHY: ICD-10-CM

## 2019-10-29 ENCOUNTER — OFFICE VISIT (OUTPATIENT)
Dept: NEUROSURGERY | Facility: CLINIC | Age: 59
End: 2019-10-29
Attending: NEUROLOGICAL SURGERY
Payer: COMMERCIAL

## 2019-10-29 VITALS
HEIGHT: 76 IN | DIASTOLIC BLOOD PRESSURE: 69 MMHG | RESPIRATION RATE: 16 BRPM | TEMPERATURE: 97 F | WEIGHT: 224 LBS | HEART RATE: 59 BPM | SYSTOLIC BLOOD PRESSURE: 112 MMHG | BODY MASS INDEX: 27.28 KG/M2 | OXYGEN SATURATION: 98 %

## 2019-10-29 DIAGNOSIS — M54.16 LUMBAR RADICULOPATHY: Primary | ICD-10-CM

## 2019-10-29 PROCEDURE — G0463 HOSPITAL OUTPT CLINIC VISIT: HCPCS

## 2019-10-29 PROCEDURE — 99213 OFFICE O/P EST LOW 20 MIN: CPT | Performed by: NEUROLOGICAL SURGERY

## 2019-10-29 ASSESSMENT — MIFFLIN-ST. JEOR: SCORE: 1937.56

## 2019-10-29 ASSESSMENT — PAIN SCALES - GENERAL: PAINLEVEL: NO PAIN (0)

## 2019-10-29 NOTE — LETTER
[facility]  [address] [place] [id number]        October 29, 2019        To Whom it May Concern,      John Patel was seen in clinic today 10/29/2019 and is able to return to work without any restrictions.       Sincerely,            Pablo Velazquez MD  [facility]  [address] [address]    Tel [phone]

## 2019-10-29 NOTE — LETTER
10/29/2019         RE: John Patel  [address] [place] [address]        Dear Colleague,    Thank you for referring your patient, John Patel, to the [facility]. Please see a copy of my visit note below.    Mr. Clements returns and reports continued improvement in his left leg symptoms including an improved ability to ambulate and increasing strength as a result of his physical therapy.  He has minimal to no complaint of radicular pain presently.  He has experienced some paresthesias into his left leg but this has resolved.  He is working without restriction and doing apparently quite well.  He notes some occasional weakness when walking the dog or climbing stairs but this appears to be improving per his report.    On examination today he is comfortable, lumbar range of motion appears normal, gait appears normal and his quadriceps muscle and static testing is 5/5, and improvement since his last clinic visit 2 weeks ago.    Assessment: Progressive improvement With a slowly resolving left L3 and L4 radiculopathy.  I have reviewed the clinical and radiographic findings once again with him in the office.  Is on the cc continued symptomatic and functional improvement I see no indication for surgical intervention, which we discussed.  I have asked him to apply commonsense limitations to his activities, continue with home exercise and contact the office if his symptoms plateau at a functionally unacceptable level or should he suffer a significant exacerbation of his current symptoms.    He appeared to have a good understanding of the situation and was willing to proceed as we had discussed.  I will see him back on an as-needed basis.      Again, thank you for allowing me to participate in the care of your patient.        Sincerely,        Pablo Velazquez MD

## 2019-10-29 NOTE — PATIENT INSTRUCTIONS
-Return to work without any restrictions. Letter given.     -Follow-up as needed.       [facility]   Phone: [phone]  Fax: [phone]

## 2019-10-29 NOTE — NURSING NOTE
"John Patel is a 59 year old male who presents for:  Chief Complaint   Patient presents with     Follow Up        Initial Vitals:  /69   Pulse 59   Temp 97  F (36.1  C) (Oral)   Resp 16   Ht 6' 4\" (1.93 m)   Wt 224 lb (101.6 kg)   SpO2 98%   BMI 27.27 kg/m   Estimated body mass index is 27.27 kg/m  as calculated from the following:    Height as of this encounter: 6' 4\" (1.93 m).    Weight as of this encounter: 224 lb (101.6 kg).. Body surface area is 2.33 meters squared. BP completed using cuff size: regular  No Pain (0)        Nursing Comments: Pt  present today for follow up.        Jimi Richards [facility]    "

## 2019-10-29 NOTE — PROGRESS NOTES
Mr. Clements returns and reports continued improvement in his left leg symptoms including an improved ability to ambulate and increasing strength as a result of his physical therapy.  He has minimal to no complaint of radicular pain presently.  He has experienced some paresthesias into his left leg but this has resolved.  He is working without restriction and doing apparently quite well.  He notes some occasional weakness when walking the dog or climbing stairs but this appears to be improving per his report.    On examination today he is comfortable, lumbar range of motion appears normal, gait appears normal and his quadriceps muscle and static testing is 5/5, and improvement since his last clinic visit 2 weeks ago.    Assessment: Progressive improvement With a slowly resolving left L3 and L4 radiculopathy.  I have reviewed the clinical and radiographic findings once again with him in the office.  Is on the cc continued symptomatic and functional improvement I see no indication for surgical intervention, which we discussed.  I have asked him to apply commonsense limitations to his activities, continue with home exercise and contact the office if his symptoms plateau at a functionally unacceptable level or should he suffer a significant exacerbation of his current symptoms.    He appeared to have a good understanding of the situation and was willing to proceed as we had discussed.  I will see him back on an as-needed basis.

## 2019-11-13 ENCOUNTER — THERAPY VISIT (OUTPATIENT)
Dept: PHYSICAL THERAPY | Facility: CLINIC | Age: 59
End: 2019-11-13
Payer: COMMERCIAL

## 2019-11-13 DIAGNOSIS — M54.16 LUMBAR RADICULOPATHY: ICD-10-CM

## 2019-11-13 PROCEDURE — 97110 THERAPEUTIC EXERCISES: CPT | Mod: GP | Performed by: PHYSICAL THERAPIST

## 2019-11-13 PROCEDURE — 97530 THERAPEUTIC ACTIVITIES: CPT | Mod: GP | Performed by: PHYSICAL THERAPIST

## 2019-11-13 PROCEDURE — 97112 NEUROMUSCULAR REEDUCATION: CPT | Mod: GP | Performed by: PHYSICAL THERAPIST

## 2019-11-13 NOTE — PROGRESS NOTES
"Subjective:  HPI  Oswestry Score: 6 %                 Objective:  System    Physical Exam    General     ROS    Assessment/Plan:    DISCHARGE REPORT    Progress reporting period is from 10 to 11-13-19.       SUBJECTIVE  Subjective changes noted by patient:  .  Subjective: Continues to get better and better--working out at [facility] every day, notes some weakness with descending stairs.    Current pain level is 1/10  .     Previous pain level was  8/10  .   Changes in function:  Yes (See Goal flowsheet attached for changes in current functional level)  Adverse reaction to treatment or activity: None    OBJECTIVE  Changes noted in objective findings:  The objective findings below are from DOS 11-13-19.  Objective: Able to descend 4\" step with good control; improving strength noted L LE; ADELAIDE=6%  STarT Back=LOW    ASSESSMENT/PLAN  Updated problem list and treatment plan: Diagnosis 1:  Lumbar radiculopathy  Pain -  manual therapy, self management, education, directional preference exercise and home program  Decreased ROM/flexibility - manual therapy and therapeutic exercise  Decreased strength - therapeutic exercise and therapeutic activities  Decreased function - therapeutic activities  Impaired posture - neuro re-education  STG/LTGs have been met or progress has been made towards goals:  Yes (See Goal flow sheet completed today.)  Assessment of Progress: The patient has met all of their long term goals.  Self Management Plans:  Patient is independent in a home treatment program.  Patient is independent in self management of symptoms.  I have re-evaluated this patient and find that the nature, scope, duration and intensity of the therapy is appropriate for the medical condition of the patient.  John continues to require the following intervention to meet STG and LTG's:  PT intervention is no longer required to meet STG/LTG.    Recommendations:  This patient is ready to be discharged from therapy and continue their home " treatment program.    Please refer to the daily flowsheet for treatment today, total treatment time and time spent performing 1:1 timed codes.

## 2019-12-25 DIAGNOSIS — I48.91 ATRIAL FIBRILLATION, UNSPECIFIED TYPE (H): ICD-10-CM

## 2019-12-26 NOTE — TELEPHONE ENCOUNTER
Requested Prescriptions   Pending Prescriptions Disp Refills     ELIQUIS ANTICOAGULANT 5 MG tablet [Pharmacy Med Name: ELIQUIS  5MG  TAB]  Last Written Prescription Date:  8/6/2019  Last Fill Quantity: 180,  # refills: 1   Last office visit: 10/7/2019 with prescribing provider:     Future Office Visit:   180 tablet 1     Sig: TAKE 1 TABLET BY MOUTH TWO  TIMES DAILY       Direct Oral Anticoagulant Agents Passed - 12/25/2019  8:46 PM        Passed - Normal Platelets on file in past 12 months     Recent Labs   Lab Test 08/06/19  0922                  Passed - Medication is active on med list        Passed - Patient is 18-79 years of age        Passed - Serum creatinine less than or equal to 1.4 on file in past 12 mos     Recent Labs   Lab Test 08/06/19  0922   CR 1.01             Passed - Weight is greater than 60 kg for the past year     Wt Readings from Last 3 Encounters:   10/29/19 101.6 kg (224 lb)   10/15/19 102.1 kg (225 lb)   10/09/19 104.3 kg (230 lb)             Passed - Recent (6 mo) or future (30 days) visit within the authorizing provider's specialty

## 2019-12-30 RX ORDER — APIXABAN 5 MG/1
TABLET, FILM COATED ORAL
Qty: 180 TABLET | Refills: 1 | OUTPATIENT
Start: 2019-12-30

## 2020-01-03 NOTE — TELEPHONE ENCOUNTER
Fax received stating patient has requested refill to prepare for future fills. Any prescriptions that are too soon will not be filled at this time and will be kept on file until next available fill date. Last office visit 8/6/19 patient advised follow up in 1 year.

## 2020-07-04 DIAGNOSIS — G25.0 BENIGN ESSENTIAL TREMOR: ICD-10-CM

## 2020-07-04 DIAGNOSIS — I42.8 NONISCHEMIC CARDIOMYOPATHY (H): ICD-10-CM

## 2020-07-06 RX ORDER — PROPRANOLOL HYDROCHLORIDE 20 MG/1
TABLET ORAL
Qty: 180 TABLET | Refills: 0 | Status: SHIPPED | OUTPATIENT
Start: 2020-07-06 | End: 2020-08-07

## 2020-07-06 RX ORDER — LISINOPRIL 2.5 MG/1
TABLET ORAL
Qty: 90 TABLET | Refills: 0 | Status: SHIPPED | OUTPATIENT
Start: 2020-07-06 | End: 2020-08-07

## 2020-07-06 NOTE — TELEPHONE ENCOUNTER
Prescription approved per [facility].    Next 5 appointments (look out 90 days)    Aug 07, 2020  8:20 AM CDT  [facility] [address]  [facility] [id number] [phone]

## 2020-07-06 NOTE — TELEPHONE ENCOUNTER
"Requested Prescriptions   Pending Prescriptions Disp Refills     lisinopril (ZESTRIL) 2.5 MG tablet [Pharmacy Med Name: LISINOPRIL  2.5MG  TAB] 90 tablet 3     Sig: TAKE 1 TABLET BY MOUTH  DAILY       ACE Inhibitors (Including Combos) Protocol Passed - 7/4/2020  9:29 PM        Passed - Blood pressure under 140/90 in past 12 months     BP Readings from Last 3 Encounters:   10/29/19 112/69   10/15/19 102/70   10/09/19 130/78                 Passed - Recent (12 mo) or future (30 days) visit within the authorizing provider's specialty     Patient has had an office visit with the authorizing provider or a provider within the authorizing providers department within the previous 12 mos or has a future within next 30 days. See \"Patient Info\" tab in inbasket, or \"Choose Columns\" in Meds & Orders section of the refill encounter.              Passed - Medication is active on med list        Passed - Patient is age 18 or older        Passed - Normal serum creatinine on file in past 12 months     Recent Labs   Lab Test 08/06/19  0922   CR 1.01       Ok to refill medication if creatinine is low          Passed - Normal serum potassium on file in past 12 months     Recent Labs   Lab Test 08/06/19  0922   POTASSIUM 5.0                propranolol (INDERAL) 20 MG tablet [Pharmacy Med Name: PROPRANOLOL  20MG  TAB] 180 tablet 3     Sig: TAKE 1 TABLET BY MOUTH TWO  TIMES DAILY       Beta-Blockers Protocol Passed - 7/4/2020  9:29 PM        Passed - Blood pressure under 140/90 in past 12 months     BP Readings from Last 3 Encounters:   10/29/19 112/69   10/15/19 102/70   10/09/19 130/78                 Passed - Patient is age 6 or older        Passed - Recent (12 mo) or future (30 days) visit within the authorizing provider's specialty     Patient has had an office visit with the authorizing provider or a provider within the authorizing providers department within the previous 12 mos or has a future within next 30 days. See \"Patient Info\" " "tab in inbasket, or \"Choose Columns\" in Meds & Orders section of the refill encounter.              Passed - Medication is active on med list             "

## 2020-08-07 ENCOUNTER — OFFICE VISIT (OUTPATIENT)
Dept: INTERNAL MEDICINE | Facility: CLINIC | Age: 60
End: 2020-08-07
Payer: COMMERCIAL

## 2020-08-07 VITALS
SYSTOLIC BLOOD PRESSURE: 104 MMHG | BODY MASS INDEX: 26.49 KG/M2 | OXYGEN SATURATION: 99 % | DIASTOLIC BLOOD PRESSURE: 70 MMHG | RESPIRATION RATE: 18 BRPM | HEIGHT: 76 IN | WEIGHT: 217.5 LBS | TEMPERATURE: 98.7 F | HEART RATE: 53 BPM

## 2020-08-07 DIAGNOSIS — Z13.6 CARDIOVASCULAR SCREENING; LDL GOAL LESS THAN 160: ICD-10-CM

## 2020-08-07 DIAGNOSIS — D36.9 ADENOMATOUS POLYPS: ICD-10-CM

## 2020-08-07 DIAGNOSIS — Z00.00 ROUTINE GENERAL MEDICAL EXAMINATION AT A HEALTH CARE FACILITY: Primary | ICD-10-CM

## 2020-08-07 DIAGNOSIS — G25.0 BENIGN ESSENTIAL TREMOR: ICD-10-CM

## 2020-08-07 DIAGNOSIS — Z12.5 SPECIAL SCREENING FOR MALIGNANT NEOPLASM OF PROSTATE: ICD-10-CM

## 2020-08-07 DIAGNOSIS — Z79.899 MEDICATION MANAGEMENT: ICD-10-CM

## 2020-08-07 DIAGNOSIS — I42.8 NONISCHEMIC CARDIOMYOPATHY (H): ICD-10-CM

## 2020-08-07 DIAGNOSIS — I48.91 ATRIAL FIBRILLATION, UNSPECIFIED TYPE (H): ICD-10-CM

## 2020-08-07 LAB
ERYTHROCYTE [DISTWIDTH] IN BLOOD BY AUTOMATED COUNT: 12 % (ref 10–15)
HCT VFR BLD AUTO: 46.7 % (ref 40–53)
HGB BLD-MCNC: 15.4 G/DL (ref 13.3–17.7)
MCH RBC QN AUTO: 30.8 PG (ref 26.5–33)
MCHC RBC AUTO-ENTMCNC: 33 G/DL (ref 31.5–36.5)
MCV RBC AUTO: 93 FL (ref 78–100)
PLATELET # BLD AUTO: 243 10E9/L (ref 150–450)
RBC # BLD AUTO: 5 10E12/L (ref 4.4–5.9)
WBC # BLD AUTO: 4.7 10E9/L (ref 4–11)

## 2020-08-07 PROCEDURE — 99213 OFFICE O/P EST LOW 20 MIN: CPT | Mod: 25 | Performed by: INTERNAL MEDICINE

## 2020-08-07 PROCEDURE — 36415 COLL VENOUS BLD VENIPUNCTURE: CPT | Performed by: INTERNAL MEDICINE

## 2020-08-07 PROCEDURE — G0103 PSA SCREENING: HCPCS | Performed by: INTERNAL MEDICINE

## 2020-08-07 PROCEDURE — 84443 ASSAY THYROID STIM HORMONE: CPT | Performed by: INTERNAL MEDICINE

## 2020-08-07 PROCEDURE — 99396 PREV VISIT EST AGE 40-64: CPT | Performed by: INTERNAL MEDICINE

## 2020-08-07 PROCEDURE — 80061 LIPID PANEL: CPT | Performed by: INTERNAL MEDICINE

## 2020-08-07 PROCEDURE — 80053 COMPREHEN METABOLIC PANEL: CPT | Performed by: INTERNAL MEDICINE

## 2020-08-07 PROCEDURE — 85027 COMPLETE CBC AUTOMATED: CPT | Performed by: INTERNAL MEDICINE

## 2020-08-07 RX ORDER — LISINOPRIL 2.5 MG/1
2.5 TABLET ORAL DAILY
Qty: 90 TABLET | Refills: 3 | Status: SHIPPED | OUTPATIENT
Start: 2020-08-07 | End: 2021-08-11

## 2020-08-07 RX ORDER — PROPRANOLOL HYDROCHLORIDE 20 MG/1
TABLET ORAL
Qty: 180 TABLET | Refills: 3 | Status: SHIPPED | OUTPATIENT
Start: 2020-08-07 | End: 2021-08-11

## 2020-08-07 SDOH — SOCIAL STABILITY: SOCIAL INSECURITY
WITHIN THE LAST YEAR, HAVE YOU BEEN KICKED, HIT, SLAPPED, OR OTHERWISE PHYSICALLY HURT BY YOUR PARTNER OR EX-PARTNER?: NO

## 2020-08-07 SDOH — ECONOMIC STABILITY: INCOME INSECURITY: HOW HARD IS IT FOR YOU TO PAY FOR THE VERY BASICS LIKE FOOD, HOUSING, MEDICAL CARE, AND HEATING?: NOT HARD AT ALL

## 2020-08-07 SDOH — ECONOMIC STABILITY: TRANSPORTATION INSECURITY
IN THE PAST 12 MONTHS, HAS THE LACK OF TRANSPORTATION KEPT YOU FROM MEDICAL APPOINTMENTS OR FROM GETTING MEDICATIONS?: NO

## 2020-08-07 SDOH — ECONOMIC STABILITY: FOOD INSECURITY: WITHIN THE PAST 12 MONTHS, THE FOOD YOU BOUGHT JUST DIDN'T LAST AND YOU DIDN'T HAVE MONEY TO GET MORE.: NEVER TRUE

## 2020-08-07 SDOH — SOCIAL STABILITY: SOCIAL INSECURITY
WITHIN THE LAST YEAR, HAVE TO BEEN RAPED OR FORCED TO HAVE ANY KIND OF SEXUAL ACTIVITY BY YOUR PARTNER OR EX-PARTNER?: NO

## 2020-08-07 SDOH — ECONOMIC STABILITY: TRANSPORTATION INSECURITY
IN THE PAST 12 MONTHS, HAS LACK OF TRANSPORTATION KEPT YOU FROM MEETINGS, WORK, OR FROM GETTING THINGS NEEDED FOR DAILY LIVING?: NO

## 2020-08-07 SDOH — HEALTH STABILITY: MENTAL HEALTH
STRESS IS WHEN SOMEONE FEELS TENSE, NERVOUS, ANXIOUS, OR CAN'T SLEEP AT NIGHT BECAUSE THEIR MIND IS TROUBLED. HOW STRESSED ARE YOU?: NOT AT ALL

## 2020-08-07 SDOH — HEALTH STABILITY: MENTAL HEALTH: HOW OFTEN DO YOU HAVE A DRINK CONTAINING ALCOHOL?: 2-3 TIMES A WEEK

## 2020-08-07 SDOH — HEALTH STABILITY: PHYSICAL HEALTH: ON AVERAGE, HOW MANY DAYS PER WEEK DO YOU ENGAGE IN MODERATE TO STRENUOUS EXERCISE (LIKE A BRISK WALK)?: 7 DAYS

## 2020-08-07 SDOH — HEALTH STABILITY: MENTAL HEALTH: HOW MANY STANDARD DRINKS CONTAINING ALCOHOL DO YOU HAVE ON A TYPICAL DAY?: 1 OR 2

## 2020-08-07 SDOH — SOCIAL STABILITY: SOCIAL INSECURITY: WITHIN THE LAST YEAR, HAVE YOU BEEN AFRAID OF YOUR PARTNER OR EX-PARTNER?: NO

## 2020-08-07 SDOH — HEALTH STABILITY: PHYSICAL HEALTH: ON AVERAGE, HOW MANY MINUTES DO YOU ENGAGE IN EXERCISE AT THIS LEVEL?: 30 MIN

## 2020-08-07 SDOH — SOCIAL STABILITY: SOCIAL INSECURITY: WITHIN THE LAST YEAR, HAVE YOU BEEN HUMILIATED OR EMOTIONALLY ABUSED IN OTHER WAYS BY YOUR PARTNER OR EX-PARTNER?: NO

## 2020-08-07 SDOH — ECONOMIC STABILITY: FOOD INSECURITY: WITHIN THE PAST 12 MONTHS, YOU WORRIED THAT YOUR FOOD WOULD RUN OUT BEFORE YOU GOT MONEY TO BUY MORE.: NEVER TRUE

## 2020-08-07 ASSESSMENT — ENCOUNTER SYMPTOMS
CONSTIPATION: 0
FEVER: 0
HEARTBURN: 0
CHILLS: 0
MYALGIAS: 0
PARESTHESIAS: 0
NERVOUS/ANXIOUS: 0
ABDOMINAL PAIN: 0
DIARRHEA: 0
COUGH: 0
EYE PAIN: 0
NAUSEA: 0
WEAKNESS: 0
HEMATURIA: 0
HEMATOCHEZIA: 0
HEADACHES: 0
PALPITATIONS: 0
SHORTNESS OF BREATH: 0
ARTHRALGIAS: 0
JOINT SWELLING: 0
DYSURIA: 0
SORE THROAT: 0
DIZZINESS: 0
FREQUENCY: 0

## 2020-08-07 ASSESSMENT — MIFFLIN-ST. JEOR: SCORE: 1903.07

## 2020-08-07 NOTE — PROGRESS NOTES
SUBJECTIVE:   CC: John Patel is an 60 year old male who presents for preventative health visit.     Patient is being seen for a physical.  Healthy Habits:     Getting at least 3 servings of Calcium per day:  Yes    Bi-annual eye exam:  NO    Dental care twice a year:  Yes    Sleep apnea or symptoms of sleep apnea:  None    Diet:  Regular (no restrictions)    Frequency of exercise:  6-7 days/week    Duration of exercise:  30-45 minutes    Taking medications regularly:  Yes    Medication side effects:  None    PHQ-2 Total Score: 0    Additional concerns today:  No      PROBLEMS TO ADD ON...  In addition to a physical exam, we addreseed chronic medical problems:     The patient has been seeing cardiology yearly for nonischemic cardiomyopathy and atrial fibrillation.   He is s/p cardiac ablation but has been continued on Eliquis.     Reviewed most recent echo (5/20/2020) showing ventricular enlargement with normal ejection fraction.   Reviewed his Nuclear Medicine stress test from 6/5/2019 showing no cardiac ischemia.   He denies any chest discomfort, dyspnea, dizziness or palpitations.     Tremor has been satisfactorily controlled on propranolol.     He was diagnosed with lumbar disc herniation last year, noting pain in the left low back radiating into the left thigh. This responded to conservative measures.       Today's PHQ-2 Score:   PHQ-2 ( 1999 Pfizer) 8/6/2019   Q1: Little interest or pleasure in doing things 0   Q2: Feeling down, depressed or hopeless 0   PHQ-2 Score 0   Q1: Little interest or pleasure in doing things Not at all   Q2: Feeling down, depressed or hopeless Not at all   PHQ-2 Score 0       Abuse: Current or Past(Physical, Sexual or Emotional)- No  Do you feel safe in your environment? Yes        Social History     Tobacco Use     Smoking status: Never Smoker     Smokeless tobacco: Never Used   Substance Use Topics     Alcohol use: Yes     Alcohol/week: 0.0 standard drinks     Comment: 2-3  drink weekly         Alcohol Use 8/6/2019   Prescreen: >3 drinks/day or >7 drinks/week? No   Prescreen: >3 drinks/day or >7 drinks/week? -       Last PSA:   PSA   Date Value Ref Range Status   08/16/2018 0.65 0 - 4 ug/L Final     Comment:     Assay Method:  Chemiluminescence using Siemens Vista analyzer       Reviewed orders with patient. Reviewed health maintenance and updated orders accordingly - Yes    Reviewed and updated as needed this visit by clinical staff       Past medical, family and social histories as well as medications reviewed and updated as needed.    Reviewed and updated as needed this visit by Provider        Review of Systems   Constitutional: Negative for chills and fever.   HENT: Negative for congestion, ear pain, hearing loss and sore throat.    Eyes: Negative for pain and visual disturbance.   Respiratory: Negative for cough and shortness of breath.    Cardiovascular: Negative for chest pain, palpitations and peripheral edema.   Gastrointestinal: Negative for abdominal pain, constipation, diarrhea, heartburn, hematochezia and nausea.   Genitourinary: Negative for dysuria, frequency, genital sores, hematuria and urgency.   Musculoskeletal: Negative for arthralgias, joint swelling and myalgias.   Skin: Positive for rash.   Neurological: Negative for dizziness, weakness, headaches and paresthesias.   Psychiatric/Behavioral: Negative for mood changes. The patient is not nervous/anxious.      CONSTITUTIONAL: NEGATIVE for fever, chills, change in weight  INTEGUMENTARY/SKIN: NEGATIVE for worrisome rashes, moles or lesions  EYES: NEGATIVE for vision changes or irritation  ENT: NEGATIVE for ear, mouth and throat problems  RESP: NEGATIVE for significant cough or SOB  CV: NEGATIVE for chest pain, palpitations or peripheral edema  GI: NEGATIVE for nausea, abdominal pain, heartburn, or change in bowel habits   male: negative for dysuria, hematuria, decreased urinary stream, erectile dysfunction,  "urethral discharge  MUSCULOSKELETAL: NEGATIVE for significant arthralgias or myalgia  NEURO: NEGATIVE for weakness, dizziness or paresthesias  ENDOCRINE: NEGATIVE for temperature intolerance, skin/hair changes  HEME/ALLERGY/IMMUNE: NEGATIVE for bleeding problems  PSYCHIATRIC: NEGATIVE for changes in mood or affect    OBJECTIVE:   Vitals: /70   Pulse 53   Temp 98.7  F (37.1  C) (Oral)   Resp 18   Ht 1.93 m (6' 4\")   Wt 98.7 kg (217 lb 8 oz)   SpO2 99%   BMI 26.47 kg/m    BMI= Body mass index is 26.47 kg/m .     Physical Exam  GENERAL: healthy, alert and no distress  EYES: Eyes grossly normal to inspection, PERRL and conjunctivae and sclerae normal  HENT: ear canals and TM's normal, nose and mouth without ulcers or lesions  NECK: no adenopathy, no asymmetry, masses, or scars and thyroid normal to palpation  RESP: lungs clear to auscultation - no rales, rhonchi or wheezes  CV: regular rate and rhythm, normal S1 S2, no S3 or S4, no murmur, click or rub, no peripheral edema and peripheral pulses strong  ABDOMEN: soft, nontender, no hepatosplenomegaly, no masses and bowel sounds normal  MS: no gross musculoskeletal defects noted, no edema  SKIN: no suspicious lesions or rashes  NEURO: Normal strength and tone, mentation intact and speech normal  PSYCH: mentation appears normal, affect normal/bright    Diagnostic Test Results:  Labs reviewed in Epic    ASSESSMENT/PLAN:   (Z00.00) Routine general medical examination at a health care facility  (primary encounter diagnosis)  Comment: Stable health. See epic orders.     (I42.8) Nonischemic cardiomyopathy (H)  Comment: No symptoms or signs of CHF. Continue current meds.   Plan: lisinopril (ZESTRIL) 2.5 MG tablet,         OFFICE/OUTPT VISIT,EST,LEVL III          (I48.91) Atrial fibrillation, unspecified type (H)  Comment: Patient has maintained sinus rhythm since second ablation.   Plan: TSH with free T4 reflex, apixaban ANTICOAGULANT        (ELIQUIS ANTICOAGULANT) " "5 MG tablet,         OFFICE/OUTPT VISIT,EST,LEVL III         (G25.0) Benign essential tremor  Comment: satisfactorily controlled. Continue current meds.   Plan: propranolol (INDERAL) 20 MG tablet,         OFFICE/OUTPT VISIT,EST,LEVL III         (D36.9) Adenomatous polyps  Comment: Due for colonoscopy.   Plan: GASTROENTEROLOGY ADULT REF PROCEDURE ONLY          (Z13.6) CARDIOVASCULAR SCREENING; LDL GOAL LESS THAN 160  Comment: Update lipids.   Plan: Comprehensive metabolic panel, Lipid panel         reflex to direct LDL Fasting          (Z12.5) Special screening for malignant neoplasm of prostate  Plan: Prostate spec antigen screen          (Z79.899) Medication management  Plan: TSH with free T4 reflex, CBC with platelets            COUNSELING:   Reviewed preventive health counseling, as reflected in patient instructions    Estimated body mass index is 27.27 kg/m  as calculated from the following:    Height as of 10/29/19: 1.93 m (6' 4\").    Weight as of 10/29/19: 101.6 kg (224 lb).          reports that he has never smoked. He has never used smokeless tobacco.      Counseling Resources:  ATP IV Guidelines  Pooled Cohorts Equation Calculator  FRAX Risk Assessment  ICSI Preventive Guidelines  Dietary Guidelines for Americans, 2010  USDA's MyPlate  ASA Prophylaxis  Lung CA Screening    Marc Mahmood MD, MD  [facility]  "

## 2020-08-07 NOTE — LETTER
[facility]  [address] [place] [address]  [phone]    August 7, 2020      Regarding:  John Patel  [facility]   [facility] [address]           To whom it may concern:     John Craft Jorge follows with me in our medical clinic. 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     If you have any further questions or problems, please contact our office.    Sincerely,        Marc Mahmood MD

## 2020-08-07 NOTE — PATIENT INSTRUCTIONS
Preventive Health Recommendations  Male Ages 50 - 64    Yearly exam:             See your health care provider every year in order to  o   Review health changes.   o   Discuss preventive care.    o   Review your medicines if your doctor has prescribed any.     Have a cholesterol test every 5 years, or more frequently if you are at risk for high cholesterol/heart disease.     Have a diabetes test (fasting glucose) every three years. If you are at risk for diabetes, you should have this test more often.     Have a colonoscopy at age 50, or have a yearly FIT test (stool test). These exams will check for colon cancer.      Talk with your health care provider about whether or not a prostate cancer screening test (PSA) is right for you.    You should be tested each year for STDs (sexually transmitted diseases), if you re at risk.     Shots: Get a flu shot each year. Get a tetanus shot every 10 years.     Nutrition:    Eat at least 5 servings of fruits and vegetables daily.     Eat whole-grain bread, whole-wheat pasta and brown rice instead of white grains and rice.     Get adequate Calcium and Vitamin D.     Lifestyle    Exercise for at least 150 minutes a week (30 minutes a day, 5 days a week). This will help you control your weight and prevent disease.     Limit alcohol to one drink per day.     No smoking.     Wear sunscreen to prevent skin cancer.     See your dentist every six months for an exam and cleaning.   See your eye doctor every 1 to 2 years.          Everything looks fine!    Refills of medications have been faxed to your pharmacy.     I'll get back to you with lab results soon, especially if there is anything of concern.      See me in a year, sooner if problems.

## 2020-08-08 LAB
ALBUMIN SERPL-MCNC: 3.8 G/DL (ref 3.4–5)
ALP SERPL-CCNC: 77 U/L (ref 40–150)
ALT SERPL W P-5'-P-CCNC: 28 U/L (ref 0–70)
ANION GAP SERPL CALCULATED.3IONS-SCNC: 6 MMOL/L (ref 3–14)
AST SERPL W P-5'-P-CCNC: 29 U/L (ref 0–45)
BILIRUB SERPL-MCNC: 0.8 MG/DL (ref 0.2–1.3)
BUN SERPL-MCNC: 19 MG/DL (ref 7–30)
CALCIUM SERPL-MCNC: 9.1 MG/DL (ref 8.5–10.1)
CHLORIDE SERPL-SCNC: 106 MMOL/L (ref 94–109)
CHOLEST SERPL-MCNC: 187 MG/DL
CO2 SERPL-SCNC: 27 MMOL/L (ref 20–32)
CREAT SERPL-MCNC: 1.07 MG/DL (ref 0.66–1.25)
GFR SERPL CREATININE-BSD FRML MDRD: 75 ML/MIN/{1.73_M2}
GLUCOSE SERPL-MCNC: 93 MG/DL (ref 70–99)
HDLC SERPL-MCNC: 49 MG/DL
LDLC SERPL CALC-MCNC: 117 MG/DL
NONHDLC SERPL-MCNC: 138 MG/DL
POTASSIUM SERPL-SCNC: 5.5 MMOL/L (ref 3.4–5.3)
PROT SERPL-MCNC: 7.3 G/DL (ref 6.8–8.8)
PSA SERPL-ACNC: 0.72 UG/L (ref 0–4)
SODIUM SERPL-SCNC: 139 MMOL/L (ref 133–144)
TRIGL SERPL-MCNC: 104 MG/DL
TSH SERPL DL<=0.005 MIU/L-ACNC: 1.24 MU/L (ref 0.4–4)

## 2020-09-16 ENCOUNTER — TRANSFERRED RECORDS (OUTPATIENT)
Dept: HEALTH INFORMATION MANAGEMENT | Facility: CLINIC | Age: 60
End: 2020-09-16

## 2020-10-13 DIAGNOSIS — Z11.59 ENCOUNTER FOR SCREENING FOR OTHER VIRAL DISEASES: Primary | ICD-10-CM

## 2020-10-21 ENCOUNTER — TELEPHONE (OUTPATIENT)
Dept: INTERNAL MEDICINE | Facility: CLINIC | Age: 60
End: 2020-10-21

## 2020-10-21 NOTE — TELEPHONE ENCOUNTER
Patient will have a colonoscopy on 11/4/20 and he needs to know if and when he should stop his eliquis.

## 2020-10-22 NOTE — TELEPHONE ENCOUNTER
RN: Please advise pt.:     He should stop Eliquis for two full days prior to colonoscopy (four doses).

## 2020-11-01 DIAGNOSIS — Z11.59 ENCOUNTER FOR SCREENING FOR OTHER VIRAL DISEASES: ICD-10-CM

## 2020-11-01 LAB
SARS-COV-2 RNA SPEC QL NAA+PROBE: NORMAL
SPECIMEN SOURCE: NORMAL

## 2020-11-01 PROCEDURE — U0003 INFECTIOUS AGENT DETECTION BY NUCLEIC ACID (DNA OR RNA); SEVERE ACUTE RESPIRATORY SYNDROME CORONAVIRUS 2 (SARS-COV-2) (CORONAVIRUS DISEASE [COVID-19]), AMPLIFIED PROBE TECHNIQUE, MAKING USE OF HIGH THROUGHPUT TECHNOLOGIES AS DESCRIBED BY CMS-2020-01-R: HCPCS | Performed by: INTERNAL MEDICINE

## 2020-11-02 LAB
LABORATORY COMMENT REPORT: NORMAL
SARS-COV-2 RNA SPEC QL NAA+PROBE: NEGATIVE
SPECIMEN SOURCE: NORMAL

## 2020-11-04 ENCOUNTER — HOSPITAL ENCOUNTER (OUTPATIENT)
Facility: CLINIC | Age: 60
Discharge: HOME | End: 2020-11-04
Attending: INTERNAL MEDICINE | Admitting: INTERNAL MEDICINE
Payer: COMMERCIAL

## 2020-11-04 VITALS
HEIGHT: 76 IN | SYSTOLIC BLOOD PRESSURE: 112 MMHG | TEMPERATURE: 98.1 F | DIASTOLIC BLOOD PRESSURE: 80 MMHG | BODY MASS INDEX: 26.18 KG/M2 | OXYGEN SATURATION: 97 % | WEIGHT: 215 LBS | RESPIRATION RATE: 14 BRPM | HEART RATE: 55 BPM

## 2020-11-04 LAB — COLONOSCOPY: NORMAL

## 2020-11-04 PROCEDURE — 88305 TISSUE EXAM BY PATHOLOGIST: CPT | Mod: 26

## 2020-11-04 PROCEDURE — G0500 MOD SEDAT ENDO SERVICE >5YRS: HCPCS | Performed by: INTERNAL MEDICINE

## 2020-11-04 PROCEDURE — 250N000013 HC RX MED GY IP 250 OP 250 PS 637: Performed by: INTERNAL MEDICINE

## 2020-11-04 PROCEDURE — 250N000011 HC RX IP 250 OP 636: Performed by: INTERNAL MEDICINE

## 2020-11-04 PROCEDURE — 88305 TISSUE EXAM BY PATHOLOGIST: CPT | Mod: TC | Performed by: INTERNAL MEDICINE

## 2020-11-04 PROCEDURE — 45385 COLONOSCOPY W/LESION REMOVAL: CPT | Mod: PT | Performed by: INTERNAL MEDICINE

## 2020-11-04 PROCEDURE — 99153 MOD SED SAME PHYS/QHP EA: CPT | Performed by: INTERNAL MEDICINE

## 2020-11-04 PROCEDURE — 99152 MOD SED SAME PHYS/QHP 5/>YRS: CPT | Performed by: INTERNAL MEDICINE

## 2020-11-04 RX ORDER — PROCHLORPERAZINE MALEATE 10 MG
10 TABLET ORAL EVERY 6 HOURS PRN
Status: DISCONTINUED | OUTPATIENT
Start: 2020-11-04 | End: 2020-11-04 | Stop reason: HOSPADM

## 2020-11-04 RX ORDER — LIDOCAINE 40 MG/G
CREAM TOPICAL
Status: DISCONTINUED | OUTPATIENT
Start: 2020-11-04 | End: 2020-11-04 | Stop reason: HOSPADM

## 2020-11-04 RX ORDER — FLUMAZENIL 0.1 MG/ML
0.2 INJECTION, SOLUTION INTRAVENOUS
Status: DISCONTINUED | OUTPATIENT
Start: 2020-11-04 | End: 2020-11-04 | Stop reason: HOSPADM

## 2020-11-04 RX ORDER — SIMETHICONE 40MG/0.6ML
SUSPENSION, DROPS(FINAL DOSAGE FORM)(ML) ORAL PRN
Status: DISCONTINUED | OUTPATIENT
Start: 2020-11-04 | End: 2020-11-04 | Stop reason: HOSPADM

## 2020-11-04 RX ORDER — ONDANSETRON 2 MG/ML
4 INJECTION INTRAMUSCULAR; INTRAVENOUS
Status: DISCONTINUED | OUTPATIENT
Start: 2020-11-04 | End: 2020-11-04 | Stop reason: HOSPADM

## 2020-11-04 RX ORDER — ONDANSETRON 4 MG/1
4 TABLET, ORALLY DISINTEGRATING ORAL EVERY 6 HOURS PRN
Status: DISCONTINUED | OUTPATIENT
Start: 2020-11-04 | End: 2020-11-04 | Stop reason: HOSPADM

## 2020-11-04 RX ORDER — ONDANSETRON 2 MG/ML
4 INJECTION INTRAMUSCULAR; INTRAVENOUS EVERY 6 HOURS PRN
Status: DISCONTINUED | OUTPATIENT
Start: 2020-11-04 | End: 2020-11-04 | Stop reason: HOSPADM

## 2020-11-04 RX ORDER — NALOXONE HYDROCHLORIDE 0.4 MG/ML
.1-.4 INJECTION, SOLUTION INTRAMUSCULAR; INTRAVENOUS; SUBCUTANEOUS
Status: DISCONTINUED | OUTPATIENT
Start: 2020-11-04 | End: 2020-11-04 | Stop reason: HOSPADM

## 2020-11-04 RX ORDER — FENTANYL CITRATE 50 UG/ML
INJECTION, SOLUTION INTRAMUSCULAR; INTRAVENOUS PRN
Status: DISCONTINUED | OUTPATIENT
Start: 2020-11-04 | End: 2020-11-04 | Stop reason: HOSPADM

## 2020-11-04 ASSESSMENT — MIFFLIN-ST. JEOR: SCORE: 1891.73

## 2020-11-04 NOTE — PROCEDURES
PRE-PROCEDURE H&P    CHIEF COMPLAINT / REASON FOR PROCEDURE:  surveillance    PERTINENT HISTORY :    Past Medical History:   Diagnosis Date     Acute lumbar radiculopathy      Allergic rhinitis, cause unspecified      Arrhythmia      Atrial fibrillation (H)      Cardiomyopathy (H)      Colon polyp     pre-cancerous and non-cancerous     Hypertension     [facility]     Lumbar disc herniation 2019    Responded to conservative mgmt     Snores       Past Surgical History:   Procedure Laterality Date     ANESTHESIA CARDIOVERSION N/A 12/28/2015    Procedure: ANESTHESIA CARDIOVERSION;  Surgeon: GENERIC ANESTHESIA PROVIDER;  Location: [facility] OR     CARDIAC SURGERY  8/18/17    Ablation     COLONOSCOPY N/A 11/10/2015    No polyps. Sigmoid diverticulosis. Repeat in 5 years. Procedure: COLONOSCOPY;  Surgeon: Edin Wallace MD, MD;  Location:  GI     HERNIORRHAPHY INGUINAL  12/27/2011    Procedure:HERNIORRHAPHY INGUINAL; LEFT INGUINAL HERNIA REPAIR WITH MESH; Surgeon:SYLVESTER KOEHLER; Location: [facility]     HERNIORRHAPHY INGUINAL Right 8/30/2016    Procedure: HERNIORRHAPHY INGUINAL;  Surgeon: Sixto Wolf MD;  Location: [facility] OR     ORTHOPEDIC SURGERY  ~2008    bunionectomy on right         Bleeding tendencies:  No    Relevant Family History:  NONE     Relevant Social History:  NONE      A relevant review of systems was performed and was negative      ALLERGIES/SENSITIVITIES:   Allergies   Allergen Reactions     No Known Drug Allergies        CURRENT MEDICATIONS:   No current outpatient medications on file.        PRE-SEDATION ASSESSMENT:    Lung Exam:  normal  Heart Exam:  normal  Airway Exam: normal  Previous reaction to anesthesia/sedation:   No  Sedation plan based on assessment: Moderate (conscious) sedation  ASA Classification:  2 - Mild systemic disease        IMPRESSION:  surveillance    PLAN:  colonoscopy     Brisa Guerrero MD  [facility]  Office: [phone]

## 2020-11-04 NOTE — LETTER
October 19, 2020      John Venancio Jorge  [address]   [place] [address]         Please be aware that coverage of these services is subject to the terms and limitations of your health insurance plan.  Call member services at your health plan with any benefit or coverage questions.    Thank you for choosing [facility]. You are scheduled for the following service(s):    Date:  Wednesday November 4, 2020             Procedure:  COLONOSCOPY  Doctor:        Dr Guerrero   Arrival Time:  1045  *Enter and check in at the Main Hospital Entrance*  Procedure Time:  1115      Location:   [facility]        Endoscopy Department, First Floor         [address] [place], [place] [address]      [phone] or [phone] () to reschedule      MIRALAX -GATORADE  PREP  Colonoscopy is the most accurate test to detect colon polyps and colon cancer; and the only test where polyps can be removed. During this procedure, a doctor examines the lining of your large intestine and rectum through a flexible tube.   Transportation  You must arrange for a ride for the day of your procedure with a responsible adult. A taxi , Uber, etc, is not an option unless you are accompanied by a responsible adult. If you fail to arrange transportation with a responsible adult, your procedure will be cancelled and rescheduled.    Purchase the  following supplies at your local pharmacy:  - 2 (two) bisacodyl tablets: each tablet contains 5 mg.  (Dulcolax  laxative NOT Dulcolax  stool softener)   - 1 (one) 8.3 oz bottle of Polyethylene Glycol (PEG) 3350 Powder   (MiraLAX , Smooth LAX , ClearLAX  or equivalent)  - 64 oz Gatorade    Regular Gatorade, Gatorade G2 , Powerade , Powerade Zero  or Pedialyte  is acceptable. Red colored flavors are not allowed; all other colors (yellow, green, orange, purple and blue) are okay. It is also okay to buy two 2.12 oz packets of powdered Gatorade  that can be mixed with water to a total volume of 64 oz of liquid.  - 1 (one) 10 oz bottle of Magnesium Citrate (Red colored flavors are not allowed)  It is also okay for you to use a 0.5 oz package of powdered magnesium citrate (17 g) mixed with 10 oz of water.      PREPARATION FOR COLONOSCOPY    7 days before:    Discontinue fiber supplements and medications containing iron. This includes Metamucil  and Fibercon ; and multivitamins with iron.    3 days before:    Begin a low-fiber diet. A low-fiber diet helps making the cleanout more effective.     Examples of a low-fiber diet include (but are not limited to): white bread, white rice, pasta, crackers, fish, chicken, eggs, ground beef, creamy peanut butter, cooked/steamed/boiled vegetables, canned fruit, bananas, melons, milk, plain yogurt cheese, salad dressing and other condiments.     The following are not allowed on a low-fiber diet: seeds, nuts, popcorn, bran, whole wheat, corn, quinoa, raw fruits and vegetables, berries and dried fruit, beans and lentils.    For additional details on low-fiber diet, please refer to the table on the last page.    2 days before:    Continue the low-fiber diet.     Drink at least 8 glasses of water throughout the day.     Stop eating solid foods at 11:45 pm.    1 day before:    In the morning: begin a clear liquid diet (liquids you can see through).     Examples of a clear liquid diet include: water, clear broth or bouillon, Gatorade, Pedialyte or Powerade, carbonated and non-carbonated soft drinks (Sprite , 7-Up , ginger ale), strained fruit juices without pulp (apple, white grape, white cranberry), Jell-O  and popsicles.     The following are not allowed on a clear liquid diet: red liquids, alcoholic beverages, dairy products (milk, creamer, and yogurt), protein shakes, creamy broths, juice with pulp and chewing tobacco.    At noon: take 2 (two) bisacodyl tablets     At 4 (and no later than 6pm): start drinking the  Miralax-Gatorade preparation (8.3 oz of Miralax mixed with 64 oz of Gatorade in a large pitcher). Drink 1(one) 8 oz glass every 15 minutes thereafter, until the mixture is gone.    COLON CLEANSING TIPS: drink adequate amounts of fluids before and after your colon cleansing to prevent dehydration. Stay near a toilet because you will have diarrhea. Even if you are sitting on the toilet, continue to drink the cleansing solution every 15 minutes. If you feel nauseous or vomit, rinse your mouth with water, take a 15 to 30-minute-break and then continue drinking the solution. You will be uncomfortable until the stool has flushed from your colon (in about 2 to 4 hours). You may feel chilled.    Day of your procedure  You may take all of your morning medications including blood pressure medications, blood thinners (if you have not been instructed to stop these by our office), methadone, anti-seizure medications with sips of water 3 hours prior to your procedure or earlier. Do not take insulin or vitamins prior to your procedure. Continue the clear liquid diet.       4 hours prior: drink 10 oz of magnesium citrate. It may be easier to drink it with a straw.    STOP consuming all liquids after that.     Do not take anything by mouth during this time.     Allow extra time to travel to your procedure as you may need to stop and use a restroom along the way.    You are ready for the procedure, if you followed all instructions and your stool is no longer formed, but clear or yellow liquid. If you are unsure whether your colon is clean, please call our office at [phone] before you leave for your appointment.    Bring the following to your procedure:  - Insurance Card/Photo ID.   - List of current medications including over-the-counter medications and supplements.   - Your rescue inhaler if you currently use one to control asthma.    Canceling or rescheduling your appointment:   If you must cancel or reschedule your  appointment, please call [phone] as soon as possible.      COLONOSCOPY PRE-PROCEDURE CHECKLIST    If you have diabetes, ask your regular doctor for diet and medication restrictions.  If you take an anticoagulant or anti-platelet medication (such as Coumadin , Lovenox , Pradaxa , Xarelto , Eliquis , etc.), please call your primary doctor for advice on holding this medication.  If you take aspirin you may continue to do so.  If you are or may be pregnant, please discuss the risks and benefits of this procedure with your doctor.        What happens during a colonoscopy?    Plan to spend up to two hours, starting at registration time, at the endoscopy center the day of your procedure. The colonoscopy takes an average of 15 to 30 minutes. Recovery time is about 30 minutes.      Before the exam:    You will change into a gown.    Your medical history and medication list will be reviewed with you, unless that has been done over the phone prior to the procedure.     A nurse will insert an intravenous (IV) line into your hand or arm.    The doctor will meet with you and will give you a consent form to sign.  During the exam:     Medicine will be given through the IV line to help you relax.     Your heart rate and oxygen levels will be monitored. If your blood pressure is low, you may be given fluids through the IV line.     The doctor will insert a flexible hollow tube, called a colonoscope, into your rectum. The scope will be advanced slowly through the large intestine (colon).    You may have a feeling of fullness or pressure.     If an abnormal tissue or a polyp is found, the doctor may remove it through the endoscope for closer examination, or biopsy. Tissue removal is painless    After the exam:           Any tissue samples removed during the exam will be sent to a lab for evaluation. It may take 5-7 working days for you to be notified of the results.     A nurse will provide you with complete discharge  instructions before you leave the endoscopy center. Be sure to ask the nurse for specific instructions if you take blood thinners such as Aspirin, Coumadin or Plavix.     The doctor will prepare a full report for you and for the physician who referred you for the procedure.     Your doctor will talk with you about the initial results of your exam.      Medication given during the exam will prohibit you from driving for the rest of the day.     Following the exam, you may resume your normal diet. Your first meal should be light, no greasy foods. Avoid alcohol until the next day.     You may resume your regular activities the day after the procedure.         LOW-FIBER DIET    Foods RECOMMENDED Foods to AVOID   Breads, Cereal, Rice and Pasta:   White bread, rolls, biscuits, croissant and zhane toast.   Waffles, Macedonian toast and pancakes.   White rice, noodles, pasta, macaroni and peeled cooked potatoes.   Plain crackers and saltines.   Cooked cereals: farina, cream of rice.   Cold cereals: Puffed Rice , Rice Krispies , Corn Flakes  and Special K    Breads, Cereal, Rice and Pasta:   Breads or rolls with nuts, seeds or fruit.   Whole wheat, pumpernickel, rye breads and cornbread.   Potatoes with skin, brown or wild rice, and kasha (buckwheat).     Vegetables:   Tender cooked and canned vegetables without seeds: carrots, asparagus tips, green or wax beans, pumpkin, spinach, lima beans. Vegetables:   Raw or steamed vegetables.   Vegetables with seeds.   Sauerkraut.   Winter squash, peas, broccoli, Brussel sprouts, cabbage, onions, cauliflower, baked beans, peas and corn.   Fruits:   Strained fruit juice.   Canned fruit, except pineapple.   Ripe bananas and melon. Fruits:   Prunes and prune juice.   Raw fruits.   Dried fruits: figs, dates and raisins.   Milk/Dairy:   Milk: plain or flavored.   Yogurt, custard and ice cream.   Cheese and cottage cheese Milk/Dairy:     Meat and other proteins:   ground, well-cooked tender  beef, lamb, ham, veal, pork, fish, poultry and organ meats.   Eggs.   Peanut butter without nuts. Meat and other proteins:   Tough, fibrous meats with gristle.   Dry beans, peas and lentils.   Peanut butter with nuts.   Tofu.   Fats, Snack, Sweets, Condiments and Beverages:   Margarine, butter, oils, mayonnaise, sour cream and salad dressing, plain gravy.   Sugar, hard candy, clear jelly, honey and syrup.   Spices, cooked herbs, bouillon, broth and soups made with allowed vegetable, ketchup and mustard.   Coffee, tea and carbonated drinks.   Plain cakes, cookies and pretzels.   Gelatin, plain puddings, custard, ice cream, sherbet and popsicles. Fats, Snack, Sweets, Condiments and Beverages:   Nuts, seeds and coconut.   Jam, marmalade and preserves.   Pickles, olives, relish and horseradish.   All desserts containing nuts, seeds, dried fruit and coconut; or made from whole grains or bran.   Candy made with nuts or seeds.   Popcorn.         DIRECTIONS TO THE ENDOSCOPY DEPARTMENT    From the north ([facility])  Take [address] South, exit on [address] [address]. Get into the left hand taya, turn left (east), go one-half mile to [address] and turn left. Go north to the second stoplight, take a right on [address] and follow it to the Main Hospital entrance.    From the south ([facility])  Take [address]N to the [address]E split and exit on [address] [address]. On [address] [address], turn left (west) to [address]. Turn right (north) on [address]. Go north to the second stoplight, take a right on [address] and follow it to the Main Hospital entrance.    From the east via 35E ([facility])  Take [address]E south to [address] [address] exit. Turn right on [address] [address]. Go west to [address]. Turn right (north) on [address]. Go to the second stoplight, take a right on [address] to the Main Hospital entrance.    From the east via [address] ([facility])  Take [facility] [address] to [address]. Turn left (south) on [address] to [address], turn left (east) on [address] and follow it to the Main Hospital entrance.    From the west via [address] ([place], [place])  Take [address] east to [address]. Turn right (south) on [address] to [address], turn left (east) on [address] and follow it to the Main Hospital entrance.

## 2020-11-05 LAB — COPATH REPORT: NORMAL

## 2020-11-08 ENCOUNTER — HEALTH MAINTENANCE LETTER (OUTPATIENT)
Age: 60
End: 2020-11-08

## 2021-06-28 ENCOUNTER — TELEPHONE (OUTPATIENT)
Dept: INTERNAL MEDICINE | Facility: CLINIC | Age: 61
End: 2021-06-28

## 2021-06-28 DIAGNOSIS — I48.91 ATRIAL FIBRILLATION, UNSPECIFIED TYPE (H): ICD-10-CM

## 2021-07-07 NOTE — TELEPHONE ENCOUNTER
Prescription was e-scribed to [facility] pharmacy.    Detailed message left on voice mail.

## 2021-07-07 NOTE — TELEPHONE ENCOUNTER
PT called and looking on status and would like sent to [facility]

## 2021-08-11 ENCOUNTER — OFFICE VISIT (OUTPATIENT)
Dept: INTERNAL MEDICINE | Facility: CLINIC | Age: 61
End: 2021-08-11
Payer: COMMERCIAL

## 2021-08-11 VITALS
HEIGHT: 76 IN | SYSTOLIC BLOOD PRESSURE: 96 MMHG | OXYGEN SATURATION: 99 % | HEART RATE: 45 BPM | BODY MASS INDEX: 26.55 KG/M2 | DIASTOLIC BLOOD PRESSURE: 60 MMHG | TEMPERATURE: 98.4 F | RESPIRATION RATE: 18 BRPM | WEIGHT: 218 LBS

## 2021-08-11 DIAGNOSIS — I42.8 NONISCHEMIC CARDIOMYOPATHY (H): ICD-10-CM

## 2021-08-11 DIAGNOSIS — Z00.00 ROUTINE GENERAL MEDICAL EXAMINATION AT A HEALTH CARE FACILITY: Primary | ICD-10-CM

## 2021-08-11 DIAGNOSIS — Z13.6 CARDIOVASCULAR SCREENING; LDL GOAL LESS THAN 160: ICD-10-CM

## 2021-08-11 DIAGNOSIS — Z12.5 SPECIAL SCREENING FOR MALIGNANT NEOPLASM OF PROSTATE: ICD-10-CM

## 2021-08-11 DIAGNOSIS — N52.01 ERECTILE DYSFUNCTION DUE TO ARTERIAL INSUFFICIENCY: ICD-10-CM

## 2021-08-11 DIAGNOSIS — G25.0 BENIGN ESSENTIAL TREMOR: ICD-10-CM

## 2021-08-11 DIAGNOSIS — Z79.899 MEDICATION MANAGEMENT: ICD-10-CM

## 2021-08-11 DIAGNOSIS — I48.91 ATRIAL FIBRILLATION, UNSPECIFIED TYPE (H): ICD-10-CM

## 2021-08-11 LAB
ALBUMIN SERPL-MCNC: 3.6 G/DL (ref 3.4–5)
ALP SERPL-CCNC: 75 U/L (ref 40–150)
ALT SERPL W P-5'-P-CCNC: 32 U/L (ref 0–70)
ANION GAP SERPL CALCULATED.3IONS-SCNC: 2 MMOL/L (ref 3–14)
AST SERPL W P-5'-P-CCNC: 24 U/L (ref 0–45)
BILIRUB SERPL-MCNC: 0.7 MG/DL (ref 0.2–1.3)
BUN SERPL-MCNC: 19 MG/DL (ref 7–30)
CALCIUM SERPL-MCNC: 9 MG/DL (ref 8.5–10.1)
CHLORIDE BLD-SCNC: 109 MMOL/L (ref 94–109)
CHOLEST SERPL-MCNC: 180 MG/DL
CO2 SERPL-SCNC: 30 MMOL/L (ref 20–32)
CREAT SERPL-MCNC: 1.1 MG/DL (ref 0.66–1.25)
ERYTHROCYTE [DISTWIDTH] IN BLOOD BY AUTOMATED COUNT: 12.3 % (ref 10–15)
FASTING STATUS PATIENT QL REPORTED: YES
GFR SERPL CREATININE-BSD FRML MDRD: 73 ML/MIN/1.73M2
GLUCOSE BLD-MCNC: 107 MG/DL (ref 70–99)
HCT VFR BLD AUTO: 46.1 % (ref 40–53)
HDLC SERPL-MCNC: 59 MG/DL
HGB BLD-MCNC: 15.1 G/DL (ref 13.3–17.7)
LDLC SERPL CALC-MCNC: 108 MG/DL
MCH RBC QN AUTO: 30.9 PG (ref 26.5–33)
MCHC RBC AUTO-ENTMCNC: 32.8 G/DL (ref 31.5–36.5)
MCV RBC AUTO: 94 FL (ref 78–100)
NONHDLC SERPL-MCNC: 121 MG/DL
PLATELET # BLD AUTO: 257 10E3/UL (ref 150–450)
POTASSIUM BLD-SCNC: 4.4 MMOL/L (ref 3.4–5.3)
PROT SERPL-MCNC: 6.9 G/DL (ref 6.8–8.8)
PSA SERPL-MCNC: 0.71 UG/L (ref 0–4)
RBC # BLD AUTO: 4.89 10E6/UL (ref 4.4–5.9)
SODIUM SERPL-SCNC: 141 MMOL/L (ref 133–144)
TRIGL SERPL-MCNC: 63 MG/DL
TSH SERPL DL<=0.005 MIU/L-ACNC: 1.36 MU/L (ref 0.4–4)
WBC # BLD AUTO: 5.2 10E3/UL (ref 4–11)

## 2021-08-11 PROCEDURE — G0103 PSA SCREENING: HCPCS | Performed by: INTERNAL MEDICINE

## 2021-08-11 PROCEDURE — 80061 LIPID PANEL: CPT | Performed by: INTERNAL MEDICINE

## 2021-08-11 PROCEDURE — 99213 OFFICE O/P EST LOW 20 MIN: CPT | Mod: 25 | Performed by: INTERNAL MEDICINE

## 2021-08-11 PROCEDURE — 85027 COMPLETE CBC AUTOMATED: CPT | Performed by: INTERNAL MEDICINE

## 2021-08-11 PROCEDURE — 84443 ASSAY THYROID STIM HORMONE: CPT | Performed by: INTERNAL MEDICINE

## 2021-08-11 PROCEDURE — 80053 COMPREHEN METABOLIC PANEL: CPT | Performed by: INTERNAL MEDICINE

## 2021-08-11 PROCEDURE — 99396 PREV VISIT EST AGE 40-64: CPT | Performed by: INTERNAL MEDICINE

## 2021-08-11 PROCEDURE — 36415 COLL VENOUS BLD VENIPUNCTURE: CPT | Performed by: INTERNAL MEDICINE

## 2021-08-11 RX ORDER — LISINOPRIL 2.5 MG/1
2.5 TABLET ORAL DAILY
Qty: 90 TABLET | Refills: 3 | Status: SHIPPED | OUTPATIENT
Start: 2021-08-11 | End: 2021-10-28

## 2021-08-11 RX ORDER — PROPRANOLOL HYDROCHLORIDE 20 MG/1
TABLET ORAL
Qty: 180 TABLET | Refills: 3 | Status: SHIPPED | OUTPATIENT
Start: 2021-08-11 | End: 2021-10-28

## 2021-08-11 RX ORDER — TADALAFIL 20 MG/1
TABLET ORAL
Qty: 6 TABLET | Refills: 11 | Status: SHIPPED | OUTPATIENT
Start: 2021-08-11 | End: 2021-10-28

## 2021-08-11 SDOH — ECONOMIC STABILITY: INCOME INSECURITY: IN THE LAST 12 MONTHS, WAS THERE A TIME WHEN YOU WERE NOT ABLE TO PAY THE MORTGAGE OR RENT ON TIME?: NO

## 2021-08-11 SDOH — HEALTH STABILITY: PHYSICAL HEALTH: ON AVERAGE, HOW MANY MINUTES DO YOU ENGAGE IN EXERCISE AT THIS LEVEL?: 60 MIN

## 2021-08-11 SDOH — HEALTH STABILITY: PHYSICAL HEALTH: ON AVERAGE, HOW MANY DAYS PER WEEK DO YOU ENGAGE IN MODERATE TO STRENUOUS EXERCISE (LIKE A BRISK WALK)?: 7 DAYS

## 2021-08-11 SDOH — ECONOMIC STABILITY: FOOD INSECURITY: WITHIN THE PAST 12 MONTHS, YOU WORRIED THAT YOUR FOOD WOULD RUN OUT BEFORE YOU GOT MONEY TO BUY MORE.: NEVER TRUE

## 2021-08-11 SDOH — ECONOMIC STABILITY: FOOD INSECURITY: WITHIN THE PAST 12 MONTHS, THE FOOD YOU BOUGHT JUST DIDN'T LAST AND YOU DIDN'T HAVE MONEY TO GET MORE.: NEVER TRUE

## 2021-08-11 ASSESSMENT — ENCOUNTER SYMPTOMS
DIARRHEA: 0
HEADACHES: 0
HEMATURIA: 0
SORE THROAT: 0
ARTHRALGIAS: 0
EYE PAIN: 0
HEARTBURN: 0
NAUSEA: 0
CONSTIPATION: 0
DIZZINESS: 0
NERVOUS/ANXIOUS: 0
PARESTHESIAS: 0
SHORTNESS OF BREATH: 0
COUGH: 0
ABDOMINAL PAIN: 0
JOINT SWELLING: 0
FEVER: 0
DYSURIA: 0
HEMATOCHEZIA: 0
FREQUENCY: 0
PALPITATIONS: 0
WEAKNESS: 0
CHILLS: 0
MYALGIAS: 0

## 2021-08-11 ASSESSMENT — SOCIAL DETERMINANTS OF HEALTH (SDOH)
WITHIN THE LAST YEAR, HAVE YOU BEEN HUMILIATED OR EMOTIONALLY ABUSED IN OTHER WAYS BY YOUR PARTNER OR EX-PARTNER?: NO
WITHIN THE LAST YEAR, HAVE YOU BEEN KICKED, HIT, SLAPPED, OR OTHERWISE PHYSICALLY HURT BY YOUR PARTNER OR EX-PARTNER?: NO
WITHIN THE LAST YEAR, HAVE YOU BEEN AFRAID OF YOUR PARTNER OR EX-PARTNER?: NO
WITHIN THE LAST YEAR, HAVE TO BEEN RAPED OR FORCED TO HAVE ANY KIND OF SEXUAL ACTIVITY BY YOUR PARTNER OR EX-PARTNER?: NO
HOW HARD IS IT FOR YOU TO PAY FOR THE VERY BASICS LIKE FOOD, HOUSING, MEDICAL CARE, AND HEATING?: NOT VERY HARD

## 2021-08-11 ASSESSMENT — LIFESTYLE VARIABLES
HOW OFTEN DO YOU HAVE A DRINK CONTAINING ALCOHOL: 2-3 TIMES A WEEK
HOW OFTEN DO YOU HAVE SIX OR MORE DRINKS ON ONE OCCASION: NEVER
HOW MANY STANDARD DRINKS CONTAINING ALCOHOL DO YOU HAVE ON A TYPICAL DAY: 1 OR 2

## 2021-08-11 ASSESSMENT — MIFFLIN-ST. JEOR: SCORE: 1900.34

## 2021-08-11 NOTE — PATIENT INSTRUCTIONS
Everything looks fine!    Refills of medication have been provided.     Lab results will be available soon on [facility].    See me in a year, sooner if problems.

## 2021-08-11 NOTE — PROGRESS NOTES
SUBJECTIVE:   CC: John Patel is an 61 year old male who presents for preventative health visit.       Patient has been advised of split billing requirements and indicates understanding: Yes  Healthy Habits:     Getting at least 3 servings of Calcium per day:  Yes    Bi-annual eye exam:  Yes    Dental care twice a year:  Yes    Sleep apnea or symptoms of sleep apnea:  None    Diet:  Regular (no restrictions)    Frequency of exercise:  6-7 days/week    Duration of exercise:  45-60 minutes    Taking medications regularly:  Yes    Medication side effects:  None    PHQ-2 Total Score: 0    Additional concerns today:  No        PROBLEMS TO ADD ON...In addition to a preventive exam, we addressed atrial fibrillation, cardiomyopathy, erectile dysfunction, and tremor.    The patient is tolerating his current medications without any adverse effects.    His last office visit with cardiology was in May of 2020 with [facility].   They document that atrial fibrillation was noted incidentally during a routine exam in the fall of 2015, with an echo showing left ventricular ejection fraction of around 40 percent. His EF declined further over the next two years to 30-35 percent, and he underwent complete RF ablation for afib and flutter, with resultant resolution of atrial fibrillation and a gradual return of his EF to normal.     He is provided refill Rx's today for Cialis for erectile dysfunction, and propranolol for tremor. He finds that these are working satisfactorily.      Today's PHQ-2 Score:   PHQ-2 ( 1999 [employer]izer) 8/11/2021   Q1: Little interest or pleasure in doing things 0   Q2: Feeling down, depressed or hopeless 0   PHQ-2 Score 0   Q1: Little interest or pleasure in doing things Not at all   Q2: Feeling down, depressed or hopeless Not at all   PHQ-2 Score 0       Abuse: Current or Past(Physical, Sexual or Emotional)- No  Do you feel safe in your environment? Yes        Social History     Tobacco Use      "Smoking status: Never Smoker     Smokeless tobacco: Never Used   Substance Use Topics     Alcohol use: Yes     Alcohol/week: 0.0 standard drinks     Comment: 1 drink weekly         Alcohol Use 8/11/2021   Prescreen: >3 drinks/day or >7 drinks/week? No   Prescreen: >3 drinks/day or >7 drinks/week? -       Last PSA:   PSA   Date Value Ref Range Status   08/07/2020 0.72 0 - 4 ug/L Final     Comment:     Assay Method:  Chemiluminescence using Siemens Vista analyzer       Reviewed orders with patient. Reviewed health maintenance and updated orders accordingly - Yes    Reviewed and updated as needed this visit by clinical staff                 Reviewed and updated as needed this visit by Provider                  Review of Systems   Constitutional: Negative for chills and fever.   HENT: Negative for congestion, ear pain, hearing loss and sore throat.    Eyes: Negative for pain and visual disturbance.   Respiratory: Negative for cough and shortness of breath.    Cardiovascular: Negative for chest pain, palpitations and peripheral edema.   Gastrointestinal: Negative for abdominal pain, constipation, diarrhea, heartburn, hematochezia and nausea.   Genitourinary: Positive for impotence. Negative for discharge, dysuria, frequency, genital sores, hematuria and urgency.   Musculoskeletal: Negative for arthralgias, joint swelling and myalgias.   Skin: Negative for rash.   Neurological: Negative for dizziness, weakness, headaches and paresthesias.   Psychiatric/Behavioral: Negative for mood changes. The patient is not nervous/anxious.        OBJECTIVE:   Vitals: BP 96/60   Pulse (!) 45   Temp 98.4  F (36.9  C) (Oral)   Resp 18   Ht 1.93 m (6' 4\")   Wt 98.9 kg (218 lb)   SpO2 99%   BMI 26.54 kg/m    BMI= Body mass index is 26.54 kg/m .     Physical Exam  GENERAL: healthy, alert and no distress  EYES: Eyes grossly normal to inspection, PERRL and conjunctivae and sclerae normal  HENT: ear canals and TM's normal, nose and mouth " without ulcers or lesions  NECK: no adenopathy, no asymmetry, masses, or scars and thyroid normal to palpation  RESP: lungs clear to auscultation - no rales, rhonchi or wheezes  CV: regular rate and rhythm, normal S1 S2, no S3 or S4, no murmur, click or rub, no peripheral edema and peripheral pulses strong  ABDOMEN: soft, nontender, no hepatosplenomegaly, no masses and bowel sounds normal  MS: no gross musculoskeletal defects noted, no edema  SKIN: no suspicious lesions or rashes  NEURO: Normal strength and tone, mentation intact and speech normal  PSYCH: mentation appears normal, affect normal/bright    Diagnostic Test Results:  Labs reviewed in Epic    ASSESSMENT/PLAN:   (Z00.00) Routine general medical examination at a health care facility  (primary encounter diagnosis)  Comment: Stable health. See epic orders.     (I48.91) Atrial fibrillation, unspecified type (H)  Comment: Continue apixaban.   Plan: apixaban ANTICOAGULANT (ELIQUIS ANTICOAGULANT)         5 MG tablet, OFFICE/OUTPT VISIT,EST,LEVL III          (I42.8) Nonischemic cardiomyopathy (H)  Comment: LVEF has returned into normal range. Continue current meds.   Plan: lisinopril (ZESTRIL) 2.5 MG tablet,         OFFICE/OUTPT VISIT,EST,LEVL III          (N52.01) Erectile dysfunction due to arterial insufficiency  Comment: Refilled tadalafil.   Plan: tadalafil (CIALIS) 20 MG tablet, OFFICE/OUTPT         VISIT,EST,LEVL III          (G25.0) Benign essential tremor  Comment: Refilled propranolol.   Plan: **TSH with free T4 reflex FUTURE 2mo,         propranolol (INDERAL) 20 MG tablet,         OFFICE/OUTPT VISIT,EST,LEVL III          (Z13.6) CARDIOVASCULAR SCREENING; LDL GOAL LESS THAN 160  Plan: **Comprehensive metabolic panel FUTURE 2mo,         Lipid panel reflex to direct LDL Fasting          (Z12.5) Special screening for malignant neoplasm of prostate  Plan: PSA, screen          (Z79.899) Medication management  Plan: **CBC with platelets FUTURE 2mo, **TSH with  "        free T4 reflex FUTURE 2mo           Patient has been advised of split billing requirements and indicates understanding: Yes  COUNSELING:   Reviewed preventive health counseling, as reflected in patient instructions    Estimated body mass index is 26.17 kg/m  as calculated from the following:    Height as of 11/4/20: 1.93 m (6' 4\").    Weight as of 11/4/20: 97.5 kg (215 lb).         He reports that he has never smoked. He has never used smokeless tobacco.      Counseling Resources:  ATP IV Guidelines  Pooled Cohorts Equation Calculator  FRAX Risk Assessment  ICSI Preventive Guidelines  Dietary Guidelines for Americans, 2010  [facility]'s MyPlate  ASA Prophylaxis  Lung CA Screening    Marc Mahmood MD,   [facility]    Patient Instructions   Everything looks fine!    Refills of medication have been provided.     Lab results will be available soon on [facility].    See me in a year, sooner if problems.       "

## 2021-08-11 NOTE — NURSING NOTE
"Chief Complaint   Patient presents with     Physical     fasting     initial BP 96/60   Pulse (!) 45   Temp 98.4  F (36.9  C) (Oral)   Resp 18   Ht 1.93 m (6' 4\")   Wt 98.9 kg (218 lb)   SpO2 99%   BMI 26.54 kg/m   Estimated body mass index is 26.54 kg/m  as calculated from the following:    Height as of this encounter: 1.93 m (6' 4\").    Weight as of this encounter: 98.9 kg (218 lb)..  bp completed using cuff size large  LINDA GAYTAN LPN  "

## 2021-09-11 ENCOUNTER — HEALTH MAINTENANCE LETTER (OUTPATIENT)
Age: 61
End: 2021-09-11

## 2021-10-26 ENCOUNTER — MYC MEDICAL ADVICE (OUTPATIENT)
Dept: INTERNAL MEDICINE | Facility: CLINIC | Age: 61
End: 2021-10-26

## 2021-10-26 DIAGNOSIS — I48.91 ATRIAL FIBRILLATION, UNSPECIFIED TYPE (H): ICD-10-CM

## 2021-10-26 DIAGNOSIS — N52.01 ERECTILE DYSFUNCTION DUE TO ARTERIAL INSUFFICIENCY: ICD-10-CM

## 2021-10-26 DIAGNOSIS — G25.0 BENIGN ESSENTIAL TREMOR: ICD-10-CM

## 2021-10-26 DIAGNOSIS — I42.8 NONISCHEMIC CARDIOMYOPATHY (H): ICD-10-CM

## 2021-10-28 RX ORDER — PROPRANOLOL HYDROCHLORIDE 20 MG/1
TABLET ORAL
Qty: 180 TABLET | Refills: 2 | Status: SHIPPED | OUTPATIENT
Start: 2021-10-28 | End: 2022-08-16

## 2021-10-28 RX ORDER — LISINOPRIL 2.5 MG/1
2.5 TABLET ORAL DAILY
Qty: 90 TABLET | Refills: 2 | Status: SHIPPED | OUTPATIENT
Start: 2021-10-28 | End: 2022-06-22

## 2021-10-28 RX ORDER — TADALAFIL 20 MG/1
TABLET ORAL
Qty: 6 TABLET | Refills: 8 | Status: SHIPPED | OUTPATIENT
Start: 2021-10-28 | End: 2022-08-16

## 2021-10-28 NOTE — TELEPHONE ENCOUNTER
Pharmacy change - patient no longer using [facility], needs prescriptions sent to [facility] for Eliquis, lisinopril, propranolol and tadalafil. Prescriptions last ordered 8/11/21 by Dr. Mahmood for 90 day supply and 3 refills. Prescriptions resent to [facility] for patient.     Cindy Leggett RN  [facility]

## 2022-08-12 ASSESSMENT — ENCOUNTER SYMPTOMS
ABDOMINAL PAIN: 0
CONSTIPATION: 0
NAUSEA: 0
ARTHRALGIAS: 0
DIARRHEA: 0
MYALGIAS: 0
FREQUENCY: 0
PARESTHESIAS: 0
CHILLS: 0
NERVOUS/ANXIOUS: 0
FEVER: 0
PALPITATIONS: 0
HEADACHES: 0
HEARTBURN: 0
HEMATURIA: 0
JOINT SWELLING: 0
HEMATOCHEZIA: 0
WEAKNESS: 0
COUGH: 0
DIZZINESS: 0
DYSURIA: 0
SHORTNESS OF BREATH: 0
EYE PAIN: 0
SORE THROAT: 0

## 2022-08-16 ENCOUNTER — OFFICE VISIT (OUTPATIENT)
Dept: INTERNAL MEDICINE | Facility: CLINIC | Age: 62
End: 2022-08-16
Payer: COMMERCIAL

## 2022-08-16 VITALS
HEIGHT: 77 IN | TEMPERATURE: 97.4 F | OXYGEN SATURATION: 100 % | BODY MASS INDEX: 25.5 KG/M2 | SYSTOLIC BLOOD PRESSURE: 110 MMHG | HEART RATE: 58 BPM | DIASTOLIC BLOOD PRESSURE: 68 MMHG | RESPIRATION RATE: 18 BRPM | WEIGHT: 216 LBS

## 2022-08-16 DIAGNOSIS — N52.01 ERECTILE DYSFUNCTION DUE TO ARTERIAL INSUFFICIENCY: ICD-10-CM

## 2022-08-16 DIAGNOSIS — Z13.6 CARDIOVASCULAR SCREENING; LDL GOAL LESS THAN 160: ICD-10-CM

## 2022-08-16 DIAGNOSIS — I42.8 NONISCHEMIC CARDIOMYOPATHY (H): ICD-10-CM

## 2022-08-16 DIAGNOSIS — Z00.00 ROUTINE GENERAL MEDICAL EXAMINATION AT A HEALTH CARE FACILITY: Primary | ICD-10-CM

## 2022-08-16 DIAGNOSIS — G25.0 BENIGN ESSENTIAL TREMOR: ICD-10-CM

## 2022-08-16 DIAGNOSIS — Z79.899 MEDICATION MANAGEMENT: ICD-10-CM

## 2022-08-16 DIAGNOSIS — I48.91 ATRIAL FIBRILLATION, UNSPECIFIED TYPE (H): ICD-10-CM

## 2022-08-16 DIAGNOSIS — Z12.5 SPECIAL SCREENING FOR MALIGNANT NEOPLASM OF PROSTATE: ICD-10-CM

## 2022-08-16 LAB
ERYTHROCYTE [DISTWIDTH] IN BLOOD BY AUTOMATED COUNT: 12 % (ref 10–15)
HCT VFR BLD AUTO: 44.8 % (ref 40–53)
HGB BLD-MCNC: 15 G/DL (ref 13.3–17.7)
MCH RBC QN AUTO: 31.1 PG (ref 26.5–33)
MCHC RBC AUTO-ENTMCNC: 33.5 G/DL (ref 31.5–36.5)
MCV RBC AUTO: 93 FL (ref 78–100)
PLATELET # BLD AUTO: 244 10E3/UL (ref 150–450)
RBC # BLD AUTO: 4.82 10E6/UL (ref 4.4–5.9)
WBC # BLD AUTO: 4.2 10E3/UL (ref 4–11)

## 2022-08-16 PROCEDURE — 85027 COMPLETE CBC AUTOMATED: CPT | Performed by: INTERNAL MEDICINE

## 2022-08-16 PROCEDURE — 36415 COLL VENOUS BLD VENIPUNCTURE: CPT | Performed by: INTERNAL MEDICINE

## 2022-08-16 PROCEDURE — 80053 COMPREHEN METABOLIC PANEL: CPT | Performed by: INTERNAL MEDICINE

## 2022-08-16 PROCEDURE — 80061 LIPID PANEL: CPT | Performed by: INTERNAL MEDICINE

## 2022-08-16 PROCEDURE — 99214 OFFICE O/P EST MOD 30 MIN: CPT | Mod: 25 | Performed by: INTERNAL MEDICINE

## 2022-08-16 PROCEDURE — G0103 PSA SCREENING: HCPCS | Performed by: INTERNAL MEDICINE

## 2022-08-16 PROCEDURE — 99396 PREV VISIT EST AGE 40-64: CPT | Performed by: INTERNAL MEDICINE

## 2022-08-16 PROCEDURE — 84443 ASSAY THYROID STIM HORMONE: CPT | Performed by: INTERNAL MEDICINE

## 2022-08-16 RX ORDER — PROPRANOLOL HYDROCHLORIDE 20 MG/1
TABLET ORAL
Qty: 180 TABLET | Refills: 3 | Status: SHIPPED | OUTPATIENT
Start: 2022-08-16 | End: 2023-08-18

## 2022-08-16 RX ORDER — LISINOPRIL 2.5 MG/1
2.5 TABLET ORAL DAILY
Qty: 90 TABLET | Refills: 3 | Status: SHIPPED | OUTPATIENT
Start: 2022-08-16 | End: 2023-01-16

## 2022-08-16 RX ORDER — TADALAFIL 20 MG/1
TABLET ORAL
Qty: 6 TABLET | Refills: 8 | Status: SHIPPED | OUTPATIENT
Start: 2022-08-16

## 2022-08-16 SDOH — HEALTH STABILITY: PHYSICAL HEALTH: ON AVERAGE, HOW MANY DAYS PER WEEK DO YOU ENGAGE IN MODERATE TO STRENUOUS EXERCISE (LIKE A BRISK WALK)?: 7 DAYS

## 2022-08-16 SDOH — ECONOMIC STABILITY: INCOME INSECURITY: IN THE LAST 12 MONTHS, WAS THERE A TIME WHEN YOU WERE NOT ABLE TO PAY THE MORTGAGE OR RENT ON TIME?: NO

## 2022-08-16 SDOH — HEALTH STABILITY: PHYSICAL HEALTH: ON AVERAGE, HOW MANY MINUTES DO YOU ENGAGE IN EXERCISE AT THIS LEVEL?: 40 MIN

## 2022-08-16 SDOH — HEALTH STABILITY: PHYSICAL HEALTH: ON AVERAGE, HOW MANY MINUTES DO YOU ENGAGE IN EXERCISE AT THIS LEVEL?: 20 MIN

## 2022-08-16 SDOH — ECONOMIC STABILITY: FOOD INSECURITY: WITHIN THE PAST 12 MONTHS, THE FOOD YOU BOUGHT JUST DIDN'T LAST AND YOU DIDN'T HAVE MONEY TO GET MORE.: NEVER TRUE

## 2022-08-16 SDOH — ECONOMIC STABILITY: FOOD INSECURITY: WITHIN THE PAST 12 MONTHS, YOU WORRIED THAT YOUR FOOD WOULD RUN OUT BEFORE YOU GOT MONEY TO BUY MORE.: NEVER TRUE

## 2022-08-16 ASSESSMENT — ENCOUNTER SYMPTOMS
ARTHRALGIAS: 0
HEARTBURN: 0
DYSURIA: 0
HEADACHES: 0
PARESTHESIAS: 0
PALPITATIONS: 0
NAUSEA: 0
FEVER: 0
WEAKNESS: 0
CHILLS: 0
SORE THROAT: 0
JOINT SWELLING: 0
HEMATOCHEZIA: 0
NERVOUS/ANXIOUS: 0
COUGH: 0
CONSTIPATION: 0
DIARRHEA: 0
SHORTNESS OF BREATH: 0
EYE PAIN: 0
HEMATURIA: 0
ABDOMINAL PAIN: 0
FREQUENCY: 0
DIZZINESS: 0
MYALGIAS: 0

## 2022-08-16 ASSESSMENT — LIFESTYLE VARIABLES
SKIP TO QUESTIONS 9-10: 1
HOW MANY STANDARD DRINKS CONTAINING ALCOHOL DO YOU HAVE ON A TYPICAL DAY: 1 OR 2
AUDIT-C TOTAL SCORE: 3
HOW OFTEN DO YOU HAVE A DRINK CONTAINING ALCOHOL: 2-3 TIMES A WEEK
HOW OFTEN DO YOU HAVE SIX OR MORE DRINKS ON ONE OCCASION: NEVER

## 2022-08-16 ASSESSMENT — SOCIAL DETERMINANTS OF HEALTH (SDOH)
WITHIN THE LAST YEAR, HAVE YOU BEEN KICKED, HIT, SLAPPED, OR OTHERWISE PHYSICALLY HURT BY YOUR PARTNER OR EX-PARTNER?: NO
WITHIN THE LAST YEAR, HAVE YOU BEEN HUMILIATED OR EMOTIONALLY ABUSED IN OTHER WAYS BY YOUR PARTNER OR EX-PARTNER?: NO
HOW HARD IS IT FOR YOU TO PAY FOR THE VERY BASICS LIKE FOOD, HOUSING, MEDICAL CARE, AND HEATING?: NOT HARD AT ALL
WITHIN THE LAST YEAR, HAVE TO BEEN RAPED OR FORCED TO HAVE ANY KIND OF SEXUAL ACTIVITY BY YOUR PARTNER OR EX-PARTNER?: NO
WITHIN THE LAST YEAR, HAVE YOU BEEN AFRAID OF YOUR PARTNER OR EX-PARTNER?: NO

## 2022-08-16 NOTE — PROGRESS NOTES
SUBJECTIVE:   CC: John Patel is an 62 year old male who presents for preventative health visit.       Patient has been advised of split billing requirements and indicates understanding: Yes  Healthy Habits:     Getting at least 3 servings of Calcium per day:  Yes    Bi-annual eye exam:  NO    Dental care twice a year:  Yes    Sleep apnea or symptoms of sleep apnea:  None    Diet:  Regular (no restrictions)    Frequency of exercise:  6-7 days/week    Duration of exercise:  45-60 minutes    Taking medications regularly:  Yes    Medication side effects:  None    PHQ-2 Total Score: 0      PROBLEMS TO ADD ON...In addition to a preventive exam, we addressed previous atrial fibrillation and nonischemic cardiomyopathy, benign essential tremor, erectile dysfunction.     The patient sees cardiology once a year and has an appointment scheduled for 11/2. He has had an ablation without subsequent atrial fibrillation episodes but has decided with his cardiologist to remain on Eliquis. He takes lisinopril for nonischemic cardiomyopathy. No symptoms of chest pain, dyspnea, palpitations or dizziness.     He has found some benefit from propranolol at dosing of 20 mg twice daily. He didn' like side effects of 40 mg BID. He knows that he could take an extra pill prn if/when he needs to present in front of a group again. He retired in June 2021.     He has previously found Cialis to be helpful for erectile dysfunction. He and his wife are not sexually active as often but accepts a refill Rx in case it is needed.     He has had some recent itching on his left forearm, and some cracking of skin on his feet after lake swimming. Foot symptoms seem to have improved. He likely will see dermatology again in the coming year for refills of a cream that he has used on his forehead, cheeks and arms.           Today's PHQ-2 Score:   PHQ-2 ( 1999 Pfizer) 8/12/2022   Q1: Little interest or pleasure in doing things 0   Q2: Feeling down,  depressed or hopeless 0   PHQ-2 Score 0   PHQ-2 Total Score (12-17 Years)- Positive if 3 or more points; Administer PHQ-A if positive -   Q1: Little interest or pleasure in doing things Not at all   Q2: Feeling down, depressed or hopeless Not at all   PHQ-2 Score 0       Abuse: Current or Past(Physical, Sexual or Emotional)- No  Do you feel safe in your environment? Yes          Social History     Tobacco Use     Smoking status: Never Smoker     Smokeless tobacco: Never Used   Substance Use Topics     Alcohol use: Yes     Alcohol/week: 0.0 standard drinks     Comment: 1 drink weekly     If you drink alcohol do you typically have >3 drinks per day or >7 drinks per week? No    Alcohol Use 8/12/2022   Prescreen: >3 drinks/day or >7 drinks/week? No   Prescreen: >3 drinks/day or >7 drinks/week? -       Last PSA:   PSA   Date Value Ref Range Status   08/07/2020 0.72 0 - 4 ug/L Final     Comment:     Assay Method:  Chemiluminescence using Siemens Vista analyzer     Prostate Specific Antigen Screen   Date Value Ref Range Status   08/11/2021 0.71 0.00 - 4.00 ug/L Final       Reviewed orders with patient. Reviewed health maintenance and updated orders accordingly - Yes    Reviewed and updated as needed this visit by clinical staff   Tobacco  Allergies  Meds   Med Hx  Surg Hx  Fam Hx  Soc Hx          Reviewed and updated as needed this visit by Provider                       Review of Systems   Constitutional: Negative for chills and fever.   HENT: Negative for congestion, ear pain, hearing loss and sore throat.    Eyes: Negative for pain and visual disturbance.   Respiratory: Negative for cough and shortness of breath.    Cardiovascular: Negative for chest pain, palpitations and peripheral edema.   Gastrointestinal: Negative for abdominal pain, constipation, diarrhea, heartburn, hematochezia and nausea.   Genitourinary: Positive for impotence. Negative for dysuria, frequency, genital sores, hematuria, penile discharge  "and urgency.   Musculoskeletal: Negative for arthralgias, joint swelling and myalgias.   Skin: Negative for rash.   Neurological: Negative for dizziness, weakness, headaches and paresthesias.   Psychiatric/Behavioral: Negative for mood changes. The patient is not nervous/anxious.        OBJECTIVE:   /68   Pulse 58   Temp 97.4  F (36.3  C) (Tympanic)   Resp 18   Ht 1.943 m (6' 4.5\")   Wt 98 kg (216 lb)   SpO2 100%   BMI 25.95 kg/m      Physical Exam  GENERAL: healthy, alert and no distress  EYES: Eyes grossly normal to inspection, PERRL and conjunctivae and sclerae normal  HENT: ear canals and TM's normal, nose and mouth without ulcers or lesions  NECK: no adenopathy, no asymmetry, masses, or scars and thyroid normal to palpation  RESP: lungs clear to auscultation - no rales, rhonchi or wheezes  CV: regular rate and rhythm, normal S1 S2, no S3 or S4, no murmur, click or rub, no peripheral edema and peripheral pulses strong  ABDOMEN: soft, nontender, no hepatosplenomegaly, no masses and bowel sounds normal  MS: no gross musculoskeletal defects noted, no edema  SKIN: no suspicious lesions or rashes  NEURO: Normal strength and tone, mentation intact and speech normal  PSYCH: mentation appears normal, affect normal/bright    Diagnostic Test Results:  Labs reviewed in Epic    ASSESSMENT/PLAN:   (Z00.00) Routine general medical examination at a health care facility  (primary encounter diagnosis)  Comment: Stable health. See epic orders.   Plan: **Comprehensive metabolic panel FUTURE 2mo,         **CBC with platelets FUTURE 2mo, **TSH with         free T4 reflex FUTURE 2mo, Lipid panel reflex         to direct LDL Fasting, PSA, screen          (I48.91) Atrial fibrillation, unspecified type (H)  Comment: Appears to be in sinus rhythm. Refilled Eliquis as he has agreed with his cardiologist to continue to take.  Plan: apixaban ANTICOAGULANT (ELIQUIS ANTICOAGULANT)         5 MG tablet, **TSH with free T4 reflex " "FUTURE         2mo, OFFICE/OUTPT VISIT,EST,LEVL III          (I42.8) Nonischemic cardiomyopathy (H)  Comment: No signs or symptoms of active CHF. Continue current meds.   Plan: lisinopril (ZESTRIL) 2.5 MG tablet,         OFFICE/OUTPT VISIT,EST,LEVL III          (G25.0) Benign essential tremor  Comment: Symptoms improved on propranolol--refilled.   Plan: propranolol (INDERAL) 20 MG tablet, **TSH with         free T4 reflex FUTURE 2mo, OFFICE/OUTPT         VISIT,EST,LEVL III          (N52.01) Erectile dysfunction due to arterial insufficiency  Comment: Offered refill Rx for Cialis.   Plan: tadalafil (CIALIS) 20 MG tablet, OFFICE/OUTPT         VISIT,EST,LEVL III          (Z13.6) CARDIOVASCULAR SCREENING; LDL GOAL LESS THAN 160  Plan: **Comprehensive metabolic panel FUTURE 2mo,         Lipid panel reflex to direct LDL Fasting          (Z12.5) Special screening for malignant neoplasm of prostate  Plan: PSA, screen          (Z79.899) Medication management  Plan: CBC with platelets FUTURE 2mo    Patient has been advised of split billing requirements and indicates understanding: Yes    COUNSELING:   Reviewed preventive health counseling, as reflected in patient instructions    Estimated body mass index is 25.95 kg/m  as calculated from the following:    Height as of this encounter: 1.943 m (6' 4.5\").    Weight as of this encounter: 98 kg (216 lb).         He reports that he has never smoked. He has never used smokeless tobacco.      Counseling Resources:  ATP IV Guidelines  Pooled Cohorts Equation Calculator  FRAX Risk Assessment  ICSI Preventive Guidelines  Dietary Guidelines for Americans, 2010  [facility] MyPlate  ASA Prophylaxis  Lung CA Screening    Marc Mahmood MD,   [facility]        Patient Instructions   Everything looks fine!    Refills of medication have been faxed to your pharmacy.     Lab results will be available soon on [facility].    See me in a year, sooner if problems.      "

## 2022-08-16 NOTE — PATIENT INSTRUCTIONS
Everything looks fine!    Refills of medication have been faxed to your pharmacy.     Lab results will be available soon on [facility].    See me in a year, sooner if problems.

## 2022-08-17 LAB
ALBUMIN SERPL-MCNC: 3.6 G/DL (ref 3.4–5)
ALP SERPL-CCNC: 62 U/L (ref 40–150)
ALT SERPL W P-5'-P-CCNC: 24 U/L (ref 0–70)
ANION GAP SERPL CALCULATED.3IONS-SCNC: 4 MMOL/L (ref 3–14)
AST SERPL W P-5'-P-CCNC: 25 U/L (ref 0–45)
BILIRUB SERPL-MCNC: 0.8 MG/DL (ref 0.2–1.3)
BUN SERPL-MCNC: 17 MG/DL (ref 7–30)
CALCIUM SERPL-MCNC: 8.7 MG/DL (ref 8.5–10.1)
CHLORIDE BLD-SCNC: 109 MMOL/L (ref 94–109)
CHOLEST SERPL-MCNC: 156 MG/DL
CO2 SERPL-SCNC: 29 MMOL/L (ref 20–32)
CREAT SERPL-MCNC: 0.97 MG/DL (ref 0.66–1.25)
FASTING STATUS PATIENT QL REPORTED: YES
GFR SERPL CREATININE-BSD FRML MDRD: 89 ML/MIN/1.73M2
GLUCOSE BLD-MCNC: 98 MG/DL (ref 70–99)
HDLC SERPL-MCNC: 48 MG/DL
LDLC SERPL CALC-MCNC: 91 MG/DL
NONHDLC SERPL-MCNC: 108 MG/DL
POTASSIUM BLD-SCNC: 4.4 MMOL/L (ref 3.4–5.3)
PROT SERPL-MCNC: 6.5 G/DL (ref 6.8–8.8)
PSA SERPL-MCNC: 0.57 UG/L (ref 0–4)
SODIUM SERPL-SCNC: 142 MMOL/L (ref 133–144)
TRIGL SERPL-MCNC: 83 MG/DL
TSH SERPL DL<=0.005 MIU/L-ACNC: 1.22 MU/L (ref 0.4–4)

## 2022-09-07 DIAGNOSIS — I48.91 ATRIAL FIBRILLATION, UNSPECIFIED TYPE (H): ICD-10-CM

## 2022-09-07 DIAGNOSIS — G25.0 BENIGN ESSENTIAL TREMOR: ICD-10-CM

## 2022-09-09 RX ORDER — APIXABAN 5 MG/1
TABLET, FILM COATED ORAL
Qty: 180 TABLET | Refills: 3 | OUTPATIENT
Start: 2022-09-09

## 2022-09-09 RX ORDER — PROPRANOLOL HYDROCHLORIDE 20 MG/1
TABLET ORAL
Qty: 180 TABLET | Refills: 3 | OUTPATIENT
Start: 2022-09-09

## 2022-09-09 NOTE — TELEPHONE ENCOUNTER
Prescription not needed, both medications ordered 8/16/22 to [facility].     Cindy Leggett RN  [facility]

## 2022-10-30 ENCOUNTER — HEALTH MAINTENANCE LETTER (OUTPATIENT)
Age: 62
End: 2022-10-30

## 2022-11-02 ENCOUNTER — TRANSFERRED RECORDS (OUTPATIENT)
Dept: HEALTH INFORMATION MANAGEMENT | Facility: CLINIC | Age: 62
End: 2022-11-02

## 2023-03-20 ENCOUNTER — ANCILLARY PROCEDURE (OUTPATIENT)
Dept: GENERAL RADIOLOGY | Facility: CLINIC | Age: 63
End: 2023-03-20
Attending: PODIATRIST
Payer: COMMERCIAL

## 2023-03-20 ENCOUNTER — E-VISIT (OUTPATIENT)
Dept: INTERNAL MEDICINE | Facility: CLINIC | Age: 63
End: 2023-03-20
Payer: COMMERCIAL

## 2023-03-20 ENCOUNTER — OFFICE VISIT (OUTPATIENT)
Dept: PODIATRY | Facility: CLINIC | Age: 63
End: 2023-03-20
Payer: COMMERCIAL

## 2023-03-20 VITALS
BODY MASS INDEX: 27.03 KG/M2 | DIASTOLIC BLOOD PRESSURE: 68 MMHG | SYSTOLIC BLOOD PRESSURE: 110 MMHG | HEIGHT: 76 IN | WEIGHT: 222 LBS

## 2023-03-20 DIAGNOSIS — H11.33: Primary | ICD-10-CM

## 2023-03-20 DIAGNOSIS — Z87.81 STATUS POST OPEN REDUCTION WITH INTERNAL FIXATION (ORIF) OF FRACTURE OF ANKLE: ICD-10-CM

## 2023-03-20 DIAGNOSIS — Z98.890 STATUS POST OPEN REDUCTION WITH INTERNAL FIXATION (ORIF) OF FRACTURE OF ANKLE: ICD-10-CM

## 2023-03-20 DIAGNOSIS — Z98.890 STATUS POST OPEN REDUCTION WITH INTERNAL FIXATION (ORIF) OF FRACTURE OF ANKLE: Primary | ICD-10-CM

## 2023-03-20 DIAGNOSIS — Z87.81 STATUS POST OPEN REDUCTION WITH INTERNAL FIXATION (ORIF) OF FRACTURE OF ANKLE: Primary | ICD-10-CM

## 2023-03-20 PROCEDURE — 99421 OL DIG E/M SVC 5-10 MIN: CPT | Performed by: INTERNAL MEDICINE

## 2023-03-20 PROCEDURE — 99204 OFFICE O/P NEW MOD 45 MIN: CPT | Performed by: PODIATRIST

## 2023-03-20 PROCEDURE — 73610 X-RAY EXAM OF ANKLE: CPT | Mod: TC | Performed by: RADIOLOGY

## 2023-03-20 NOTE — PROGRESS NOTES
"Foot & Ankle Surgery  March 20, 2023    CC: \"Consult on removing 2 screws from R ankle\"    I was asked to see John Venancio Jorge regarding the chief complaint by:  self    HPI:  Pt is a 63 year old male who presents with above complaint.  Recent right ankle fracture, underwent ORIF in [place] on 8/20/2023.  Describes aching pain, \"after swimming or long walks or standing aching by wound area\".  Pain 3 out of 10 \"5 times a day\", worse with \"certain positions, after exercise\".  Worse with \"exercise stretching\".    ROS:   Pos for CC.  The patient denies current nausea, vomiting, chills, fevers, belly pain, calf pain, chest pain or SOB.  Complete remainder of ROS is otherwise neg.    VITALS:  There were no vitals filed for this visit.    PMH:    Past Medical History:   Diagnosis Date     Acute lumbar radiculopathy      Allergic rhinitis, cause unspecified      Arrhythmia      Atrial fibrillation (H)      Cardiomyopathy (H)      Colon polyp     pre-cancerous and non-cancerous     Hypertension     [facility]     Lumbar disc herniation 2019    Responded to conservative mgmt     Snores        SXHX:    Past Surgical History:   Procedure Laterality Date     ANESTHESIA CARDIOVERSION N/A 12/28/2015    Procedure: ANESTHESIA CARDIOVERSION;  Surgeon: GENERIC ANESTHESIA PROVIDER;  Location:  OR     CARDIAC SURGERY  08/18/2017    Ablation     COLONOSCOPY N/A 11/10/2015    No polyps. Sigmoid diverticulosis. Repeat in 5 years. Procedure: COLONOSCOPY;  Surgeon: Edin Wallace MD, MD;  Location:  GI     COLONOSCOPY  11/04/2020    One adenomatous polyp (2 hyperplastic polyps). Repeat in 5 yrs.     HERNIORRHAPHY INGUINAL  12/27/2011    Procedure:HERNIORRHAPHY INGUINAL; LEFT INGUINAL HERNIA REPAIR WITH MESH; Surgeon:SYLVESTER KOEHLER; Location:[facility]     HERNIORRHAPHY INGUINAL Right 08/30/2016    Procedure: HERNIORRHAPHY INGUINAL;  Surgeon: Sixto Wolf MD;  Location:  OR     ORTHOPEDIC SURGERY  ~2008    bunionectomy " on right        MEDS:    Current Outpatient Medications   Medication     apixaban ANTICOAGULANT (ELIQUIS ANTICOAGULANT) 5 MG tablet     Ascorbic Acid (VITAMIN C PO)     lisinopril (ZESTRIL) 2.5 MG tablet     Multiple Vitamins-Minerals (DAILY MULTIVITAMIN PO)     Omega-3 Fatty Acids (OMEGA-3 FISH OIL PO)     propranolol (INDERAL) 20 MG tablet     tadalafil (CIALIS) 20 MG tablet     No current facility-administered medications for this visit.       ALL:     Allergies   Allergen Reactions     No Known Drug Allergies        FMH:    Family History   Problem Relation Age of Onset     Breast Cancer Mother      Diabetes Mother         Born 1929     Diabetes Father          age 90     Respiratory Father         COPD-exsmoker     Depression Sister      Thyroid Disease Sister      Thyroid Disease Sister      Fibromyalgia Sister      Family History Negative Sister      Depression Brother      Thyroid Disease Brother      Cancer Brother         ?type of CA     Cancer Brother         Germ cell cancer in the lung, not a smoker, rare type.     Family History Negative Brother      Diabetes Brother         10 Yrs Older     Colon Cancer No family hx of        SocHx:    Social History     Socioeconomic History     Marital status:      Spouse name: Calista Topete     Number of children: 2     Years of education: Not on file     Highest education level: Bachelor's degree (e.g., BA, AB, BS)   Occupational History     Occupation: Manager of bus garage     Comment: [employer]     Employer: [employer]   Tobacco Use     Smoking status: Never     Smokeless tobacco: Never   Vaping Use     Vaping Use: Never used   Substance and Sexual Activity     Alcohol use: Yes     Alcohol/week: 0.0 standard drinks     Comment: 1 drink weekly     Drug use: No     Sexual activity: Yes     Partners: Female     Birth control/protection: Female Surgical     Comment: Tubes Tied   Other Topics Concern     Parent/sibling w/ CABG, MI or  "angioplasty before 65F 55M? No      Service Not Asked     Blood Transfusions Not Asked     Caffeine Concern No     Comment: 3-4 cups of coffee     Occupational Exposure Not Asked     Hobby Hazards Not Asked     Sleep Concern Yes     Comment: Snores per wife     Stress Concern Yes     Comment: Job staffing issues     Weight Concern Not Asked     Special Diet Yes     Comment: less sweets     Back Care Not Asked     Exercise Yes     Comment: Swimming and walking     Bike Helmet Not Asked     Seat Belt Yes     Self-Exams Not Asked   Social History Narrative    Retired in June 2021.     Swims at \"[employer]\", about 1/2 to 3/4 miles, 1-2 times weekly. Walks a couple miles daily. 40 minutes of stretching daily    , two children. One son just graduated college [employer] and is a , another son a Sophomore at [employer] in fall 2022.     Social Determinants of Health     Financial Resource Strain: Low Risk      Difficulty of Paying Living Expenses: Not hard at all   Food Insecurity: No Food Insecurity     Worried About Running Out of Food in the Last Year: Never true     Ran Out of Food in the Last Year: Never true   Transportation Needs: No Transportation Needs     Lack of Transportation (Medical): No     Lack of Transportation (Non-Medical): No   Physical Activity: Sufficiently Active     Days of Exercise per Week: 7 days     Minutes of Exercise per Session: 40 min   Stress: Not on file   Social Connections: Not on file   Intimate Partner Violence: Not At Risk     Fear of Current or Ex-Partner: No     Emotionally Abused: No     Physically Abused: No     Sexually Abused: No   Housing Stability: Low Risk      Unable to Pay for Housing in the Last Year: No     Number of Places Lived in the Last Year: 1     Unstable Housing in the Last Year: No           EXAMINATION:  Gen:   No apparent distress  Neuro:   A&Ox3, no deficits  Psych:    Answering questions appropriately for age and situation with " normal affect  Head:    NCAT  Eye:    Visual scanning without deficit  Ear:    Response to auditory stimuli wnl  Lung:    Non-labored breathing on RA noted  Abd:    NTND per patient report  Lymph:    Neg for pitting/non-pitting edema BLE  Vasc:    Pulses palpable, CFT minimally delayed  Neuro:    Light touch sensation intact but he does note paresthesias from the surgical site towards the first MPJ along the saphenous nerve distribution  Derm:    Well-healed incision medial right ankle  MSK:    Right lower extremity -the patient has tenderness on palpation just distal to the medial malleolus, at the level of the screw heads.  There is mild discomfort along the medial malleolus as well.  There is no medial recess or anterior gutter pain.  No PT tendon pain is noted.  Calf:    Neg for redness, swelling or tenderness      Imaging: 3 views weightbearing right ankle -2 screws at the medial malleolus.  No fracture line is visualized, there does appear to be full bridging of bone seen.    Assessment:  63 year old male with healed right medial malleolus fracture, status post ORIF 8/20/2023 by an outside surgeon, with pain at the level of the screw heads      Medical Decision Making/Plan:  Discussed etiologies, anatomy and options  1.  Healed right medial malleolus fracture, status post ORIF 8/20/2023 by an outside surgeon, with pain at the level of the screw heads  -I personally reviewed and interpreted the patient's lower extremity history pertinent to today's visit, including imaging/labs, in preparation for initiating a treatment program.  -I personally reviewed and interpreted the x-ray results.  The fracture appears to be fully healed.  There is no evidence of hardware loosening  -We discussed that pain, function, swelling and scar tissue at 1 year out from surgery is likely where he will be moving forward.  -As he is continuing to show improvement, we discussed the option of simply monitor for now, which I think is  very reasonable  -We did discuss the option for hardware removal, as well as a generic postop recovery for patient information and planning.  We discussed that I generally recommend waiting at least 6 months from the date of surgery.  At some point in the near future, symptoms are no longer improving and symptom levels are limiting him, removal would be a reasonable option to consider.  Our surgery scheduling handout was dispensed    Job duties; time off work - semi-retired; driving/phone calls @ [employer]  Smoking history - neg  Vit D - n/a  Diabetic/A1c - neg  Hemoglobin - draw prior  Clot history - neg/neg; on Eliquis  Allergies to surgical implants/suture - neg  Allergies/issues with narcotics - neg    Procedure(s):  1.  Hardware removal right ankle  Consent: above  Diagnosis:  Painful hardware  Equipment/Vendor: none        Follow up:  prn or sooner with acute issues      Patient's medical history was reviewed today      Ariel Marquez DPM FACFAS FACFAOM  Podiatric Foot & Ankle Surgeon  [facility]  [phone]    Disclaimer: This note consists of symbols derived from keyboarding, dictation and/or voice recognition software. As a result, there may be errors in the script that have gone undetected. Please consider this when interpreting information found in this chart.

## 2023-03-20 NOTE — PATIENT INSTRUCTIONS
"Thank you for choosing [facility] Podiatry / Foot & Ankle Surgery!    DR. PETERSEN'S CLINIC LOCATIONS:     [facility] (Friday) TRIAGE LINE: [phone] [address]  APPOINTMENTS: [phone]   [place] [address] RADIOLOGY: [phone]    PHYSICAL THERAPY: [phone]    SET UP SURGERY: [phone]   [place] (Mon-Tues AM-Thurs) BILLING QUESTIONS: [phone]   [address]  [address] FAX: [phone]   [place] [address]      FOOT & ANKLE SURGERY PLANNING CHECKLIST  If you have decided to have surgery, follow these steps to get the procedure scheduled and to have the proper paperwork filled out. If you are unsure about surgery or would like to sit down and further discuss your issue and treatment options, please make an appointment.    1.  Pick the date that you would like to have surgery. Surgery is done on a Wednesday at [facility]. Keep in mind that you will likely need at least 2 weeks off after the procedure for proper rest and healing.    2.  Call the surgery scheduling line at [phone] to get the procedure scheduled. Our  will also help you make your pre-operative physical with a primary doctor, your surgery consult appointment with me and your one week follow up after surgery for your first dressing change.    3.  If our surgery scheduler does not make your surgery consultation appointment with me then call to schedule that. When making the appointment, say \"I need to make a 30 minute surgical consult appointment\".     4. Contact your employer to request time off from work if needed. If there is going to be FMLA used, please have them fax the forms to [phone] at least one week prior to surgery.    * If you have any post-operative questions regarding your procedure, call our triage team at the [facility] at [phone].    Follow Up: PRN  "

## 2023-03-20 NOTE — LETTER
"    3/20/2023         RE: John Patel  [address]   [place] [address]        Dear Colleague,    Thank you for referring your patient, John Patel, to the [facility] PODIATRY. Please see a copy of my visit note below.    Foot & Ankle Surgery  March 20, 2023    CC: \"Consult on removing 2 screws from R ankle\"    I was asked to see John Patel regarding the chief complaint by:  self    HPI:  Pt is a 63 year old male who presents with above complaint.  Recent right ankle fracture, underwent ORIF in [place] on 8/20/2023.  Describes aching pain, \"after swimming or long walks or standing aching by wound area\".  Pain 3 out of 10 \"5 times a day\", worse with \"certain positions, after exercise\".  Worse with \"exercise stretching\".    ROS:   Pos for CC.  The patient denies current nausea, vomiting, chills, fevers, belly pain, calf pain, chest pain or SOB.  Complete remainder of ROS is otherwise neg.    VITALS:  There were no vitals filed for this visit.    PMH:    Past Medical History:   Diagnosis Date     Acute lumbar radiculopathy      Allergic rhinitis, cause unspecified      Arrhythmia      Atrial fibrillation (H)      Cardiomyopathy (H)      Colon polyp     pre-cancerous and non-cancerous     Hypertension     [facility]     Lumbar disc herniation 2019    Responded to conservative mgmt     Snores        SXHX:    Past Surgical History:   Procedure Laterality Date     ANESTHESIA CARDIOVERSION N/A 12/28/2015    Procedure: ANESTHESIA CARDIOVERSION;  Surgeon: GENERIC ANESTHESIA PROVIDER;  Location:  OR     CARDIAC SURGERY  08/18/2017    Ablation     COLONOSCOPY N/A 11/10/2015    No polyps. Sigmoid diverticulosis. Repeat in 5 years. Procedure: COLONOSCOPY;  Surgeon: Edin Wallace MD, MD;  Location:  GI     COLONOSCOPY  11/04/2020    One adenomatous polyp (2 hyperplastic polyps). Repeat in 5 yrs.     HERNIORRHAPHY INGUINAL  12/27/2011    " Procedure:HERNIORRHAPHY INGUINAL; LEFT INGUINAL HERNIA REPAIR WITH MESH; Surgeon:SYLVESTER KOEHLER; Location: [facility]     HERNIORRHAPHY INGUINAL Right 2016    Procedure: HERNIORRHAPHY INGUINAL;  Surgeon: Sixto Wolf MD;  Location: [facility] OR     ORTHOPEDIC SURGERY  ~    bunionectomy on right        MEDS:    Current Outpatient Medications   Medication     apixaban ANTICOAGULANT (ELIQUIS ANTICOAGULANT) 5 MG tablet     Ascorbic Acid (VITAMIN C PO)     lisinopril (ZESTRIL) 2.5 MG tablet     Multiple Vitamins-Minerals (DAILY MULTIVITAMIN PO)     Omega-3 Fatty Acids (OMEGA-3 FISH OIL PO)     propranolol (INDERAL) 20 MG tablet     tadalafil (CIALIS) 20 MG tablet     No current facility-administered medications for this visit.       ALL:     Allergies   Allergen Reactions     No Known Drug Allergies        FMH:    Family History   Problem Relation Age of Onset     Breast Cancer Mother      Diabetes Mother         Born 1928     Diabetes Father          age 90     Respiratory Father         COPD-exsmoker     Depression Sister      Thyroid Disease Sister      Thyroid Disease Sister      Fibromyalgia Sister      Family History Negative Sister      Depression Brother      Thyroid Disease Brother      Cancer Brother         ?type of CA     Cancer Brother         Germ cell cancer in the lung, not a smoker, rare type.     Family History Negative Brother      Diabetes Brother         10 Yrs Older     Colon Cancer No family hx of        SocHx:    Social History     Socioeconomic History     Marital status:      Spouse name: Calista Topete     Number of children: 2     Years of education: Not on file     Highest education level: Bachelor's degree (e.g., BA, AB, BS)   Occupational History     Occupation: Manager of bus garage     Comment: [employer]     Employer: [employer]   Tobacco Use     Smoking status: Never     Smokeless tobacco: Never   Vaping Use     Vaping Use: Never used   Substance and Sexual  "Activity     Alcohol use: Yes     Alcohol/week: 0.0 standard drinks     Comment: 1 drink weekly     Drug use: No     Sexual activity: Yes     Partners: Female     Birth control/protection: Female Surgical     Comment: Tubes Tied   Other Topics Concern     Parent/sibling w/ CABG, MI or angioplasty before 65F 55M? No      Service Not Asked     Blood Transfusions Not Asked     Caffeine Concern No     Comment: 3-4 cups of coffee     Occupational Exposure Not Asked     Hobby Hazards Not Asked     Sleep Concern Yes     Comment: Snores per wife     Stress Concern Yes     Comment: Job staffing issues     Weight Concern Not Asked     Special Diet Yes     Comment: less sweets     Back Care Not Asked     Exercise Yes     Comment: Swimming and walking     Bike Helmet Not Asked     Seat Belt Yes     Self-Exams Not Asked   Social History Narrative    Retired in June 2021.     Swims at \"[employer]\", about 1/2 to 3/4 miles, 1-2 times weekly. Walks a couple miles daily. 40 minutes of stretching daily    , two children. One son just graduated college [employer] and is a , another son a Sophomore at [employer] in fall 2022.     Social Determinants of Health     Financial Resource Strain: Low Risk      Difficulty of Paying Living Expenses: Not hard at all   Food Insecurity: No Food Insecurity     Worried About Running Out of Food in the Last Year: Never true     Ran Out of Food in the Last Year: Never true   Transportation Needs: No Transportation Needs     Lack of Transportation (Medical): No     Lack of Transportation (Non-Medical): No   Physical Activity: Sufficiently Active     Days of Exercise per Week: 7 days     Minutes of Exercise per Session: 40 min   Stress: Not on file   Social Connections: Not on file   Intimate Partner Violence: Not At Risk     Fear of Current or Ex-Partner: No     Emotionally Abused: No     Physically Abused: No     Sexually Abused: No   Housing Stability: Low Risk      " Unable to Pay for Housing in the Last Year: No     Number of Places Lived in the Last Year: 1     Unstable Housing in the Last Year: No           EXAMINATION:  Gen:   No apparent distress  Neuro:   A&Ox3, no deficits  Psych:    Answering questions appropriately for age and situation with normal affect  Head:    NCAT  Eye:    Visual scanning without deficit  Ear:    Response to auditory stimuli wnl  Lung:    Non-labored breathing on RA noted  Abd:    NTND per patient report  Lymph:    Neg for pitting/non-pitting edema BLE  Vasc:    Pulses palpable, CFT minimally delayed  Neuro:    Light touch sensation intact but he does note paresthesias from the surgical site towards the first MPJ along the saphenous nerve distribution  Derm:    Well-healed incision medial right ankle  MSK:    Right lower extremity -the patient has tenderness on palpation just distal to the medial malleolus, at the level of the screw heads.  There is mild discomfort along the medial malleolus as well.  There is no medial recess or anterior gutter pain.  No PT tendon pain is noted.  Calf:    Neg for redness, swelling or tenderness      Imaging: 3 views weightbearing right ankle -2 screws at the medial malleolus.  No fracture line is visualized, there does appear to be full bridging of bone seen.    Assessment:  63 year old male with healed right medial malleolus fracture, status post ORIF 8/20/2023 by an outside surgeon, with pain at the level of the screw heads      Medical Decision Making/Plan:  Discussed etiologies, anatomy and options  1.  Healed right medial malleolus fracture, status post ORIF 8/20/2023 by an outside surgeon, with pain at the level of the screw heads  -I personally reviewed and interpreted the patient's lower extremity history pertinent to today's visit, including imaging/labs, in preparation for initiating a treatment program.  -I personally reviewed and interpreted the x-ray results.  The fracture appears to be fully healed.   There is no evidence of hardware loosening  -We discussed that pain, function, swelling and scar tissue at 1 year out from surgery is likely where he will be moving forward.  -As he is continuing to show improvement, we discussed the option of simply monitor for now, which I think is very reasonable  -We did discuss the option for hardware removal, as well as a generic postop recovery for patient information and planning.  We discussed that I generally recommend waiting at least 6 months from the date of surgery.  At some point in the near future, symptoms are no longer improving and symptom levels are limiting him, removal would be a reasonable option to consider.  Our surgery scheduling handout was dispensed    Job duties; time off work - semi-retired; driving/phone calls @ [employer]  Smoking history - neg  Vit D - n/a  Diabetic/A1c - neg  Hemoglobin - draw prior  Clot history - neg/neg; on Eliquis  Allergies to surgical implants/suture - neg  Allergies/issues with narcotics - neg    Procedure(s):  1.  Hardware removal right ankle  Consent: above  Diagnosis:  Painful hardware  Equipment/Vendor: none        Follow up:  prn or sooner with acute issues      Patient's medical history was reviewed today      Ariel Marquez DPM FACFAS FACFAOM  Podiatric Foot & Ankle Surgeon  [facility]  [phone]    Disclaimer: This note consists of symbols derived from keyboarding, dictation and/or voice recognition software. As a result, there may be errors in the script that have gone undetected. Please consider this when interpreting information found in this chart.            Again, thank you for allowing me to participate in the care of your patient.        Sincerely,        Ariel Marquez DPM, JUJU

## 2023-03-22 ENCOUNTER — PREP FOR PROCEDURE (OUTPATIENT)
Dept: PODIATRY | Facility: CLINIC | Age: 63
End: 2023-03-22
Payer: COMMERCIAL

## 2023-03-22 ENCOUNTER — MYC MEDICAL ADVICE (OUTPATIENT)
Dept: PODIATRY | Facility: CLINIC | Age: 63
End: 2023-03-22
Payer: COMMERCIAL

## 2023-03-22 ENCOUNTER — TELEPHONE (OUTPATIENT)
Dept: PODIATRY | Facility: CLINIC | Age: 63
End: 2023-03-22
Payer: COMMERCIAL

## 2023-03-22 DIAGNOSIS — T84.84XA PAINFUL ORTHOPAEDIC HARDWARE (H): Primary | ICD-10-CM

## 2023-03-22 NOTE — TELEPHONE ENCOUNTER
Patient has been scheduled for surgery. Details are below.      SURGERY INFORMATION    Date of Surgery: 5/17/23    Approximate Arrival Time: 5:30AMSurgeon: Dr. Ariel Marquez  Procedure: Right ankle hardware removal  Location: [facility], [address] [place] [address]  PreOp Physical: pt will schedule  PostOp: 5/25 & 6/1  Packet Mailed: yes  Added to [facility]: yes

## 2023-03-22 NOTE — TELEPHONE ENCOUNTER
Patient called to schedule hardware removal. He was asking for May 10th at [facility] but this is a [place] block day. Once surgery orders are placed we can find a date/location.

## 2023-03-22 NOTE — PROGRESS NOTES
"Order signed for \"hardware removal right ankle\"l    Gen + Popliteal/saphenous nerve blocks    Supine    No vendor    SAMEER VillarealM [facility] FACFAOM  Podiatric Foot & Ankle Surgeon  [facility]  [phone]      "

## 2023-04-17 NOTE — TELEPHONE ENCOUNTER
Patient calls to ask if we got his records from his previous surgery yet. They are not scanned into the media tab. Will check with Dr Marquez to see if he got them.

## 2023-05-10 ENCOUNTER — OFFICE VISIT (OUTPATIENT)
Dept: INTERNAL MEDICINE | Facility: CLINIC | Age: 63
End: 2023-05-10
Payer: COMMERCIAL

## 2023-05-10 VITALS
WEIGHT: 232.9 LBS | OXYGEN SATURATION: 95 % | DIASTOLIC BLOOD PRESSURE: 70 MMHG | BODY MASS INDEX: 28.36 KG/M2 | RESPIRATION RATE: 18 BRPM | HEIGHT: 76 IN | SYSTOLIC BLOOD PRESSURE: 108 MMHG | TEMPERATURE: 97.9 F | HEART RATE: 58 BPM

## 2023-05-10 DIAGNOSIS — Z79.01 LONG TERM CURRENT USE OF ANTICOAGULANT THERAPY: ICD-10-CM

## 2023-05-10 DIAGNOSIS — R25.1 TREMOR: ICD-10-CM

## 2023-05-10 DIAGNOSIS — Z86.79 S/P ABLATION OF ATRIAL FIBRILLATION: ICD-10-CM

## 2023-05-10 DIAGNOSIS — Z98.890 S/P ABLATION OF ATRIAL FIBRILLATION: ICD-10-CM

## 2023-05-10 DIAGNOSIS — Z01.818 PREOP GENERAL PHYSICAL EXAM: Primary | ICD-10-CM

## 2023-05-10 LAB
CREAT SERPL-MCNC: 1.18 MG/DL (ref 0.67–1.17)
GFR SERPL CREATININE-BSD FRML MDRD: 70 ML/MIN/1.73M2
HGB BLD-MCNC: 14.9 G/DL (ref 13.3–17.7)
POTASSIUM SERPL-SCNC: 4.8 MMOL/L (ref 3.4–5.3)

## 2023-05-10 PROCEDURE — 36415 COLL VENOUS BLD VENIPUNCTURE: CPT

## 2023-05-10 PROCEDURE — 84132 ASSAY OF SERUM POTASSIUM: CPT

## 2023-05-10 PROCEDURE — 93000 ELECTROCARDIOGRAM COMPLETE: CPT

## 2023-05-10 PROCEDURE — 85018 HEMOGLOBIN: CPT

## 2023-05-10 PROCEDURE — 99214 OFFICE O/P EST MOD 30 MIN: CPT

## 2023-05-10 PROCEDURE — 82565 ASSAY OF CREATININE: CPT

## 2023-05-10 NOTE — PROGRESS NOTES
[place] [address]  [address]  [facility] [address]  Phone: [phone]  Primary Provider: Marc Mahmood  Pre-op Performing Provider: ARIEL VANG    PREOPERATIVE EVALUATION:  Today's date: 5/10/2023    John Patel is a 63 year old male who presents for a preoperative evaluation.      5/10/2023     1:45 PM   Additional Questions   Roomed by Shelby Wilkes MA   Accompanied by Himself     Surgical Information:  Surgery/Procedure: Hardware removal right ankle  Surgery Location: [facility]  Surgeon: Dr. Ariel Marquez  Surgery Date: 5/17/23  Time of Surgery: 7:30 AM  Where patient plans to recover: At home with family  Fax number for surgical facility: Note does not need to be faxed, will be available electronically in Epic.    Assessment & Plan     The proposed surgical procedure is considered INTERMEDIATE risk.    Preop general physical exam  Patient is fit for surgery.  We will check creatinine and potassium and hemoglobin.  Patient does not have chest pain with exertion.  He should hold multivitamin starting today, and Eliquis for 2 days before surgery.  He can take propanolol for surgery and all other scheduled meds  - EKG 12-lead complete w/read - Clinics  - Creatinine; Future  - Potassium; Future  - Hemoglobin; Future    S/P ablation of atrial fibrillation  EKG sinus rhythm today.  Per patient report he has not been in A-fib since ablation several years ago    Tremor  Noted    Long term current use of anticoagulant therapy  Patient on Eliquis.  Should hold for 2 days before     - No identified additional risk factors other than previously addressed    Antiplatelet or Anticoagulation Medication Instructions:   - apixaban (Eliquis), edoxaban (Savaysa), rivaroxaban (Xarelto): Bleeding risk is moderate or high for this procedure AND CrCl  (>=) 50 mL/min. HOLD 2 days before surgery.     Additional Medication Instructions:  can take  propanolol day of surgery. Hold all other meds. Hold multivitamin for one week before    RECOMMENDATION:  APPROVAL GIVEN to proceed with proposed procedure, without further diagnostic evaluation.      Subjective     HPI related to upcoming procedure: R ankle hardware removal         5/9/2023     8:07 PM   Preop Questions   1. Have you ever had a heart attack or stroke? No   2. Have you ever had surgery on your heart or blood vessels, such as a stent placement, a coronary artery bypass, or surgery on an artery in your head, neck, heart, or legs? YES- ablation    3. Do you have chest pain with activity? No   4. Do you have a history of  heart failure? No   5. Do you currently have a cold, bronchitis or symptoms of other infection? No   6. Do you have a cough, shortness of breath, or wheezing? No   7. Do you or anyone in your family have previous history of blood clots? No   8. Do you or does anyone in your family have a serious bleeding problem such as prolonged bleeding following surgeries or cuts? No   9. Have you ever had problems with anemia or been told to take iron pills? No   10. Have you had any abnormal blood loss such as black, tarry or bloody stools? No   11. Have you ever had a blood transfusion? No   12. Are you willing to have a blood transfusion if it is medically needed before, during, or after your surgery? Yes   13. Have you or any of your relatives ever had problems with anesthesia? No   14. Do you have sleep apnea, excessive snoring or daytime drowsiness? No   15. Do you have any artifical heart valves or other implanted medical devices like a pacemaker, defibrillator, or continuous glucose monitor? No   16. Do you have artificial joints? No   17. Are you allergic to latex? No       Health Care Directive:  Patient does not have a Health Care Directive or Living Will: Patient states has Advance Directive and will bring in a copy to clinic.    Preoperative Review of :   reviewed - no record of  controlled substances prescribed.    Status of Chronic Conditions:  No current problems, hx of A-fib s/p ablation    Review of Systems  CONSTITUTIONAL: NEGATIVE for fever, chills, change in weight  INTEGUMENTARY/SKIN: NEGATIVE for worrisome rashes, moles or lesions  EYES: NEGATIVE for vision changes or irritation  ENT/MOUTH: NEGATIVE for ear, mouth and throat problems  RESP: NEGATIVE for significant cough or SOB  CV: NEGATIVE for chest pain, palpitations or peripheral edema  GI: NEGATIVE for nausea, abdominal pain, heartburn, or change in bowel habits  : NEGATIVE for frequency, dysuria, or hematuria  MUSCULOSKELETAL: NEGATIVE for significant arthralgias or myalgia  NEURO: NEGATIVE for weakness, dizziness or paresthesias  ENDOCRINE: NEGATIVE for temperature intolerance, skin/hair changes  HEME: NEGATIVE for bleeding problems  PSYCHIATRIC: NEGATIVE for changes in mood or affect    Patient Active Problem List    Diagnosis Date Noted     Acute lumbar radiculopathy      Priority: Medium     Raynaud's disease without gangrene 08/06/2019     Priority: Medium     Nonischemic cardiomyopathy (H) 08/22/2018     Priority: Medium     Benign neoplasm of colon 10/20/2015     Priority: Medium     Atrial fibrillation (H) 10/20/2015     Priority: Medium     CARDIOVASCULAR SCREENING; LDL GOAL LESS THAN 160 05/29/2013     Priority: Medium     Allergic rhinitis 05/14/2004     Priority: Medium     Problem list name updated by automated process. Provider to review        Past Medical History:   Diagnosis Date     Acute lumbar radiculopathy      Allergic rhinitis, cause unspecified      Antiplatelet or antithrombotic long-term use     Eliquis     Atrial fibrillation (H)      Cardiomyopathy (H)      Colon polyp     pre-cancerous and non-cancerous     Hypertension     [facility]     Lumbar disc herniation 2019    Responded to conservative mgmt     Snores      Tremor     both hands     Past Surgical History:   Procedure Laterality Date      ANESTHESIA CARDIOVERSION N/A 2015    Procedure: ANESTHESIA CARDIOVERSION;  Surgeon: GENERIC ANESTHESIA PROVIDER;  Location: [facility] OR     ANKLE SURGERY Right      CARDIAC SURGERY  2017    Ablation     COLONOSCOPY N/A 11/10/2015    No polyps. Sigmoid diverticulosis. Repeat in 5 years. Procedure: COLONOSCOPY;  Surgeon: Edin Wallace MD, MD;  Location:  GI     COLONOSCOPY  2020    One adenomatous polyp (2 hyperplastic polyps). Repeat in 5 yrs.     HERNIORRHAPHY INGUINAL  2011    Procedure:HERNIORRHAPHY INGUINAL; LEFT INGUINAL HERNIA REPAIR WITH MESH; Surgeon:SYLVESTER KOEHLER; Location:[facility]     HERNIORRHAPHY INGUINAL Right 2016    Procedure: HERNIORRHAPHY INGUINAL;  Surgeon: Sixto Wolf MD;  Location: [facility] OR     ORTHOPEDIC SURGERY  ~    bunionectomy on right     Current Outpatient Medications   Medication Sig Dispense Refill     apixaban ANTICOAGULANT (ELIQUIS ANTICOAGULANT) 5 MG tablet Take 1 tablet (5 mg) by mouth 2 times daily 180 tablet 2     Ascorbic Acid (VITAMIN C PO) Take by mouth daily        lisinopril (ZESTRIL) 2.5 MG tablet TAKE 1 TABLET DAILY 90 tablet 3     Multiple Vitamins-Minerals (DAILY MULTIVITAMIN PO) Take by mouth daily       propranolol (INDERAL) 20 MG tablet TAKE 1 TABLET BY MOUTH TWO  TIMES DAILY 180 tablet 3     tadalafil (CIALIS) 20 MG tablet One-half to one tablet up to once daily as needed for erectile dysfunction 6 tablet 8       Allergies   Allergen Reactions     No Known Drug Allergy         Social History     Tobacco Use     Smoking status: Never     Smokeless tobacco: Never   Vaping Use     Vaping status: Never Used   Substance Use Topics     Alcohol use: Yes     Comment: 1 drink weekly     Family History   Problem Relation Age of Onset     Breast Cancer Mother      Diabetes Mother         Born 1929     Diabetes Father          age 90     Respiratory Father         COPD-exsmoker     Depression Sister      Thyroid Disease Sister       "Thyroid Disease Sister      Fibromyalgia Sister      Family History Negative Sister      Depression Brother      Thyroid Disease Brother      Cancer Brother         ?type of CA     Cancer Brother         Germ cell cancer in the lung, not a smoker, rare type.     Depression Brother      Family History Negative Brother      Diabetes Brother         10 Yrs Older     Colon Cancer No family hx of      History   Drug Use No         Objective     /70 (BP Location: Left arm, Patient Position: Sitting, Cuff Size: Adult Large)   Pulse 58   Temp 97.9  F (36.6  C) (Oral)   Resp 18   Ht 1.93 m (6' 4\")   Wt 105.6 kg (232 lb 14.4 oz)   SpO2 95%   BMI 28.35 kg/m      Physical Exam    GENERAL APPEARANCE: alert and no distress     EYES: EOMI,  PERRL     HENT: ear canals and TM's normal and nose and mouth without ulcers or lesions     NECK: no adenopathy, no asymmetry, masses, or scars and thyroid normal to palpation     RESP: lungs clear to auscultation - no rales, rhonchi or wheezes     CV: regular rates and rhythm, normal S1 S2, no S3 or S4 and no murmur, click or rub     ABDOMEN:  soft, nontender, no HSM or masses and bowel sounds normal     MS: extremities normal- no gross deformities noted, no evidence of inflammation in joints, FROM in all extremities.     SKIN: no suspicious lesions or rashes     NEURO: Normal strength and tone, sensory exam grossly normal, mentation intact and speech normal     PSYCH: mentation appears normal. and affect normal/bright     LYMPHATICS: No cervical adenopathy    Recent Labs   Lab Test 08/16/22  0911 08/11/21  0751   HGB 15.0 15.1    257    141   POTASSIUM 4.4 4.4   CR 0.97 1.10        Diagnostics:  Labs pending at this time.  Results will be reviewed when available.   EKG: appears normal, NSR, normal axis, normal intervals, no acute ST/T changes c/w ischemia, no LVH by voltage criteria, unchanged from previous tracings    Revised Cardiac Risk Index (RCRI):  The patient " has the following serious cardiovascular risks for perioperative complications:   - No serious cardiac risks = 0 points     RCRI Interpretation: 0 points: Class I (very low risk - 0.4% complication rate)       Signed Electronically by: Ariel Ralph NP  Copy of this evaluation report is provided to requesting physician.

## 2023-05-10 NOTE — PATIENT INSTRUCTIONS
For informational purposes only. Not to replace the advice of your health care provider. Copyright   2003,  [facility]. All rights reserved. Clinically reviewed by Jasmyn Dupree MD. [employer] [id number] - REV .  Preparing for Your Surgery  Getting started  A nurse will call you to review your health history and instructions. They will give you an arrival time based on your scheduled surgery time. Please be ready to share:  Your doctor's clinic name and phone number  Your medical, surgical, and anesthesia history  A list of allergies and sensitivities  A list of medicines, including herbal treatments and over-the-counter drugs  Whether the patient has a legal guardian (ask how to send us the papers in advance)  Please tell us if you're pregnant--or if there's any chance you might be pregnant. Some surgeries may injure a fetus (unborn baby), so they require a pregnancy test. Surgeries that are safe for a fetus don't always need a test, and you can choose whether to have one.   If you have a child who's having surgery, please ask for a copy of Preparing for Your Child's Surgery.    Preparing for surgery  Within 10 to 30 days of surgery: Have a pre-op exam (sometimes called an H&P, or History and Physical). This can be done at a clinic or pre-operative center.  If you're having a , you may not need this exam. Talk to your care team.  At your pre-op exam, talk to your care team about all medicines you take. If you need to stop any medicines before surgery, ask when to start taking them again.  We do this for your safety. Many medicines can make you bleed too much during surgery. Some change how well surgery (anesthesia) drugs work.  Call your insurance company to let them know you're having surgery. (If you don't have insurance, call [phone].)  Call your clinic if there's any change in your health. This includes signs of a cold or flu (sore throat, runny nose, cough, rash, fever). It  also includes a scrape or scratch near the surgery site.  If you have questions on the day of surgery, call your hospital or surgery center.  Eating and drinking guidelines  For your safety: Unless your surgeon tells you otherwise, follow the guidelines below.  Eat and drink as usual until 8 hours before you arrive for surgery. After that, no food or milk.  Drink clear liquids until 2 hours before you arrive. These are liquids you can see through, like water, Gatorade, and Propel Water. They also include plain black coffee and tea (no cream or milk), candy, and breath mints. You can spit out gum when you arrive.  If you drink alcohol: Stop drinking it the night before surgery.  If your care team tells you to take medicine on the morning of surgery, it's okay to take it with a sip of water.  Preventing infection  Shower or bathe the night before and morning of your surgery. Follow the instructions your clinic gave you. (If no instructions, use regular soap.)  Don't shave or clip hair near your surgery site. We'll remove the hair if needed.  Don't smoke or vape the morning of surgery. You may chew nicotine gum up to 2 hours before surgery. A nicotine patch is okay.  Note: Some surgeries require you to completely quit smoking and nicotine. Check with your surgeon.  Your care team will make every effort to keep you safe from infection. We will:  Clean our hands often with soap and water (or an alcohol-based hand rub).  Clean the skin at your surgery site with a special soap that kills germs.  Give you a special gown to keep you warm. (Cold raises the risk of infection.)  Wear special hair covers, masks, gowns and gloves during surgery.  Give antibiotic medicine, if prescribed. Not all surgeries need antibiotics.  What to bring on the day of surgery  Photo ID and insurance card  Copy of your health care directive, if you have one  Glasses and hearing aids (bring cases)  You can't wear contacts during surgery  Inhaler and  eye drops, if you use them (tell us about these when you arrive)  CPAP machine or breathing device, if you use them  A few personal items, if spending the night  If you have . . .  A pacemaker, ICD (cardiac defibrillator) or other implant: Bring the ID card.  An implanted stimulator: Bring the remote control.  A legal guardian: Bring a copy of the certified (court-stamped) guardianship papers.  Please remove any jewelry, including body piercings. Leave jewelry and other valuables at home.  If you're going home the day of surgery  You must have a responsible adult drive you home. They should stay with you overnight as well.  If you don't have someone to stay with you, and you aren't safe to go home alone, we may keep you overnight. Insurance often won't pay for this.  After surgery  If it's hard to control your pain or you need more pain medicine, please call your surgeon's office.  Questions?   If you have any questions for your care team, list them here: _________________________________________________________________________________________________________________________________________________________________________ ____________________________________ ____________________________________ ____________________________________    How to Take Your Medication Before Surgery  - can take propanolol day of surgery. Hold all other meds. Hold multivitamin for one week before. Stop Eliquis for 2 days before surgery

## 2023-05-17 ENCOUNTER — HOSPITAL ENCOUNTER (OUTPATIENT)
Facility: CLINIC | Age: 63
Discharge: HOME | End: 2023-05-17
Attending: PODIATRIST | Admitting: PODIATRIST
Payer: COMMERCIAL

## 2023-05-17 ENCOUNTER — APPOINTMENT (OUTPATIENT)
Dept: GENERAL RADIOLOGY | Facility: CLINIC | Age: 63
End: 2023-05-17
Attending: PODIATRIST
Payer: COMMERCIAL

## 2023-05-17 ENCOUNTER — ANESTHESIA (OUTPATIENT)
Dept: SURGERY | Facility: CLINIC | Age: 63
End: 2023-05-17
Payer: COMMERCIAL

## 2023-05-17 ENCOUNTER — ANESTHESIA EVENT (OUTPATIENT)
Dept: SURGERY | Facility: CLINIC | Age: 63
End: 2023-05-17
Payer: COMMERCIAL

## 2023-05-17 VITALS
RESPIRATION RATE: 16 BRPM | HEART RATE: 51 BPM | WEIGHT: 231.3 LBS | OXYGEN SATURATION: 98 % | DIASTOLIC BLOOD PRESSURE: 81 MMHG | TEMPERATURE: 97 F | HEIGHT: 76 IN | BODY MASS INDEX: 28.17 KG/M2 | SYSTOLIC BLOOD PRESSURE: 122 MMHG

## 2023-05-17 DIAGNOSIS — Z98.890 S/P HARDWARE REMOVAL: Primary | ICD-10-CM

## 2023-05-17 PROCEDURE — 999N000179 XR SURGERY CARM FLUORO LESS THAN 5 MIN W STILLS: Mod: TC

## 2023-05-17 PROCEDURE — 250N000011 HC RX IP 250 OP 636: Performed by: NURSE ANESTHETIST, CERTIFIED REGISTERED

## 2023-05-17 PROCEDURE — 999N000141 HC STATISTIC PRE-PROCEDURE NURSING ASSESSMENT: Performed by: PODIATRIST

## 2023-05-17 PROCEDURE — 258N000003 HC RX IP 258 OP 636: Performed by: ANESTHESIOLOGY

## 2023-05-17 PROCEDURE — 710N000012 HC RECOVERY PHASE 2, PER MINUTE: Performed by: PODIATRIST

## 2023-05-17 PROCEDURE — 999N000065 XR ANKLE PORT RIGHT 2 VIEWS: Mod: RT

## 2023-05-17 PROCEDURE — 272N000001 HC OR GENERAL SUPPLY STERILE: Performed by: PODIATRIST

## 2023-05-17 PROCEDURE — 360N000083 HC SURGERY LEVEL 3 W/ FLUORO, PER MIN: Performed by: PODIATRIST

## 2023-05-17 PROCEDURE — 20680 REMOVAL OF IMPLANT DEEP: CPT | Performed by: PODIATRIST

## 2023-05-17 PROCEDURE — 250N000011 HC RX IP 250 OP 636: Performed by: PODIATRIST

## 2023-05-17 PROCEDURE — 250N000025 HC SEVOFLURANE, PER MIN: Performed by: PODIATRIST

## 2023-05-17 PROCEDURE — 710N000009 HC RECOVERY PHASE 1, LEVEL 1, PER MIN: Performed by: PODIATRIST

## 2023-05-17 PROCEDURE — 250N000009 HC RX 250: Performed by: NURSE ANESTHETIST, CERTIFIED REGISTERED

## 2023-05-17 PROCEDURE — 370N000017 HC ANESTHESIA TECHNICAL FEE, PER MIN: Performed by: PODIATRIST

## 2023-05-17 RX ORDER — FENTANYL CITRATE 50 UG/ML
INJECTION, SOLUTION INTRAMUSCULAR; INTRAVENOUS PRN
Status: DISCONTINUED | OUTPATIENT
Start: 2023-05-17 | End: 2023-05-17

## 2023-05-17 RX ORDER — PROPOFOL 10 MG/ML
INJECTION, EMULSION INTRAVENOUS CONTINUOUS PRN
Status: DISCONTINUED | OUTPATIENT
Start: 2023-05-17 | End: 2023-05-17

## 2023-05-17 RX ORDER — SODIUM CHLORIDE, SODIUM LACTATE, POTASSIUM CHLORIDE, CALCIUM CHLORIDE 600; 310; 30; 20 MG/100ML; MG/100ML; MG/100ML; MG/100ML
INJECTION, SOLUTION INTRAVENOUS CONTINUOUS
Status: DISCONTINUED | OUTPATIENT
Start: 2023-05-17 | End: 2023-05-17 | Stop reason: HOSPADM

## 2023-05-17 RX ORDER — CEFAZOLIN SODIUM/WATER 2 G/20 ML
2 SYRINGE (ML) INTRAVENOUS
Status: COMPLETED | OUTPATIENT
Start: 2023-05-17 | End: 2023-05-17

## 2023-05-17 RX ORDER — CEFAZOLIN SODIUM/WATER 2 G/20 ML
2 SYRINGE (ML) INTRAVENOUS SEE ADMIN INSTRUCTIONS
Status: DISCONTINUED | OUTPATIENT
Start: 2023-05-17 | End: 2023-05-17 | Stop reason: HOSPADM

## 2023-05-17 RX ORDER — BUPIVACAINE HYDROCHLORIDE 2.5 MG/ML
INJECTION, SOLUTION EPIDURAL; INFILTRATION; INTRACAUDAL PRN
Status: DISCONTINUED | OUTPATIENT
Start: 2023-05-17 | End: 2023-05-17 | Stop reason: HOSPADM

## 2023-05-17 RX ORDER — GLYCOPYRROLATE 0.2 MG/ML
INJECTION, SOLUTION INTRAMUSCULAR; INTRAVENOUS PRN
Status: DISCONTINUED | OUTPATIENT
Start: 2023-05-17 | End: 2023-05-17

## 2023-05-17 RX ORDER — HYDROMORPHONE HCL IN WATER/PF 6 MG/30 ML
0.2 PATIENT CONTROLLED ANALGESIA SYRINGE INTRAVENOUS EVERY 5 MIN PRN
Status: DISCONTINUED | OUTPATIENT
Start: 2023-05-17 | End: 2023-05-17 | Stop reason: HOSPADM

## 2023-05-17 RX ORDER — HYDROCODONE BITARTRATE AND ACETAMINOPHEN 5; 325 MG/1; MG/1
1 TABLET ORAL
Status: DISCONTINUED | OUTPATIENT
Start: 2023-05-17 | End: 2023-05-17 | Stop reason: HOSPADM

## 2023-05-17 RX ORDER — FENTANYL CITRATE 50 UG/ML
25 INJECTION, SOLUTION INTRAMUSCULAR; INTRAVENOUS EVERY 5 MIN PRN
Status: DISCONTINUED | OUTPATIENT
Start: 2023-05-17 | End: 2023-05-17 | Stop reason: HOSPADM

## 2023-05-17 RX ORDER — CYANOCOBALAMIN (VITAMIN B-12) 500 MCG
400 LOZENGE ORAL DAILY
COMMUNITY
End: 2023-08-18

## 2023-05-17 RX ORDER — HYDROXYZINE HYDROCHLORIDE 25 MG/1
TABLET, FILM COATED ORAL
Qty: 15 TABLET | Refills: 0 | Status: SHIPPED | OUTPATIENT
Start: 2023-05-17 | End: 2023-08-18

## 2023-05-17 RX ORDER — PROPOFOL 10 MG/ML
INJECTION, EMULSION INTRAVENOUS PRN
Status: DISCONTINUED | OUTPATIENT
Start: 2023-05-17 | End: 2023-05-17

## 2023-05-17 RX ORDER — ONDANSETRON 4 MG/1
4 TABLET, ORALLY DISINTEGRATING ORAL EVERY 30 MIN PRN
Status: DISCONTINUED | OUTPATIENT
Start: 2023-05-17 | End: 2023-05-17 | Stop reason: HOSPADM

## 2023-05-17 RX ORDER — FENTANYL CITRATE 50 UG/ML
50 INJECTION, SOLUTION INTRAMUSCULAR; INTRAVENOUS EVERY 5 MIN PRN
Status: DISCONTINUED | OUTPATIENT
Start: 2023-05-17 | End: 2023-05-17 | Stop reason: HOSPADM

## 2023-05-17 RX ORDER — HYDROMORPHONE HCL IN WATER/PF 6 MG/30 ML
0.4 PATIENT CONTROLLED ANALGESIA SYRINGE INTRAVENOUS EVERY 5 MIN PRN
Status: DISCONTINUED | OUTPATIENT
Start: 2023-05-17 | End: 2023-05-17 | Stop reason: HOSPADM

## 2023-05-17 RX ORDER — ONDANSETRON 2 MG/ML
INJECTION INTRAMUSCULAR; INTRAVENOUS PRN
Status: DISCONTINUED | OUTPATIENT
Start: 2023-05-17 | End: 2023-05-17

## 2023-05-17 RX ORDER — HYDROCODONE BITARTRATE AND ACETAMINOPHEN 5; 325 MG/1; MG/1
TABLET ORAL
Qty: 15 TABLET | Refills: 0 | Status: SHIPPED | OUTPATIENT
Start: 2023-05-17 | End: 2023-08-18

## 2023-05-17 RX ORDER — LIDOCAINE 40 MG/G
CREAM TOPICAL
Status: DISCONTINUED | OUTPATIENT
Start: 2023-05-17 | End: 2023-05-17 | Stop reason: HOSPADM

## 2023-05-17 RX ORDER — DEXAMETHASONE SODIUM PHOSPHATE 4 MG/ML
INJECTION, SOLUTION INTRA-ARTICULAR; INTRALESIONAL; INTRAMUSCULAR; INTRAVENOUS; SOFT TISSUE PRN
Status: DISCONTINUED | OUTPATIENT
Start: 2023-05-17 | End: 2023-05-17

## 2023-05-17 RX ORDER — ONDANSETRON 2 MG/ML
4 INJECTION INTRAMUSCULAR; INTRAVENOUS EVERY 30 MIN PRN
Status: DISCONTINUED | OUTPATIENT
Start: 2023-05-17 | End: 2023-05-17 | Stop reason: HOSPADM

## 2023-05-17 RX ORDER — LIDOCAINE HYDROCHLORIDE 20 MG/ML
INJECTION, SOLUTION INFILTRATION; PERINEURAL PRN
Status: DISCONTINUED | OUTPATIENT
Start: 2023-05-17 | End: 2023-05-17

## 2023-05-17 RX ADMIN — Medication 2 G: at 07:21

## 2023-05-17 RX ADMIN — DEXAMETHASONE SODIUM PHOSPHATE 4 MG: 4 INJECTION, SOLUTION INTRA-ARTICULAR; INTRALESIONAL; INTRAMUSCULAR; INTRAVENOUS; SOFT TISSUE at 07:31

## 2023-05-17 RX ADMIN — ONDANSETRON 4 MG: 2 INJECTION INTRAMUSCULAR; INTRAVENOUS at 07:44

## 2023-05-17 RX ADMIN — GLYCOPYRROLATE 0.2 MG: 0.2 INJECTION, SOLUTION INTRAMUSCULAR; INTRAVENOUS at 07:31

## 2023-05-17 RX ADMIN — FENTANYL CITRATE 100 MCG: 50 INJECTION, SOLUTION INTRAMUSCULAR; INTRAVENOUS at 07:31

## 2023-05-17 RX ADMIN — MIDAZOLAM 2 MG: 1 INJECTION INTRAMUSCULAR; INTRAVENOUS at 07:24

## 2023-05-17 RX ADMIN — LIDOCAINE HYDROCHLORIDE 30 MG: 20 INJECTION, SOLUTION INFILTRATION; PERINEURAL at 07:31

## 2023-05-17 RX ADMIN — PROPOFOL 200 MG: 10 INJECTION, EMULSION INTRAVENOUS at 07:31

## 2023-05-17 RX ADMIN — PROPOFOL 75 MCG/KG/MIN: 10 INJECTION, EMULSION INTRAVENOUS at 07:33

## 2023-05-17 RX ADMIN — SODIUM CHLORIDE, POTASSIUM CHLORIDE, SODIUM LACTATE AND CALCIUM CHLORIDE: 600; 310; 30; 20 INJECTION, SOLUTION INTRAVENOUS at 06:26

## 2023-05-17 ASSESSMENT — ENCOUNTER SYMPTOMS: DYSRHYTHMIAS: 1

## 2023-05-17 ASSESSMENT — ACTIVITIES OF DAILY LIVING (ADL)
ADLS_ACUITY_SCORE: 35
ADLS_ACUITY_SCORE: 35

## 2023-05-17 NOTE — ANESTHESIA POSTPROCEDURE EVALUATION
Patient: John Patel    Procedure: Procedure(s):  Hardware removal right ankle       Anesthesia Type:  General    Note:  Disposition: Outpatient   Postop Pain Control: Uneventful            Sign Out: Well controlled pain   PONV: No   Neuro/Psych: Uneventful            Sign Out: Acceptable/Baseline neuro status   Airway/Respiratory: Uneventful            Sign Out: Acceptable/Baseline resp. status   CV/Hemodynamics: Uneventful            Sign Out: Acceptable CV status; No obvious hypovolemia; No obvious fluid overload   Other NRE: NONE   DID A NON-ROUTINE EVENT OCCUR? No           Last vitals:  Vitals Value Taken Time   /75 05/17/23 0840   Temp 96.4  F (35.8  C) 05/17/23 0839   Pulse 54 05/17/23 0840   Resp 15 05/17/23 0839   SpO2 95 % 05/17/23 0840   Vitals shown include unvalidated device data.    Electronically Signed By: Amado Meza MD  May 17, 2023  1:54 PM

## 2023-05-17 NOTE — DISCHARGE INSTRUCTIONS
GENERAL ANESTHESIA ADULT DISCHARGE INSTRUCTIONS   SPECIAL PRECAUTIONS FOR 24 HOURS AFTER SURGERY    IT IS NOT UNUSUAL TO FEEL LIGHT-HEADED OR FAINT, UP TO 24 HOURS AFTER SURGERY OR WHILE TAKING PAIN MEDICATION.  IF YOU HAVE THESE SYMPTOMS; SIT FOR A FEW MINUTES BEFORE STANDING AND HAVE SOMEONE ASSIST YOU WHEN YOU GET UP TO WALK OR USE THE BATHROOM.    YOU SHOULD REST AND RELAX FOR THE NEXT 24 HOURS AND YOU MUST MAKE ARRANGEMENTS TO HAVE SOMEONE STAY WITH YOU FOR AT LEAST 24 HOURS AFTER YOUR DISCHARGE.  AVOID HAZARDOUS AND STRENUOUS ACTIVITIES.  DO NOT MAKE IMPORTANT DECISIONS FOR 24 HOURS.    DO NOT DRIVE ANY VEHICLE OR OPERATE MECHANICAL EQUIPMENT FOR 24 HOURS FOLLOWING THE END OF YOUR SURGERY.  EVEN THOUGH YOU MAY FEEL NORMAL, YOUR REACTIONS MAY BE AFFECTED BY THE MEDICATION YOU HAVE RECEIVED.    DO NOT DRINK ALCOHOLIC BEVERAGES FOR 24 HOURS FOLLOWING YOUR SURGERY.    DRINK CLEAR LIQUIDS (APPLE JUICE, GINGER ALE, 7-UP, BROTH, ETC.).  PROGRESS TO YOUR REGULAR DIET AS YOU FEEL ABLE.    YOU MAY HAVE A DRY MOUTH, A SORE THROAT, MUSCLES ACHES OR TROUBLE SLEEPING.  THESE SHOULD GO AWAY AFTER 24 HOURS.    CALL YOUR DOCTOR FOR ANY OF THE FOLLOWING:  SIGNS OF INFECTION (FEVER, GROWING TENDERNESS AT THE SURGERY SITE, A LARGE AMOUNT OF DRAINAGE OR BLEEDING, SEVERE PAIN, FOUL-SMELLING DRAINAGE, REDNESS OR SWELLING.    IT HAS BEEN OVER 8 TO 10 HOURS SINCE SURGERY AND YOU ARE STILL NOT ABLE TO URINATE (PASS WATER).     Maximum acetaminophen (Tylenol) dose from all sources should not exceed 4 grams (4000 mg) per day.  Maximum Ibuprofin (Motrin) dose from all sources should not exceed 3 grams (3000 mg) per day.

## 2023-05-17 NOTE — ANESTHESIA PROCEDURE NOTES
Airway       Patient location during procedure: OR  Staff -        Anesthesiologist:  Amado Meza MD       CRNA: Severson, Dean Dennis, APRN CRNA       Performed By: CRNA  Consent for Airway        Urgency: elective  Indications and Patient Condition       Indications for airway management: betzaida-procedural and airway protection       Induction type:intravenous       Mask difficulty assessment: 1 - vent by mask    Final Airway Details       Final airway type: supraglottic airway    Supraglottic Airway Details        Type: LMA       Brand: I-Gel       LMA size: 5    Post intubation assessment        Placement verified by: capnometry, equal breath sounds and chest rise        Number of attempts at approach: 1       Number of other approaches attempted: 0       Secured with: commercial tube lomas       Ease of procedure: easy       Dentition: Intact and Unchanged

## 2023-05-17 NOTE — ANESTHESIA CARE TRANSFER NOTE
Patient: John Patel    Procedure: Procedure(s):  Hardware removal right ankle       Diagnosis: Painful orthopaedic hardware (H) [T84.84XA]  Diagnosis Additional Information: No value filed.    Anesthesia Type:   General     Note:    Oropharynx: oropharynx clear of all foreign objects  Level of Consciousness: drowsy  Oxygen Supplementation: face mask  Level of Supplemental Oxygen (L/min / FiO2): 10  Independent Airway: airway patency satisfactory and stable  Dentition: dentition unchanged    Report to RN Given: handoff report given  Patient transferred to: PACU    Handoff Report: Identifed the Patient, Identified the Reponsible Provider, Reviewed the pertinent medical history, Discussed the surgical course, Reviewed Intra-OP anesthesia mangement and issues during anesthesia, Set expectations for post-procedure period and Allowed opportunity for questions and acknowledgement of understanding      Vitals:  Vitals Value Taken Time   /81 05/17/23 0820   Temp 96.7  F (35.9  C) 05/17/23 0820   Pulse 53 05/17/23 0820   Resp 19 05/17/23 0820   SpO2 100 % 05/17/23 0820   Vitals shown include unvalidated device data.    Electronically Signed By: Dean Dennis Severson, APRN CRNA  May 17, 2023  8:21 AM

## 2023-05-17 NOTE — OP NOTE
Procedure Date: 05/17/2023    SURGEON:  Ariel Marquez DPM    PREOPERATIVE DIAGNOSES:    1.  Painful hardware, including 2 screws, right medial malleolus.  2.  Previous right ankle fracture, status post open reduction internal fixation (ORIF) at an outside facility in 08/2022.    POSTOPERATIVE DIAGNOSES:    1.  Painful hardware, including 2 screws, right medial malleolus.  2.  Previous right ankle fracture, status post open reduction internal fixation (ORIF) at an outside facility in 08/2022.    PROCEDURE:  Removal of deep hardware x2, right ankle.    ANESTHESIA:  General endotracheal.    HEMOSTASIS:  None.    ESTIMATED BLOOD LOSS:  Less than 5 mL.    MATERIALS:  None.    INJECTABLES:  16 mL of 0.25% bupivacaine plain preoperatively.    COMPLICATIONS:  None apparent.    INDICATIONS FOR PROCEDURE:  The patient is a pleasant, 63-year-old male who was referred to me for assessment of painful hardware.  He underwent ORIF of a right medial malleolus fracture including 2 screws at an outside facility in [place], [place] in 08/2022.  X-rays demonstrated unremarkable hardware and a healed medial malleolus fracture.  We discussed removal and he wished to proceed.    DESCRIPTION OF PROCEDURE:  After obtaining written consent, the patient was transferred to the operating room and placed in the supine position on the operating table.  He was placed under general anesthesia and the right lower extremity was anesthetized with a preoperative local.  It was then prepped and draped in normal aseptic fashion aseptic fashion.  No tourniquet was utilized.  The patient, procedure, and site were correctly identified by OR staff.    PROCEDURE #1:  Attention was directed to the medial right ankle where pertinent anatomy was drawn out.  A 4 cm linear incision was carried down through the previous surgical site.  Bleeding vessels were electrocauterized as necessary.  Blunt dissection was used to carry the incision down to the deep  fascia and the soft tissue was reflected off of the distal aspect of the medial malleolus with minimal disruption of the deltoid ligament.  The posterior screw was identified.  There was bony ingrowth around this which had to be resected with a rongeur.  The screw was then backed out uneventfully.  The anterior screw was then identified and, again, bone and adjacent soft tissue were resected with a rongeur.  The screw was then backed out uneventfully.  No acute pathology was otherwise identified.      The wound was flushed with copious amounts of normal saline.  Layered closure was performed with 3-0 Vicryl and 4-0 nylon.    A dry sterile dressing was applied to the patient's right lower extremity.  He appeared to tolerate the procedure and anesthesia well and was transferred to the PACU with vital signs stable and vascular status intact.  The patient will be weightbearing as tolerated and will follow up with me in clinic in approximately 1 week or sooner if any acute issues.    Ariel Marquez DPM        D: 2023   T: 2023   MT: tamar    Name:     JEREMÍAS ABDULLAHI  MRN:      [id number]        Account:        [id number]   :      1960           Procedure Date: 2023     Document: [id number]

## 2023-05-17 NOTE — ANESTHESIA PREPROCEDURE EVALUATION
Anesthesia Pre-Procedure Evaluation    Patient: John Patel   MRN: [id number] : 1960        Procedure : Procedure(s):  Hardware removal right ankle          Past Medical History:   Diagnosis Date     Acute lumbar radiculopathy      Allergic rhinitis, cause unspecified      Antiplatelet or antithrombotic long-term use     Eliquis     Atrial fibrillation (H)      Cardiomyopathy (H)      Colon polyp     pre-cancerous and non-cancerous     Hypertension     [facility]     Lumbar disc herniation     Responded to conservative mgmt     Snores      Tremor     both hands      Past Surgical History:   Procedure Laterality Date     ANESTHESIA CARDIOVERSION N/A 2015    Procedure: ANESTHESIA CARDIOVERSION;  Surgeon: GENERIC ANESTHESIA PROVIDER;  Location: [facility]     ANKLE SURGERY Right      CARDIAC SURGERY  2017    Ablation     COLONOSCOPY N/A 11/10/2015    No polyps. Sigmoid diverticulosis. Repeat in 5 years. Procedure: COLONOSCOPY;  Surgeon: Edin Wallace MD, MD;  Location:  GI     COLONOSCOPY  2020    One adenomatous polyp (2 hyperplastic polyps). Repeat in 5 yrs.     HERNIORRHAPHY INGUINAL  2011    Procedure:HERNIORRHAPHY INGUINAL; LEFT INGUINAL HERNIA REPAIR WITH MESH; Surgeon:SYLVESTER KOEHLER; Location:[facility]     HERNIORRHAPHY INGUINAL Right 2016    Procedure: HERNIORRHAPHY INGUINAL;  Surgeon: Sixto Wolf MD;  Location:  OR     ORTHOPEDIC SURGERY  ~    bunionectomy on right      Allergies   Allergen Reactions     No Known Drug Allergy       Social History     Tobacco Use     Smoking status: Never     Smokeless tobacco: Never   Vaping Use     Vaping status: Never Used   Substance Use Topics     Alcohol use: Yes     Comment: 1 drink weekly      Wt Readings from Last 1 Encounters:   23 104.9 kg (231 lb 4.8 oz)        Anesthesia Evaluation   Pt has had prior anesthetic. Type: General.    No history of anesthetic complications       ROS/MED  HX  ENT/Pulmonary:  - neg pulmonary ROS     Neurologic:  - neg neurologic ROS     Cardiovascular:     (+) hypertension-----Taking blood thinners dysrhythmias, a-fib,     METS/Exercise Tolerance:     Hematologic:  - neg hematologic  ROS     Musculoskeletal:   (+) arthritis,     GI/Hepatic:  - neg GI/hepatic ROS     Renal/Genitourinary:  - neg Renal ROS     Endo:  - neg endo ROS     Psychiatric/Substance Use:  - neg psychiatric ROS     Infectious Disease:  - neg infectious disease ROS     Malignancy:       Other:            Physical Exam    Airway        Mallampati: III   TM distance: > 3 FB   Neck ROM: full   Mouth opening: > 3 cm    Respiratory Devices and Support         Dental       (+) Modest Abnormalities - crowns, retainers, 1 or 2 missing teeth      Cardiovascular   cardiovascular exam normal          Pulmonary   pulmonary exam normal                OUTSIDE LABS:  CBC:   Lab Results   Component Value Date    WBC 4.2 08/16/2022    WBC 5.2 08/11/2021    HGB 14.9 05/10/2023    HGB 15.0 08/16/2022    HCT 44.8 08/16/2022    HCT 46.1 08/11/2021     08/16/2022     08/11/2021     BMP:   Lab Results   Component Value Date     08/16/2022     08/11/2021    POTASSIUM 4.8 05/10/2023    POTASSIUM 4.4 08/16/2022    CHLORIDE 109 08/16/2022    CHLORIDE 109 08/11/2021    CO2 29 08/16/2022    CO2 30 08/11/2021    BUN 17 08/16/2022    BUN 19 08/11/2021    CR 1.18 (H) 05/10/2023    CR 0.97 08/16/2022    GLC 98 08/16/2022     (H) 08/11/2021     COAGS: No results found for: PTT, INR, FIBR  POC: No results found for: BGM, HCG, HCGS  HEPATIC:   Lab Results   Component Value Date    ALBUMIN 3.6 08/16/2022    PROTTOTAL 6.5 (L) 08/16/2022    ALT 24 08/16/2022    AST 25 08/16/2022    ALKPHOS 62 08/16/2022    BILITOTAL 0.8 08/16/2022     OTHER:   Lab Results   Component Value Date    BENSON 8.7 08/16/2022    TSH 1.22 08/16/2022       Anesthesia Plan    ASA Status:  3      Anesthesia Type: General.     -  Airway: LMA   Induction: Intravenous.   Maintenance: Balanced.        Consents    Anesthesia Plan(s) and associated risks, benefits, and realistic alternatives discussed. Questions answered and patient/representative(s) expressed understanding.    - Discussed:     - Discussed with:  Patient      - Extended Intubation/Ventilatory Support Discussed: No.      - Patient is DNR/DNI Status: No    Use of blood products discussed: No .     Postoperative Care    Pain management: IV analgesics, Oral pain medications, Multi-modal analgesia.   PONV prophylaxis: Ondansetron (or other 5HT-3), Dexamethasone or Solumedrol     Comments:                Amado Meza MD

## 2023-05-17 NOTE — BRIEF OP NOTE
[facility]    Brief Operative Note    Pre-operative diagnosis: Painful orthopaedic hardware (H) [T84.84XA]  Post-operative diagnosis sp hardware removal deep x 2 right lower extremity    Procedure: Procedure(s):  Hardware removal right ankle  Surgeon: Surgeon(s) and Role:     * Ariel Marquez DPM - Primary  Anesthesia: General   Estimated Blood Loss: Minimal    Drains: None  Specimens: * No specimens in log *  Findings:   unremarkable removal of hardware right ankle; no acute pathology identified.  Complications: None.  Implants:   Implant Name Type Inv. Item Serial No.  Lot No. LRB No. Used Action   ankle hardware, screws      Right 2 Explanted

## 2023-05-25 ENCOUNTER — OFFICE VISIT (OUTPATIENT)
Dept: PODIATRY | Facility: CLINIC | Age: 63
End: 2023-05-25
Payer: COMMERCIAL

## 2023-05-25 DIAGNOSIS — M21.612 BUNION OF LEFT FOOT: ICD-10-CM

## 2023-05-25 DIAGNOSIS — Z98.890 S/P FOOT SURGERY, RIGHT: Primary | ICD-10-CM

## 2023-05-25 PROCEDURE — 99024 POSTOP FOLLOW-UP VISIT: CPT | Performed by: PODIATRIST

## 2023-05-25 PROCEDURE — 99213 OFFICE O/P EST LOW 20 MIN: CPT | Mod: 24 | Performed by: PODIATRIST

## 2023-05-25 RX ORDER — CEPHALEXIN 500 MG/1
500 CAPSULE ORAL 2 TIMES DAILY
Qty: 14 CAPSULE | Refills: 0 | Status: SHIPPED | OUTPATIENT
Start: 2023-05-25 | End: 2023-06-01

## 2023-05-25 NOTE — PROGRESS NOTES
Foot & Ankle Surgery  May 25, 2023    S:  Patient in today sp removal of painful hardware right ankle/medial malleolus on 5/17/2023.  Pain levels low.  He is using a rollabout today but states he has ambulated on the foot in the boot.  He has questions about his left bunion.  He states the bump itself is not entirely problematic, but he does have increasing pain with the hallux rubs on the second toe.  He had his right bunion previously fixed.    There were no vitals taken for this visit.      ROS - positive for CC.  Patient denies current nausea, vomiting, chills, fevers, belly pain, calf pain, chest pain or SOB.  Complete remainder of ROS is otherwise neg.    PE -right ankle shows intact screws.  There is mild erythema along the incision without drainage, necrosis or dehiscence.  Prominent left bunion, partially reducible.  The joint is track bound but no pain or crepitus is noted..  Skin shows no trophic, color or temperature changes otherwise.  No calf redness, swelling or pain noted otherwise.    Imaging - IMPRESSION: Interval removal of the fixation screws within the medial  malleolus. There is some soft tissue swelling over the medial  malleolus. There is no evidence for acute fracture or disruption of  the ankle mortise.      A/P - 63 year old yo patient approx 1 week sp above procedure  - we reviewed the procedure and intraoperative findings  - clinically, the ankle is doing quite well  -The patient was given a prescription for Keflex 500 mg twice daily x7 days for low-grade betzaida-incisional erythema.  -Continue all postoperative instructions including weightbearing as tolerated, resting/elevating/icing; he will follow-up in 1 week for reassessment and likely suture removal    2.  Left bunion  -Conservatively, we discussed accommodative shoes and pads/spacer/splinting options  -If this fails to alleviate symptoms, we discussed surgical intervention would be appropriate.  We discussed the option for a Lapidus  Visit Vitals  /78 (BP 1 Location: Left arm, BP Patient Position: Sitting)   Pulse 69   Resp 17   Ht 5' 3\" (1.6 m)   Wt 152 lb (68.9 kg)   SpO2 99%   BMI 26.93 kg/m² bunionectomy versus a first MPJ fusion.  The patient states there is minimal joint pain and so a Lapidus I think would be most appropriate.  -We reviewed a generic postop recovery for a Lapidus bunionectomy.  He states he has a trip in December and would consider bunion surgery prior to that if necessary.  However, if symptoms are low-grade, we also discussed the option for simply monitoring for now.  X-rays were ordered but they were canceled as the patient will consider his options.    Follow up  -  1 week or sooner with acute issues    Plan for [id number] - currently working       Ariel Marquez DPM FACFAS FACFAOM  Podiatric Foot & Ankle Surgeon  [facility]  [phone]    Disclaimer: This note consists of symbols derived from keyboarding, dictation and/or voice recognition software. As a result, there may be errors in the script that have gone undetected. Please consider this when interpreting information found in this chart.

## 2023-05-26 ENCOUNTER — DOCUMENTATION ONLY (OUTPATIENT)
Dept: OTHER | Facility: CLINIC | Age: 63
End: 2023-05-26
Payer: COMMERCIAL

## 2023-06-01 ENCOUNTER — OFFICE VISIT (OUTPATIENT)
Dept: PODIATRY | Facility: CLINIC | Age: 63
End: 2023-06-01
Payer: COMMERCIAL

## 2023-06-01 VITALS — SYSTOLIC BLOOD PRESSURE: 115 MMHG | DIASTOLIC BLOOD PRESSURE: 82 MMHG

## 2023-06-01 DIAGNOSIS — T84.84XA PAINFUL ORTHOPAEDIC HARDWARE (H): Primary | ICD-10-CM

## 2023-06-01 DIAGNOSIS — Z98.890 S/P FOOT SURGERY, RIGHT: ICD-10-CM

## 2023-06-01 PROCEDURE — 99024 POSTOP FOLLOW-UP VISIT: CPT | Performed by: PODIATRIST

## 2023-06-01 NOTE — PROGRESS NOTES
"Foot & Ankle Surgery  June 1, 2023    S:  Patient in today sp hardware removal right ankle on 5/17/23.  Pain levels low.  Noticed some \"fuzziness\" in the big toe as he recently has increased his activities, including doing planks.  Otherwise, the right ankle is doing well.  His antibiotic just arrived recently and he has had about 4 doses    /82       ROS - positive for CC.  Patient denies current nausea, vomiting, chills, fevers, belly pain, calf pain, chest pain or SOB.  Complete remainder of ROS is otherwise neg.    PE - sutures removed, incision healing well.  Minimal erythema, but mild edema, although wnl for this stage post-op  Skin shows no trophic, color or temperature changes otherwise.  No calf redness, swelling or pain noted otherwise.    A/P - 63 year old yo patient approx 2 weeks sp above procedure  -all sutures removed, no s-s needed  -gradual return to shoes/activities as tolerated, RICE/NSAID vs tylenol as needed based on pain  -tensogrip compression sleeve for swelling control    Follow up  -  4 weeks(ok to cancel if otherwise doing well) or sooner with acute issues        Ariel Marquez DPM FACFAS FACFAOM  Podiatric Foot & Ankle Surgeon  [facility]  [phone]    Disclaimer: This note consists of symbols derived from keyboarding, dictation and/or voice recognition software. As a result, there may be errors in the script that have gone undetected. Please consider this when interpreting information found in this chart.      "

## 2023-08-17 ASSESSMENT — ENCOUNTER SYMPTOMS
NAUSEA: 0
ARTHRALGIAS: 0
SORE THROAT: 0
ABDOMINAL PAIN: 0
WEAKNESS: 0
FREQUENCY: 0
DIARRHEA: 0
HEARTBURN: 0
COUGH: 0
CHILLS: 0
DYSURIA: 0
NERVOUS/ANXIOUS: 0
HEMATURIA: 0
HEMATOCHEZIA: 0
DIZZINESS: 0
MYALGIAS: 0
CONSTIPATION: 0
PARESTHESIAS: 0
JOINT SWELLING: 0
SHORTNESS OF BREATH: 0
FEVER: 0
PALPITATIONS: 0
EYE PAIN: 0
HEADACHES: 0

## 2023-08-18 ENCOUNTER — OFFICE VISIT (OUTPATIENT)
Dept: INTERNAL MEDICINE | Facility: CLINIC | Age: 63
End: 2023-08-18
Payer: COMMERCIAL

## 2023-08-18 VITALS
BODY MASS INDEX: 26.33 KG/M2 | WEIGHT: 223 LBS | DIASTOLIC BLOOD PRESSURE: 66 MMHG | HEIGHT: 77 IN | SYSTOLIC BLOOD PRESSURE: 107 MMHG | HEART RATE: 55 BPM | RESPIRATION RATE: 18 BRPM | OXYGEN SATURATION: 99 % | TEMPERATURE: 98.1 F

## 2023-08-18 DIAGNOSIS — I48.91 ATRIAL FIBRILLATION, UNSPECIFIED TYPE (H): ICD-10-CM

## 2023-08-18 DIAGNOSIS — G25.0 BENIGN ESSENTIAL TREMOR: ICD-10-CM

## 2023-08-18 DIAGNOSIS — Z79.899 MEDICATION MANAGEMENT: ICD-10-CM

## 2023-08-18 DIAGNOSIS — Z12.5 SPECIAL SCREENING FOR MALIGNANT NEOPLASM OF PROSTATE: ICD-10-CM

## 2023-08-18 DIAGNOSIS — Z79.01 LONG TERM CURRENT USE OF ANTICOAGULANT THERAPY: ICD-10-CM

## 2023-08-18 DIAGNOSIS — I42.8 NONISCHEMIC CARDIOMYOPATHY (H): ICD-10-CM

## 2023-08-18 DIAGNOSIS — Z00.00 ROUTINE GENERAL MEDICAL EXAMINATION AT A HEALTH CARE FACILITY: Primary | ICD-10-CM

## 2023-08-18 DIAGNOSIS — Z13.6 CARDIOVASCULAR SCREENING; LDL GOAL LESS THAN 160: ICD-10-CM

## 2023-08-18 DIAGNOSIS — R25.1 TREMOR: ICD-10-CM

## 2023-08-18 LAB
ALBUMIN SERPL BCG-MCNC: 4.5 G/DL (ref 3.5–5.2)
ALP SERPL-CCNC: 78 U/L (ref 40–129)
ALT SERPL W P-5'-P-CCNC: 20 U/L (ref 0–70)
ANION GAP SERPL CALCULATED.3IONS-SCNC: 10 MMOL/L (ref 7–15)
AST SERPL W P-5'-P-CCNC: 32 U/L (ref 0–45)
BASOPHILS # BLD AUTO: 0 10E3/UL (ref 0–0.2)
BASOPHILS NFR BLD AUTO: 1 %
BILIRUB SERPL-MCNC: 0.8 MG/DL
BUN SERPL-MCNC: 20.6 MG/DL (ref 8–23)
CALCIUM SERPL-MCNC: 9.5 MG/DL (ref 8.8–10.2)
CHLORIDE SERPL-SCNC: 105 MMOL/L (ref 98–107)
CHOLEST SERPL-MCNC: 167 MG/DL
CREAT SERPL-MCNC: 1.1 MG/DL (ref 0.67–1.17)
DEPRECATED HCO3 PLAS-SCNC: 27 MMOL/L (ref 22–29)
EOSINOPHIL # BLD AUTO: 0.2 10E3/UL (ref 0–0.7)
EOSINOPHIL NFR BLD AUTO: 5 %
ERYTHROCYTE [DISTWIDTH] IN BLOOD BY AUTOMATED COUNT: 11.8 % (ref 10–15)
GFR SERPL CREATININE-BSD FRML MDRD: 76 ML/MIN/1.73M2
GLUCOSE SERPL-MCNC: 103 MG/DL (ref 70–99)
HCT VFR BLD AUTO: 45.3 % (ref 40–53)
HDLC SERPL-MCNC: 46 MG/DL
HGB BLD-MCNC: 15.4 G/DL (ref 13.3–17.7)
IMM GRANULOCYTES # BLD: 0 10E3/UL
IMM GRANULOCYTES NFR BLD: 0 %
LDLC SERPL CALC-MCNC: 108 MG/DL
LYMPHOCYTES # BLD AUTO: 1.6 10E3/UL (ref 0.8–5.3)
LYMPHOCYTES NFR BLD AUTO: 34 %
MCH RBC QN AUTO: 31 PG (ref 26.5–33)
MCHC RBC AUTO-ENTMCNC: 34 G/DL (ref 31.5–36.5)
MCV RBC AUTO: 91 FL (ref 78–100)
MONOCYTES # BLD AUTO: 0.5 10E3/UL (ref 0–1.3)
MONOCYTES NFR BLD AUTO: 10 %
NEUTROPHILS # BLD AUTO: 2.4 10E3/UL (ref 1.6–8.3)
NEUTROPHILS NFR BLD AUTO: 51 %
NONHDLC SERPL-MCNC: 121 MG/DL
PLATELET # BLD AUTO: 247 10E3/UL (ref 150–450)
POTASSIUM SERPL-SCNC: 4.8 MMOL/L (ref 3.4–5.3)
PROT SERPL-MCNC: 6.9 G/DL (ref 6.4–8.3)
PSA SERPL DL<=0.01 NG/ML-MCNC: 0.59 NG/ML (ref 0–4.5)
RBC # BLD AUTO: 4.97 10E6/UL (ref 4.4–5.9)
SODIUM SERPL-SCNC: 142 MMOL/L (ref 136–145)
TRIGL SERPL-MCNC: 67 MG/DL
TSH SERPL DL<=0.005 MIU/L-ACNC: 1.63 UIU/ML (ref 0.3–4.2)
WBC # BLD AUTO: 4.7 10E3/UL (ref 4–11)

## 2023-08-18 PROCEDURE — 80053 COMPREHEN METABOLIC PANEL: CPT | Performed by: INTERNAL MEDICINE

## 2023-08-18 PROCEDURE — 99213 OFFICE O/P EST LOW 20 MIN: CPT | Mod: 25 | Performed by: INTERNAL MEDICINE

## 2023-08-18 PROCEDURE — 85025 COMPLETE CBC W/AUTO DIFF WBC: CPT | Performed by: INTERNAL MEDICINE

## 2023-08-18 PROCEDURE — 84443 ASSAY THYROID STIM HORMONE: CPT | Performed by: INTERNAL MEDICINE

## 2023-08-18 PROCEDURE — 99396 PREV VISIT EST AGE 40-64: CPT | Performed by: INTERNAL MEDICINE

## 2023-08-18 PROCEDURE — G0103 PSA SCREENING: HCPCS | Performed by: INTERNAL MEDICINE

## 2023-08-18 PROCEDURE — 80061 LIPID PANEL: CPT | Performed by: INTERNAL MEDICINE

## 2023-08-18 PROCEDURE — 36415 COLL VENOUS BLD VENIPUNCTURE: CPT | Performed by: INTERNAL MEDICINE

## 2023-08-18 RX ORDER — PROPRANOLOL HYDROCHLORIDE 20 MG/1
TABLET ORAL
Qty: 180 TABLET | Refills: 3 | Status: SHIPPED | OUTPATIENT
Start: 2023-08-18 | End: 2024-08-20

## 2023-08-18 RX ORDER — LISINOPRIL 2.5 MG/1
2.5 TABLET ORAL DAILY
Qty: 90 TABLET | Refills: 3 | Status: SHIPPED | OUTPATIENT
Start: 2023-08-18 | End: 2024-08-20

## 2023-08-18 SDOH — HEALTH STABILITY: PHYSICAL HEALTH: ON AVERAGE, HOW MANY DAYS PER WEEK DO YOU ENGAGE IN MODERATE TO STRENUOUS EXERCISE (LIKE A BRISK WALK)?: 7 DAYS

## 2023-08-18 SDOH — HEALTH STABILITY: PHYSICAL HEALTH: ON AVERAGE, HOW MANY MINUTES DO YOU ENGAGE IN EXERCISE AT THIS LEVEL?: 40 MIN

## 2023-08-18 SDOH — ECONOMIC STABILITY: INCOME INSECURITY: IN THE LAST 12 MONTHS, WAS THERE A TIME WHEN YOU WERE NOT ABLE TO PAY THE MORTGAGE OR RENT ON TIME?: NO

## 2023-08-18 SDOH — ECONOMIC STABILITY: FOOD INSECURITY: WITHIN THE PAST 12 MONTHS, THE FOOD YOU BOUGHT JUST DIDN'T LAST AND YOU DIDN'T HAVE MONEY TO GET MORE.: NEVER TRUE

## 2023-08-18 SDOH — ECONOMIC STABILITY: FOOD INSECURITY: WITHIN THE PAST 12 MONTHS, YOU WORRIED THAT YOUR FOOD WOULD RUN OUT BEFORE YOU GOT MONEY TO BUY MORE.: NEVER TRUE

## 2023-08-18 ASSESSMENT — ENCOUNTER SYMPTOMS
DIZZINESS: 0
CONSTIPATION: 0
FEVER: 0
FREQUENCY: 0
EYE PAIN: 0
WEAKNESS: 0
MYALGIAS: 0
HEADACHES: 0
NERVOUS/ANXIOUS: 0
PARESTHESIAS: 0
CHILLS: 0
HEMATURIA: 0
ARTHRALGIAS: 0
DYSURIA: 0
SORE THROAT: 0
ABDOMINAL PAIN: 0
HEMATOCHEZIA: 0
SHORTNESS OF BREATH: 0
NAUSEA: 0
JOINT SWELLING: 0
DIARRHEA: 0
COUGH: 0
HEARTBURN: 0
PALPITATIONS: 0

## 2023-08-18 ASSESSMENT — SOCIAL DETERMINANTS OF HEALTH (SDOH)
WITHIN THE LAST YEAR, HAVE YOU BEEN AFRAID OF YOUR PARTNER OR EX-PARTNER?: NO
WITHIN THE LAST YEAR, HAVE YOU BEEN HUMILIATED OR EMOTIONALLY ABUSED IN OTHER WAYS BY YOUR PARTNER OR EX-PARTNER?: NO
WITHIN THE LAST YEAR, HAVE TO BEEN RAPED OR FORCED TO HAVE ANY KIND OF SEXUAL ACTIVITY BY YOUR PARTNER OR EX-PARTNER?: NO
HOW HARD IS IT FOR YOU TO PAY FOR THE VERY BASICS LIKE FOOD, HOUSING, MEDICAL CARE, AND HEATING?: NOT HARD AT ALL
WITHIN THE LAST YEAR, HAVE YOU BEEN KICKED, HIT, SLAPPED, OR OTHERWISE PHYSICALLY HURT BY YOUR PARTNER OR EX-PARTNER?: NO

## 2023-08-18 ASSESSMENT — LIFESTYLE VARIABLES
HOW MANY STANDARD DRINKS CONTAINING ALCOHOL DO YOU HAVE ON A TYPICAL DAY: 1 OR 2
HOW OFTEN DO YOU HAVE SIX OR MORE DRINKS ON ONE OCCASION: NEVER
AUDIT-C TOTAL SCORE: 2
HOW OFTEN DO YOU HAVE A DRINK CONTAINING ALCOHOL: 2-4 TIMES A MONTH
SKIP TO QUESTIONS 9-10: 1

## 2023-08-18 NOTE — PROGRESS NOTES
"SUBJECTIVE:   CC: Hilton is an 63 year old who presents for preventative health visit.           8/18/2023     8:16 AM   Additional Questions   Roomed by Edelmira MERRILL CMA       Healthy Habits:     Getting at least 3 servings of Calcium per day:  Yes    Bi-annual eye exam:  Yes    Dental care twice a year:  Yes    Sleep apnea or symptoms of sleep apnea:  None    Diet:  Regular (no restrictions)    Frequency of exercise:  4-5 days/week    Duration of exercise:  30-45 minutes    Taking medications regularly:  Yes    Medication side effects:  None    Additional concerns today:  No          PROBLEMS TO ADD ON...\"In addition to a preventive exam, we addressed previous atrial fibrillation and nonischemic cardiomyopathy, benign essential tremor, erectile dysfunction.      The patient sees cardiology once a year and has seen Dr Madison with  EP cardiology at [facility]note 3] on 11/2/2022. He has had an ablation without subsequent atrial fibrillation episodes but has decided with his cardiologist to remain on Eliquis. He takes lisinopril for nonischemic cardiomyopathy. No symptoms of chest pain, dyspnea, palpitations or dizziness.   Refilled Eliquis,      He has found some benefit from propranolol at dosing of 20 mg twice daily. He didn't like side effects of 40 mg BID. He knows that he could take an extra pill prn if/when he needs to present in front of a group again. He retired in June 2021 and does not find himself speaking before groups now.      He has previously found Cialis to be helpful for erectile dysfunction. He states that he has plenty of tablets from before and declines a new Rx.          Social History     Tobacco Use    Smoking status: Never    Smokeless tobacco: Never   Substance Use Topics    Alcohol use: Yes     Comment: 1 drink weekly             8/17/2023     9:43 AM   Alcohol Use   Prescreen: >3 drinks/day or >7 drinks/week? No       Last PSA:   PSA   Date Value Ref Range Status   08/07/2020 " "0.72 0 - 4 ug/L Final     Comment:     Assay Method:  Chemiluminescence using Siemens Vista analyzer     Prostate Specific Antigen Screen   Date Value Ref Range Status   08/16/2022 0.57 0.00 - 4.00 ug/L Final       Reviewed orders with patient. Reviewed health maintenance and updated orders accordingly - Yes    Reviewed and updated as needed this visit by clinical staff   Tobacco  Allergies  Meds              Reviewed and updated as needed this visit by Provider                   Review of Systems   Constitutional:  Negative for chills and fever.   HENT:  Negative for congestion, ear pain, hearing loss and sore throat.    Eyes:  Negative for pain and visual disturbance.   Respiratory:  Negative for cough and shortness of breath.    Cardiovascular:  Negative for chest pain, palpitations and peripheral edema.   Gastrointestinal:  Negative for abdominal pain, constipation, diarrhea, heartburn, hematochezia and nausea.   Genitourinary:  Negative for dysuria, frequency, genital sores, hematuria, impotence, penile discharge and urgency.   Musculoskeletal:  Negative for arthralgias, joint swelling and myalgias.   Skin:  Negative for rash.   Neurological:  Negative for dizziness, weakness, headaches and paresthesias.   Psychiatric/Behavioral:  Negative for mood changes. The patient is not nervous/anxious.        OBJECTIVE:   /66 (BP Location: Left arm, Patient Position: Sitting, Cuff Size: Adult Large)   Pulse 55   Temp 98.1  F (36.7  C) (Oral)   Resp 18   Ht 1.945 m (6' 4.58\")   Wt 101.2 kg (223 lb)   SpO2 99%   BMI 26.74 kg/m      Physical Exam  GENERAL: healthy, alert and no distress  EYES: Eyes grossly normal to inspection, PERRL and conjunctivae and sclerae normal  HENT: ear canals and TM's normal, nose and mouth without ulcers or lesions  NECK: no adenopathy, no asymmetry, masses, or scars and thyroid normal to palpation  RESP: lungs clear to auscultation - no rales, rhonchi or wheezes  CV: regular " rate and rhythm, normal S1 S2, no S3 or S4, no murmur, click or rub, no peripheral edema and peripheral pulses strong  ABDOMEN: soft, nontender, no hepatosplenomegaly, no masses and bowel sounds normal  MS: no gross musculoskeletal defects noted, no edema  SKIN: no suspicious lesions or rashes  NEURO: Normal strength and tone, mentation intact and speech normal  PSYCH: mentation appears normal, affect normal/bright    Diagnostic Test Results:  Labs reviewed in Epic    ASSESSMENT/PLAN:   (Z00.00) Routine general medical examination at a health care facility  (primary encounter diagnosis)  Comment: Stable health. See epic orders.   Plan: Lipid panel reflex to direct LDL Fasting, **TSH        with free T4 reflex FUTURE 2mo, **CBC with         platelets differential FUTURE 2mo,         **Comprehensive metabolic panel FUTURE 2mo          (I48.91) Atrial fibrillation, unspecified type (H)  (Z79.01) Long term current use of anticoagulant therapy  Comment: Continue chronic oral anticoagulation.  Plan: apixaban ANTICOAGULANT (ELIQUIS ANTICOAGULANT)         5 MG tablet, OFFICE/OUTPT VISIT,EST,LEVL III          (I42.8) Nonischemic cardiomyopathy (H)  Comment: Follow up with cardiology as they advise. Continue current meds .  Plan: lisinopril (ZESTRIL) 2.5 MG tablet,         OFFICE/OUTPT VISIT,EST,LEVL III          (G25.0) Benign essential tremor  Comment: Symptoms satisfactorily controlled. Continue current meds.   Plan: propranolol (INDERAL) 20 MG tablet,         OFFICE/OUTPT VISIT,EST,LEVL III          (Z13.6) CARDIOVASCULAR SCREENING; LDL GOAL LESS THAN 160  Plan: Lipid panel reflex to direct LDL Fasting,         **Comprehensive metabolic panel FUTURE 2mo          (Z12.5) Special screening for malignant neoplasm of prostate  Plan: PSA, screen          (Z79.899) Medication management  Plan: **TSH with free T4 reflex FUTURE 2mo, **CBC         with platelets differential FUTURE 2mo          (R25.1) Tremor  Plan: **TSH with  "free T4 reflex FUTURE 2mo              Patient has been advised of split billing requirements and indicates understanding: Yes      COUNSELING:   Reviewed preventive health counseling, as reflected in patient instructions      BMI:   Estimated body mass index is 26.74 kg/m  as calculated from the following:    Height as of this encounter: 1.945 m (6' 4.58\").    Weight as of this encounter: 101.2 kg (223 lb).   Weight management plan: Discussed healthy diet and exercise guidelines      He reports that he has never smoked. He has never used smokeless tobacco.            Marc Mahmood MD,   [facility]  "

## 2023-08-18 NOTE — PATIENT INSTRUCTIONS
Everything looks fine!    Refills of medication have been faxed to your pharmacy.     Lab results will be available soon on BRAIN.    See me in a year, sooner if problems.

## 2024-02-16 ENCOUNTER — PATIENT OUTREACH (OUTPATIENT)
Dept: GASTROENTEROLOGY | Facility: CLINIC | Age: 64
End: 2024-02-16
Payer: COMMERCIAL

## 2024-04-09 NOTE — PROGRESS NOTES
Caller: Behzad Guzman    Relationship to patient: Self    Best call back number: [phone]     PATIENT STATES THAT HE WAS RECENTLY REFERRED TO CARDIOLOGY, BUT THEY DID NOT GET HIS RECORDS FOR THAT VISIT.  CAN THOSE BE SENTAS SOON AS POSSIBLE?   "[facility] Initial Evaluation -- Lumbar    Date: October 9, 2019  John Patel is a 59 year old male with a LBP condition.   Referral: Dr. Yeo Work mechanical stresses:  Computer work; driving  Employment status:  Bus   Leisure mechanical stresses: swim  Functional disability score (ADELAIDE/STarT Back):  See flowsheet  VAS score (0-10): 4/10  Patient goals/expectations:  \"to eliminate the pain\"    HISTORY:    Present symptoms: L LBP  Pain quality (sharp/shooting/stabbing/aching/burning/cramping):  Sharp; shooting   Paresthesia (yes/no):  no    Present since (onset date): September 30, 2019.     Symptoms (improving/unchanging/worsening):  worsening.     Symptoms commenced as a result of: running home with dog in a storm   Condition occurred in the following environment:        Symptoms at onset (back/thigh/leg): L LBP and L Leg  Constant symptoms (back/thigh/leg): L LBP and L leg  Intermittent symptoms (back/thigh/leg):     Symptoms are made worse with the following: Always Rising, Always sitting, Always Standing and Always Walking   Symptoms are made better with the following: Always Lying    Disturbed sleep (yes/no):  yes Sleeping postures (prone/sup/side R/L): sup    Previous episodes (0/1-5/6-10/11+):  Year of first episode:     Previous history: LBP  Previous treatments: Steroid injection- 1st injection helpful second injection NOT helpful      Specific Questions:  Cough/Sneeze/Strain (pos/neg): pos  Bowel/Bladder (normal/abnormal): normal  Gait (normal/abnormal): abnormal  Medications (nil/NSAIDS/analg/steroids/anticoag/other):  NSAIDS and Other - High blood pressure  Medical allergies:  none  General health (excellent/good/fair/poor):  good  Pertinent medical history:  Heart problems  Imaging (None/Xray/MRI/Other):  MRI  Recent or major surgery (yes/no):  no  Night pain (yes/no): no  Accidents (yes/no): no  Unexplained weight loss (yes/no): no  Barriers at home: " no  Other red flags: no    EXAMINATION    Posture:   Sitting (good/fair/poor): fair  Standing (good/fair/poor):poor  Lordosis (red/acc/normal): normal  Correction of posture (better/worse/no effect): did not do    Lateral Shift (right/left/nil): Right  Relevant (yes/no):  yes  Other Observations:     Neurological:    Motor deficit:  Hip flexion= 3+/5 ; Knee extension= WNL  Reflexes:    Sensory deficit:    Dural signs:      Movement Loss:   Norbert Mod Min Nil Pain   Flexion   x  pdm   Extension x    pdm into L leg   Side Gliding R  x      Side Gliding L x x   pdm into L leg     Test Movements:   During: produces, abolishes, increases, decreases, no effect, centralizing, peripheralizing   After: better, worse, no better, no worse, no effect, centralized, peripheralized    Pretest symptoms standing:    Symptoms During Symptoms After ROM increased ROM decreased No Effect   FIS        Rep FIS        EIS        Rep EIS        Pretest symptoms lying: L LBP and L leg pain    Symptoms During Symptoms After ROM increased ROM decreased No Effect   RASHMI        Rep RASHMI        EIL        Rep EIL        If required, pretest symptoms:    Symptoms During Symptoms After ROM increased ROM decreased No Effect   SGIS - R        Rep SGIS - R        SGIS - L        Rep SGIS - L          Static Tests:  Sitting slouched:    Sitting erect:    Standing slouched   Standing erect:    Lying prone in extension:   Long sitting:      Other Tests: R sidelying: abolish/no better    Provisional Classification:  Inconclusive/Other - Mechanically Unresponsive Radiculopathy    Principle of Management:  Education:  Therapeutic dose of exercise, traffic light, centralization, posture   Equipment provided:    Mechanical therapy (Y/N):  Y   Extension principle:    Lateral Principle:    Flexion principle:    Other:  R sidelying: abolish/no better    ASSESSMENT/PLAN:    Patient is a 59 year old male with lumbar complaints.    Patient has the following significant  findings with corresponding treatment plan.                Diagnosis 1:  Lumbar Radiculopathy  Pain -  manual therapy, self management, education, directional preference exercise and home program  Decreased ROM/flexibility - manual therapy and therapeutic exercise  Decreased function - therapeutic activities  Impaired posture - neuro re-education    Therapy Evaluation Codes:   1) History comprised of:   Personal factors that impact the plan of care:      None.    Comorbidity factors that impact the plan of care are:      Heart problems.     Medications impacting care: Anti-depressant, High blood pressure and Pain.  2) Examination of Body Systems comprised of:   Body structures and functions that impact the plan of care:      Lumbar spine.   Activity limitations that impact the plan of care are:      Bending, Lifting, Sitting, Standing and Walking.  3) Clinical presentation characteristics are:   Evolving/Changing.  4) Decision-Making    Moderate complexity using standardized patient assessment instrument and/or measureable assessment of functional outcome.  Cumulative Therapy Evaluation is: Moderate complexity.    Previous and current functional limitations:  (See Goal Flow Sheet for this information)    Short term and Long term goals: (See Goal Flow Sheet for this information)     Communication ability:  Patient appears to be able to clearly communicate and understand verbal and written communication and follow directions correctly.  Treatment Explanation - The following has been discussed with the patient:   RX ordered/plan of care  Anticipated outcomes  Possible risks and side effects  This patient would benefit from PT intervention to resume normal activities.   Rehab potential is good.    Frequency:  1 X week, once daily  Duration:  for 6 weeks  Discharge Plan:  Achieve all LTG.  Independent in home treatment program.  Reach maximal therapeutic benefit.    Please refer to the daily flowsheet for treatment  today, total treatment time and time spent performing 1:1 timed codes.

## 2024-08-17 SDOH — HEALTH STABILITY: PHYSICAL HEALTH: ON AVERAGE, HOW MANY DAYS PER WEEK DO YOU ENGAGE IN MODERATE TO STRENUOUS EXERCISE (LIKE A BRISK WALK)?: 7 DAYS

## 2024-08-17 SDOH — HEALTH STABILITY: PHYSICAL HEALTH: ON AVERAGE, HOW MANY MINUTES DO YOU ENGAGE IN EXERCISE AT THIS LEVEL?: 50 MIN

## 2024-08-17 ASSESSMENT — SOCIAL DETERMINANTS OF HEALTH (SDOH): HOW OFTEN DO YOU GET TOGETHER WITH FRIENDS OR RELATIVES?: THREE TIMES A WEEK

## 2024-08-20 ENCOUNTER — OFFICE VISIT (OUTPATIENT)
Dept: INTERNAL MEDICINE | Facility: CLINIC | Age: 64
End: 2024-08-20
Attending: INTERNAL MEDICINE
Payer: COMMERCIAL

## 2024-08-20 VITALS
HEART RATE: 52 BPM | DIASTOLIC BLOOD PRESSURE: 64 MMHG | TEMPERATURE: 97.7 F | HEIGHT: 76 IN | SYSTOLIC BLOOD PRESSURE: 99 MMHG | OXYGEN SATURATION: 99 % | WEIGHT: 209 LBS | BODY MASS INDEX: 25.45 KG/M2 | RESPIRATION RATE: 18 BRPM

## 2024-08-20 DIAGNOSIS — Z98.890 S/P ABLATION OF ATRIAL FIBRILLATION: ICD-10-CM

## 2024-08-20 DIAGNOSIS — G25.0 BENIGN ESSENTIAL TREMOR: ICD-10-CM

## 2024-08-20 DIAGNOSIS — Z13.6 CARDIOVASCULAR SCREENING; LDL GOAL LESS THAN 160: ICD-10-CM

## 2024-08-20 DIAGNOSIS — Z12.5 SPECIAL SCREENING FOR MALIGNANT NEOPLASM OF PROSTATE: ICD-10-CM

## 2024-08-20 DIAGNOSIS — Z00.00 ROUTINE GENERAL MEDICAL EXAMINATION AT A HEALTH CARE FACILITY: Primary | ICD-10-CM

## 2024-08-20 DIAGNOSIS — Z79.01 LONG TERM CURRENT USE OF ANTICOAGULANT THERAPY: ICD-10-CM

## 2024-08-20 DIAGNOSIS — I48.91 ATRIAL FIBRILLATION, UNSPECIFIED TYPE (H): ICD-10-CM

## 2024-08-20 DIAGNOSIS — Z86.79 S/P ABLATION OF ATRIAL FIBRILLATION: ICD-10-CM

## 2024-08-20 DIAGNOSIS — Z79.899 MEDICATION MANAGEMENT: ICD-10-CM

## 2024-08-20 DIAGNOSIS — I42.8 NONISCHEMIC CARDIOMYOPATHY (H): ICD-10-CM

## 2024-08-20 DIAGNOSIS — L71.9 ROSACEA: ICD-10-CM

## 2024-08-20 LAB
ALBUMIN SERPL BCG-MCNC: 4.3 G/DL (ref 3.5–5.2)
ALP SERPL-CCNC: 75 U/L (ref 40–150)
ALT SERPL W P-5'-P-CCNC: 17 U/L (ref 0–70)
ANION GAP SERPL CALCULATED.3IONS-SCNC: 9 MMOL/L (ref 7–15)
AST SERPL W P-5'-P-CCNC: 29 U/L (ref 0–45)
BILIRUB SERPL-MCNC: 1 MG/DL
BUN SERPL-MCNC: 15.5 MG/DL (ref 8–23)
CALCIUM SERPL-MCNC: 9.3 MG/DL (ref 8.8–10.4)
CHLORIDE SERPL-SCNC: 106 MMOL/L (ref 98–107)
CHOLEST SERPL-MCNC: 163 MG/DL
CREAT SERPL-MCNC: 0.95 MG/DL (ref 0.67–1.17)
EGFRCR SERPLBLD CKD-EPI 2021: 90 ML/MIN/1.73M2
ERYTHROCYTE [DISTWIDTH] IN BLOOD BY AUTOMATED COUNT: 12 % (ref 10–15)
FASTING STATUS PATIENT QL REPORTED: YES
FASTING STATUS PATIENT QL REPORTED: YES
GLUCOSE SERPL-MCNC: 87 MG/DL (ref 70–99)
HCO3 SERPL-SCNC: 28 MMOL/L (ref 22–29)
HCT VFR BLD AUTO: 43.5 % (ref 40–53)
HDLC SERPL-MCNC: 53 MG/DL
HGB BLD-MCNC: 14.8 G/DL (ref 13.3–17.7)
LDLC SERPL CALC-MCNC: 95 MG/DL
MCH RBC QN AUTO: 31.4 PG (ref 26.5–33)
MCHC RBC AUTO-ENTMCNC: 34 G/DL (ref 31.5–36.5)
MCV RBC AUTO: 92 FL (ref 78–100)
NONHDLC SERPL-MCNC: 110 MG/DL
PLATELET # BLD AUTO: 231 10E3/UL (ref 150–450)
POTASSIUM SERPL-SCNC: 4.3 MMOL/L (ref 3.4–5.3)
PROT SERPL-MCNC: 6.8 G/DL (ref 6.4–8.3)
PSA SERPL DL<=0.01 NG/ML-MCNC: 0.6 NG/ML (ref 0–4.5)
RBC # BLD AUTO: 4.71 10E6/UL (ref 4.4–5.9)
SODIUM SERPL-SCNC: 143 MMOL/L (ref 135–145)
TRIGL SERPL-MCNC: 76 MG/DL
TSH SERPL DL<=0.005 MIU/L-ACNC: 1.4 UIU/ML (ref 0.3–4.2)
WBC # BLD AUTO: 4.8 10E3/UL (ref 4–11)

## 2024-08-20 PROCEDURE — 99396 PREV VISIT EST AGE 40-64: CPT | Performed by: INTERNAL MEDICINE

## 2024-08-20 PROCEDURE — 36415 COLL VENOUS BLD VENIPUNCTURE: CPT | Performed by: INTERNAL MEDICINE

## 2024-08-20 PROCEDURE — 85027 COMPLETE CBC AUTOMATED: CPT | Performed by: INTERNAL MEDICINE

## 2024-08-20 PROCEDURE — 84443 ASSAY THYROID STIM HORMONE: CPT | Performed by: INTERNAL MEDICINE

## 2024-08-20 PROCEDURE — 80061 LIPID PANEL: CPT | Performed by: INTERNAL MEDICINE

## 2024-08-20 PROCEDURE — 80053 COMPREHEN METABOLIC PANEL: CPT | Performed by: INTERNAL MEDICINE

## 2024-08-20 PROCEDURE — G0103 PSA SCREENING: HCPCS | Performed by: INTERNAL MEDICINE

## 2024-08-20 PROCEDURE — 99213 OFFICE O/P EST LOW 20 MIN: CPT | Mod: 25 | Performed by: INTERNAL MEDICINE

## 2024-08-20 RX ORDER — LISINOPRIL 2.5 MG/1
2.5 TABLET ORAL DAILY
Qty: 90 TABLET | Refills: 3 | Status: SHIPPED | OUTPATIENT
Start: 2024-08-20

## 2024-08-20 RX ORDER — METRONIDAZOLE 7.5 MG/G
LOTION TOPICAL 2 TIMES DAILY
COMMUNITY
End: 2024-08-20

## 2024-08-20 RX ORDER — METRONIDAZOLE 7.5 MG/G
LOTION TOPICAL 2 TIMES DAILY
Qty: 59 ML | Refills: 3 | Status: SHIPPED | OUTPATIENT
Start: 2024-08-20 | End: 2024-09-28

## 2024-08-20 RX ORDER — PROPRANOLOL HYDROCHLORIDE 40 MG/1
40 TABLET ORAL 2 TIMES DAILY
Qty: 180 TABLET | Refills: 3 | Status: SHIPPED | OUTPATIENT
Start: 2024-08-20

## 2024-08-20 ASSESSMENT — LIFESTYLE VARIABLES
HOW OFTEN DO YOU HAVE A DRINK CONTAINING ALCOHOL: 2-3 TIMES A WEEK
SKIP TO QUESTIONS 9-10: 1
HOW OFTEN DO YOU HAVE SIX OR MORE DRINKS ON ONE OCCASION: NEVER
HOW MANY STANDARD DRINKS CONTAINING ALCOHOL DO YOU HAVE ON A TYPICAL DAY: 1 OR 2
AUDIT-C TOTAL SCORE: 3

## 2024-08-20 NOTE — PROGRESS NOTES
"Preventive Care Visit  [facility]  Marc Mahmood MD, Internal Medicine  Aug 20, 2024      Assessment & Plan   (Z00.00) Routine general medical examination at a health care facility  (primary encounter diagnosis)  Comment: Stable health. See epic orders.     (I48.91) Atrial fibrillation, unspecified type (H)  (Z98.890,  Z86.79) S/P ablation of atrial fibrillation  Comment: Appears to be in sinus rhythm.   Plan: apixaban ANTICOAGULANT (ELIQUIS ANTICOAGULANT)         5 MG tablet, TSH with free T4 reflex,         OFFICE/OUTPT VISIT,EST,LEVL III          (Z79.01) Long term current use of anticoagulant therapy  Plan: OFFICE/OUTPT VISIT,EST,LEVL III          (I42.8) Nonischemic cardiomyopathy (H)  Comment: No overt CHF. Continue current meds.   Plan: lisinopril (ZESTRIL) 2.5 MG tablet,         Comprehensive metabolic panel, OFFICE/OUTPT         VISIT,EST,LEVL III          (G25.0) Benign essential tremor  Comment: Will raise dose of propranolol to 40 mg po BID.   Plan: propranolol (INDERAL) 40 MG tablet, TSH with         free T4 reflex, OFFICE/OUTPT VISIT,EST,LEVL III          (L71.9) Rosacea  Comment: Refilled metronidazole lotion.   Plan: metroNIDAZOLE (METROLOTION) 0.75 % external         lotion, OFFICE/OUTPT VISIT,EST,LEVL III          (Z13.6) CARDIOVASCULAR SCREENING; LDL GOAL LESS THAN 160  Plan: Comprehensive metabolic panel, Lipid panel         reflex to direct LDL Fasting          (Z12.5) Special screening for malignant neoplasm of prostate  Plan: PSA, screen          (Z79.899) Medication management  Plan: CBC with platelets, TSH with free T4 reflex            Patient has been advised of split billing requirements and indicates understanding: Yes        BMI  Estimated body mass index is 25.19 kg/m  as calculated from the following:    Height as of this encounter: 1.94 m (6' 4.38\").    Weight as of this encounter: 94.8 kg (209 lb).       Counseling  Appropriate preventive services were " addressed with this patient via screening, questionnaire, or discussion as appropriate for fall prevention, nutrition, physical activity, Tobacco-use cessation, social engagement, weight loss and cognition.  Checklist reviewing preventive services available has been given to the patient.  Reviewed patient's diet, addressing concerns and/or questions.       Patient Instructions  Consider getting Shingles (Shingrix), and Covid-19 immunizations at your pharmacy.     Everything looks fine.    Refills of medications have been faxed to your pharmacy.     Dr Menjivar would like to see you again, and check an echocardiogram.     Lab results will be available soon on [employer].    See me in a year, sooner if problems.             Subjective   Hilton is a 64 year old, presenting for the following:  Physical        8/20/2024     8:10 AM   Additional Questions   Roomed by Edelmira GODOY CMA        Health Care Directive  Patient has a Health Care Directive on file  Advance care planning document is on file and is current.    HPI  In addition to a preventive exam, we addressed previous atrial fibrillation and nonischemic cardiomyopathy, benign essential tremor, erectile dysfunction.      The patient is reminded that he is due for follow up with Dr Madison with  EP cardiology at [facility]. He has had an ablation without subsequent atrial fibrillation episodes but has decided with his cardiologist to remain on Eliquis. He takes lisinopril for nonischemic cardiomyopathy. No symptoms of chest pain, dyspnea, palpitations or dizziness.   Refilled Eliquis, lisinopril and propranolol.      He has found some benefit from propranolol at dosing of 20 mg twice daily. He originally didn't like side effects of 40 mg BID, but notes that his tremor is suboptimally controlled and is interested in trying to raise the dose.     He has previously found Cialis to be helpful for erectile dysfunction. He states that he has plenty of  tablets from before and declines a new Rx.              8/17/2024   General Health   How would you rate your overall physical health? Excellent   Feel stress (tense, anxious, or unable to sleep) Not at all            8/17/2024   Nutrition   Three or more servings of calcium each day? Yes   Diet: Regular (no restrictions)   How many servings of fruit and vegetables per day? 4 or more   How many sweetened beverages each day? 0-1            8/17/2024   Exercise   Days per week of moderate/strenous exercise 7 days   Average minutes spent exercising at this level 50 min            8/17/2024   Social Factors   Frequency of gathering with friends or relatives Three times a week   Worry food won't last until get money to buy more No   Food not last or not have enough money for food? No   Do you have housing? (Housing is defined as stable permanent housing and does not include staying ouside in a car, in a tent, in an abandoned building, in an overnight shelter, or couch-surfing.) Yes   Are you worried about losing your housing? No   Lack of transportation? No   Unable to get utilities (heat,electricity)? No            8/17/2024   Fall Risk   Fallen 2 or more times in the past year? No   Trouble with walking or balance? No             8/17/2024   Dental   Dentist two times every year? Yes            8/17/2024   TB Screening   Were you born outside of the [place]? No            Today's PHQ-2 Score:       8/19/2024     4:46 PM   PHQ-2 ( 1999 [employer])   Q1: Little interest or pleasure in doing things 0   Q2: Feeling down, depressed or hopeless 0   PHQ-2 Score 0   Q1: Little interest or pleasure in doing things Not at all   Q2: Feeling down, depressed or hopeless Not at all   PHQ-2 Score 0           8/17/2024   Substance Use   Alcohol more than 3/day or more than 7/wk No   Do you use any other substances recreationally? No        Social History     Tobacco Use    Smoking status: Never    Smokeless tobacco: Never   Vaping Use    Vaping  "status: Never Used   Substance Use Topics    Alcohol use: Yes     Comment: 1 drink weekly    Drug use: No           8/17/2024   STI Screening   New sexual partner(s) since last STI/HIV test? No      Last PSA:   PSA   Date Value Ref Range Status   08/07/2020 0.72 0 - 4 ug/L Final     Comment:     Assay Method:  Chemiluminescence using Siemens Vista analyzer     Prostate Specific Antigen Screen   Date Value Ref Range Status   08/18/2023 0.59 0.00 - 4.50 ng/mL Final   08/16/2022 0.57 0.00 - 4.00 ug/L Final     ASCVD Risk   The 10-year ASCVD risk score (Curtis ANGEL, et al., 2019) is: 6.5%    Values used to calculate the score:      Age: 64 years      Sex: Male      Is Non- : No      Diabetic: No      Tobacco smoker: No      Systolic Blood Pressure: 99 mmHg      Is BP treated: No      HDL Cholesterol: 46 mg/dL      Total Cholesterol: 167 mg/dL         Reviewed and updated as needed this visit by Provider                    Past medical, family and social histories as well as medications reviewed and updated as needed.    REVIEW OF SYSTEMS: The following systems have been completely reviewed and are negative except as noted above:   Constitutional, HEENT, respiratory, cardiovascular, gastrointestinal, genitourinary, musculoskeletal, dermatologic, hematologic, endocrine, psychiatric, and neurologic systems.       Objective    Exam  BP 99/64 (BP Location: Left arm, Patient Position: Sitting, Cuff Size: Adult Large)   Pulse 52   Temp 97.7  F (36.5  C) (Oral)   Resp 18   Ht 1.94 m (6' 4.38\")   Wt 94.8 kg (209 lb)   SpO2 99%   BMI 25.19 kg/m     Estimated body mass index is 25.19 kg/m  as calculated from the following:    Height as of this encounter: 1.94 m (6' 4.38\").    Weight as of this encounter: 94.8 kg (209 lb).    Physical Exam  GENERAL: alert and no distress  EYES: Eyes grossly normal to inspection, PERRL and conjunctivae and sclerae normal  HENT: ear canals and TM's normal, nose " and mouth without ulcers or lesions  NECK: no adenopathy, no asymmetry, masses, or scars  RESP: lungs clear to auscultation - no rales, rhonchi or wheezes  CV: regular rate and rhythm, normal S1 S2, no S3 or S4, no murmur, click or rub, no peripheral edema  ABDOMEN: soft, nontender, no hepatosplenomegaly, no masses and bowel sounds normal  MS: no gross musculoskeletal defects noted, no edema  SKIN: no suspicious lesions or rashes  NEURO: Normal strength and tone, mentation intact and speech normal  PSYCH: mentation appears normal, affect normal/bright        Signed Electronically by: Marc Mahmood MD,

## 2024-08-20 NOTE — PATIENT INSTRUCTIONS
Patient Education   Preventive Care Advice   This is general advice given by our system to help you stay healthy. However, your care team may have specific advice just for you. Please talk to your care team about your preventive care needs.  Nutrition  Eat 5 or more servings of fruits and vegetables each day.  Try wheat bread, brown rice and whole grain pasta (instead of white bread, rice, and pasta).  Get enough calcium and vitamin D. Check the label on foods and aim for 100% of the RDA (recommended daily allowance).  Lifestyle  Exercise at least 150 minutes each week  (30 minutes a day, 5 days a week).  Do muscle strengthening activities 2 days a week. These help control your weight and prevent disease.  No smoking.  Wear sunscreen to prevent skin cancer.  Have a dental exam and cleaning every 6 months.  Yearly exams  See your health care team every year to talk about:  Any changes in your health.  Any medicines your care team has prescribed.  Preventive care, family planning, and ways to prevent chronic diseases.  Shots (vaccines)   HPV shots (up to age 26), if you've never had them before.  Hepatitis B shots (up to age 59), if you've never had them before.  COVID-19 shot: Get this shot when it's due.  Flu shot: Get a flu shot every year.  Tetanus shot: Get a tetanus shot every 10 years.  Pneumococcal, hepatitis A, and RSV shots: Ask your care team if you need these based on your risk.  Shingles shot (for age 50 and up)  General health tests  Diabetes screening:  Starting at age 35, Get screened for diabetes at least every 3 years.  If you are younger than age 35, ask your care team if you should be screened for diabetes.  Cholesterol test: At age 39, start having a cholesterol test every 5 years, or more often if advised.  Bone density scan (DEXA): At age 50, ask your care team if you should have this scan for osteoporosis (brittle bones).  Hepatitis C: Get tested at least once in your life.  STIs (sexually  transmitted infections)  Before age 24: Ask your care team if you should be screened for STIs.  After age 24: Get screened for STIs if you're at risk. You are at risk for STIs (including HIV) if:  You are sexually active with more than one person.  You don't use condoms every time.  You or a partner was diagnosed with a sexually transmitted infection.  If you are at risk for HIV, ask about PrEP medicine to prevent HIV.  Get tested for HIV at least once in your life, whether you are at risk for HIV or not.  Cancer screening tests  Cervical cancer screening: If you have a cervix, begin getting regular cervical cancer screening tests starting at age 21.  Breast cancer scan (mammogram): If you've ever had breasts, begin having regular mammograms starting at age 40. This is a scan to check for breast cancer.  Colon cancer screening: It is important to start screening for colon cancer at age 45.  Have a colonoscopy test every 10 years (or more often if you're at risk) Or, ask your provider about stool tests like a FIT test every year or Cologuard test every 3 years.  To learn more about your testing options, visit:   .  For help making a decision, visit:   https://bit.ly/kt85637.  Prostate cancer screening test: If you have a prostate, ask your care team if a prostate cancer screening test (PSA) at age 55 is right for you.  Lung cancer screening: If you are a current or former smoker ages 50 to 80, ask your care team if ongoing lung cancer screenings are right for you.  For informational purposes only. Not to replace the advice of your health care provider. Copyright   2023 [facility]. All rights reserved. Clinically reviewed by the [facility]. [employer] [id number] - REV 01/24.       Patient Instructions  Consider getting Shingles (Shingrix), and Covid-19 immunizations at your pharmacy.     Everything looks fine.    Refills of medications have been faxed to your pharmacy.       Tiara would like to see you again, and check an echocardiogram.     Lab results will be available soon on [employer].    See me in a year, sooner if problems.

## 2024-08-21 ENCOUNTER — MYC MEDICAL ADVICE (OUTPATIENT)
Dept: INTERNAL MEDICINE | Facility: CLINIC | Age: 64
End: 2024-08-21

## 2024-08-21 DIAGNOSIS — L71.9 ROSACEA: ICD-10-CM

## 2024-09-09 RX ORDER — METRONIDAZOLE 7.5 MG/G
LOTION TOPICAL 2 TIMES DAILY
Qty: 177 ML | OUTPATIENT
Start: 2024-09-09

## 2024-09-09 NOTE — TELEPHONE ENCOUNTER
Patient calling to follow up .Please see patient's message. We need to send an order for the metronidazole lotion for a 90 day supply in order for his insurance to cover this. We sent the order for 59mL.

## 2024-09-09 NOTE — TELEPHONE ENCOUNTER
Metronidazole lotion prescription pended for 90 day supply (per message from patient 59 mL is only 30 day supply and insurance requires 90 day). Routed to refill team to review request for increased quantity.     Cindy Leggett RN  [facility]

## 2024-09-25 DIAGNOSIS — L71.9 ROSACEA: ICD-10-CM

## 2024-09-25 RX ORDER — METRONIDAZOLE 7.5 MG/G
LOTION TOPICAL 2 TIMES DAILY
Qty: 177 ML | Refills: 3 | Status: CANCELLED | OUTPATIENT
Start: 2024-09-25

## 2024-09-27 ENCOUNTER — TELEPHONE (OUTPATIENT)
Dept: INTERNAL MEDICINE | Facility: CLINIC | Age: 64
End: 2024-09-27
Payer: COMMERCIAL

## 2024-09-27 DIAGNOSIS — L71.9 ROSACEA: ICD-10-CM

## 2024-09-27 NOTE — TELEPHONE ENCOUNTER
General Call            DO NOT route to Dr Mahmood, he is out of office       Reason for Call:  ML change on medication    What are your questions or concerns:  patient says the 59ml equates to a 30 day supply and his pharmacy will only cover a 90 days which is 177ML. Patient asking for this change and to [facility] to [facility]      Date of last appointment with provider: 8- this is the date that this script was written and he has not gotten it yet.     Could we send this information to you in [employer] or would you prefer to receive a phone call?:   Patient would prefer a phone call     Okay to leave a detailed message?: Yes at Cell number on file:    Telephone Information:   Mobile [phone]     Patient asked that if the covering MD will not approve this, to pls call him.

## 2024-09-28 RX ORDER — METRONIDAZOLE 7.5 MG/G
LOTION TOPICAL 2 TIMES DAILY
Qty: 177 ML | Refills: 0 | Status: SHIPPED | OUTPATIENT
Start: 2024-09-28

## 2024-09-28 RX ORDER — METRONIDAZOLE 7.5 MG/G
LOTION TOPICAL
Refills: 0 | OUTPATIENT
Start: 2024-09-28

## 2025-06-03 ENCOUNTER — DOCUMENTATION ONLY (OUTPATIENT)
Dept: ANTICOAGULATION | Facility: CLINIC | Age: 65
End: 2025-06-03
Payer: COMMERCIAL

## 2025-06-03 NOTE — PROGRESS NOTES
ANTICOAGULATION DIRECT ORAL ANTICOAGULANT MONITORING    SUBJECTIVE     The [facility] is evaluating John Patel's Apixaban (Eliquis) as part of its Anticoagulation Monitoring Program.    Indication:Atrial Fibrillation  Current dose per medication list: Apixaban 5 mg TWICE daily  Recent hospitalizations/ED/Office Visits for bleeding/clotting concerns: No  Other bleeding or side effect concerns: No  Additional findings: None    OBJECTIVE     Age: 65 year old    Adherence score:64 as of 12/28/2024    Wt Readings from Last 2 Encounters:   08/20/24 94.8 kg (209 lb)   08/18/23 101.2 kg (223 lb)      Lab Results   Component Value Date    CR 0.95 08/20/2024    CR 1.10 08/18/2023    CR 1.18 (H) 05/10/2023     Creatinine Clearance (using actual bodyweight, mL/min):     Lab Results   Component Value Date    HGB 14.8 08/20/2024    HGB 15.4 08/07/2020     08/20/2024     08/07/2020       ASSESSMENT/PLAN     A chart review for Direct Oral Anticoagulant (DOAC) Stewardship has been completed for:     Epic adherence score < 80% (based on available claims data). Patient appears compliant: 1 year prescription e-faxed on 8/20/24. No intervention recommended.    No intervention recommended    Calista Toussaint RN  Anticoagulation Clinic

## 2025-07-11 ENCOUNTER — ANCILLARY PROCEDURE (OUTPATIENT)
Dept: GENERAL RADIOLOGY | Facility: CLINIC | Age: 65
End: 2025-07-11
Attending: STUDENT IN AN ORGANIZED HEALTH CARE EDUCATION/TRAINING PROGRAM
Payer: COMMERCIAL

## 2025-07-11 PROCEDURE — 72040 X-RAY EXAM NECK SPINE 2-3 VW: CPT | Mod: TC | Performed by: RADIOLOGY

## 2025-07-29 ENCOUNTER — THERAPY VISIT (OUTPATIENT)
Dept: PHYSICAL THERAPY | Facility: CLINIC | Age: 65
End: 2025-07-29
Attending: STUDENT IN AN ORGANIZED HEALTH CARE EDUCATION/TRAINING PROGRAM
Payer: COMMERCIAL

## 2025-07-29 DIAGNOSIS — M54.2 CERVICAL PAIN: ICD-10-CM

## 2025-07-29 PROCEDURE — 97110 THERAPEUTIC EXERCISES: CPT | Mod: GP | Performed by: PHYSICAL THERAPIST

## 2025-07-29 PROCEDURE — 97161 PT EVAL LOW COMPLEX 20 MIN: CPT | Mod: GP | Performed by: PHYSICAL THERAPIST

## 2025-07-29 PROCEDURE — 97530 THERAPEUTIC ACTIVITIES: CPT | Mod: GP | Performed by: PHYSICAL THERAPIST

## 2025-07-29 NOTE — PROGRESS NOTES
PHYSICAL THERAPY EVALUATION  Type of Visit: Evaluation       Fall Risk Screen:  Have you fallen 2 or more times in the past year?: No  Have you fallen and had an injury in the past year?: No    Subjective         Presenting condition or subjective complaint: Neck pain  Date of onset: 25    Relevant medical history:     Dates & types of surgery:      Prior diagnostic imaging/testing results: X-ray     Prior therapy history for the same diagnosis, illness or injury: No      Prior Level of Function  Transfers: Independent  Ambulation: Independent  ADL: Independent    Living Environment  Social support: With family members   Type of home: House   Stairs to enter the home: Yes 2 Is there a railing: No     Ramp: No   Stairs inside the home: Yes 15 Is there a railing: Yes     Help at home: None  Equipment owned:       Employment: Yes   Hobbies/Interests: Walking, Swimming, Cards    Patient goals for therapy: Relieve headache and tight  muscle    Pain assessment: Pain present  Location: L neck to top of L shoulder/Ratin/10  Present symptoms: patient reports pain located in the left neck, L PHILLIPS .    Radiation:top of L shoulder   Type of pain is described as aching.  Associated symptoms include: L arm tingling.  Present since: May 1, 2025 with symptoms improving  This condition is chronic .  Symptoms commenced as a result of: doing a back bend on the couch   Condition occurred in the following environment: home   Symptoms at onset: L neck,   Constant symptoms:   Intermittent symptoms: L neck HA  Previous history/treatment: self managed       Dizziness/tinnitus/nausea/swallowing:neg    (with consideration for bending, sitting/rising, turning, lying/rising, am, as the day progresses, pm, when still, on the move, other )  Symptoms are made worse with the following: turning head left, looking up,    Symptoms are made better with the following: lying down, avoiding aggravating activities    Sleep disturbance:  mild  Sleeping postures: sleeping on L side  Sleeping surface:   Number of pillows:1-2 depending on positiion       Objective   Posture:   Sitting: fair to poor; Standing: ; Protruded head: yes; Relevant wry neck: nil  Posture Correction: brings on tingling L UE    Neurological:  Motor Deficit: wnl  Myotomes L R   C4 (shoulder elevation)     C5 (shoulder abduction)     C6 (elbow flexion)     C7 (elbow extension)     C8 (thumb extension)     T1 (finger add/abd)      Strength (lb)       Sensory Deficit, Reflexes, Dural Signs: mild + ULNTT L UE (hand/fingers)    AROM: (Major, Moderate, Minimal or Nil loss)  Movement Loss Norbert Mod Min Nil Pain   Protrusion    x    Flexion    x Felt pop/movement at upper cervical   Retraction    x Erp L upper cervical   Extension   x  Pdm L upper cervical   Lateral flexion R   x  tight   Lateral flexion L  x x     Rotation R    x    Rotation L  x x       Repeated movement testing:   (During: produces, abolishes, increases, decreases, no effect, centralizing, peripheralizing; After: better, worse, no better, no worse, no effect, centralized, peripheralized)    Pre-test Symptoms Sitting: sx free   Symptoms During Symptoms After ROM increased ROM decreased No Effect   PRO        Rep PRO        RET P L upper cer, NE on tingling NW      Rep RET P L upper cerv,  NE on tingling NW L Rot, L SB     RET EXT        Rep RET EXT        LF - R        Rep LF - R        LF - L        Rep LF - L        ROT - R        Rep ROT - R        ROT - L        Rep ROT - L        FLEX        Rep FLEX          Pre-test Symptoms Lying (if needed):    Symptoms During Symptoms After ROM increased ROM decreased No Effect   RET        Rep RET        RET EXT        Rep RET EXT          Static Tests:   Other Tests:     Provisional Classification: derangement, above elbow asymmetrical  Principle of Management (education/equipment/mechanical therapy/specific principle): rep RET + pt. Op x 8/rep Ret/EXT x 2 at end of set;  posture education       Assessment & Plan   CLINICAL IMPRESSIONS  Medical Diagnosis:      Treatment Diagnosis: neck pain/headaches with mobility, posture, and strength   Impression/Assessment: Patient is a 65 year old male with neck and HA complaints.  The following significant findings have been identified: Pain, Decreased ROM/flexibility, Decreased joint mobility, Decreased activity tolerance, and Impaired posture. These impairments interfere with their ability to perform self care tasks and recreational activities as compared to previous level of function.     Clinical Decision Making (Complexity):  Clinical Presentation: Stable/Uncomplicated  Clinical Presentation Rationale: based on medical and personal factors listed in PT evaluation  Clinical Decision Making (Complexity): Low complexity    PLAN OF CARE  Treatment Interventions:  Interventions: Manual Therapy, Neuromuscular Re-education, Therapeutic Activity, Therapeutic Exercise    Long Term Goals     PT Goal 1  Goal Identifier: driving  Goal Description: patient will demonstrate adequate range of motion in neck/trunk to safely scan traffic painfree.  Rationale: to maximize safety and independence with transportation  Target Date: 09/09/25      Frequency of Treatment: 1 x week  Duration of Treatment: 6 weeks    Recommended Referrals to Other Professionals:   Education Assessment:   Learner/Method: Patient;No Barriers to Learning;Pictures/Video    Risks and benefits of evaluation/treatment have been explained.   Patient/Family/caregiver agrees with Plan of Care.     Evaluation Time:     PT Eval, Low Complexity Minutes (01134): 20       Signing Clinician: Judith Mccarthy PT

## 2025-07-31 DIAGNOSIS — I48.91 ATRIAL FIBRILLATION, UNSPECIFIED TYPE (H): ICD-10-CM

## 2025-07-31 RX ORDER — APIXABAN 5 MG/1
5 TABLET, FILM COATED ORAL 2 TIMES DAILY
Qty: 180 TABLET | Refills: 0 | Status: SHIPPED | OUTPATIENT
Start: 2025-07-31

## 2025-08-05 ENCOUNTER — THERAPY VISIT (OUTPATIENT)
Dept: PHYSICAL THERAPY | Facility: CLINIC | Age: 65
End: 2025-08-05
Attending: STUDENT IN AN ORGANIZED HEALTH CARE EDUCATION/TRAINING PROGRAM
Payer: COMMERCIAL

## 2025-08-05 DIAGNOSIS — M54.2 CERVICAL PAIN: Primary | ICD-10-CM

## 2025-08-05 PROCEDURE — 97110 THERAPEUTIC EXERCISES: CPT | Mod: GP | Performed by: PHYSICAL THERAPIST

## 2025-08-05 PROCEDURE — 97112 NEUROMUSCULAR REEDUCATION: CPT | Mod: GP | Performed by: PHYSICAL THERAPIST

## 2025-08-17 DIAGNOSIS — I42.8 NONISCHEMIC CARDIOMYOPATHY (H): ICD-10-CM

## 2025-08-18 RX ORDER — LISINOPRIL 2.5 MG/1
2.5 TABLET ORAL DAILY
Qty: 90 TABLET | Refills: 2 | Status: SHIPPED | OUTPATIENT
Start: 2025-08-18

## 2025-08-26 ENCOUNTER — THERAPY VISIT (OUTPATIENT)
Dept: PHYSICAL THERAPY | Facility: CLINIC | Age: 65
End: 2025-08-26
Payer: COMMERCIAL

## 2025-08-26 DIAGNOSIS — M54.2 CERVICAL PAIN: Primary | ICD-10-CM

## 2025-08-26 PROCEDURE — 97110 THERAPEUTIC EXERCISES: CPT | Mod: GP | Performed by: PHYSICAL THERAPIST

## 2025-08-26 PROCEDURE — 97112 NEUROMUSCULAR REEDUCATION: CPT | Mod: GP | Performed by: PHYSICAL THERAPIST

## (undated) DEVICE — PACK LOWER EXTREMITY RIDGES

## (undated) DEVICE — ENDO TRAP POLYP QUICK CATCH 710201

## (undated) DEVICE — SU VICRYL 4-0 PS-2 18" UND J496H

## (undated) DEVICE — SU ETHILON 4-0 PS-2 18" BLACK 1667H

## (undated) DEVICE — KIT ENDO TURNOVER/PROCEDURE W/CLEAN A SCOPE LINERS 103888

## (undated) DEVICE — ENDO SNARE EXACTO COLD 9MM LOOP 2.4MMX230CM 00711115

## (undated) DEVICE — BAG CLEAR TRASH 1.3M 39X33" P4040C

## (undated) DEVICE — DRAPE C-ARM MINI 5423

## (undated) DEVICE — ESU GROUND PAD ADULT W/CORD E7507

## (undated) DEVICE — DRSG KERLIX FLUFFS X5

## (undated) DEVICE — BNDG ELASTIC 4"X5YDS UNSTERILE 6611-40

## (undated) DEVICE — LINEN HALF SHEET 5512

## (undated) DEVICE — SOL NACL 0.9% IRRIG 1000ML BOTTLE 2F7124

## (undated) DEVICE — LINEN TOWEL PACK X10 5473

## (undated) DEVICE — GLOVE BIOGEL PI SZ 7.5 40875

## (undated) DEVICE — LINEN FULL SHEET 5511

## (undated) DEVICE — CAST PADDING 4" STERILE 9044S

## (undated) RX ORDER — PROPOFOL 10 MG/ML
INJECTION, EMULSION INTRAVENOUS
Status: DISPENSED
Start: 2023-05-17

## (undated) RX ORDER — FENTANYL CITRATE 50 UG/ML
INJECTION, SOLUTION INTRAMUSCULAR; INTRAVENOUS
Status: DISPENSED
Start: 2020-11-04

## (undated) RX ORDER — LIDOCAINE HYDROCHLORIDE 10 MG/ML
INJECTION, SOLUTION EPIDURAL; INFILTRATION; INTRACAUDAL; PERINEURAL
Status: DISPENSED
Start: 2023-05-17

## (undated) RX ORDER — FENTANYL CITRATE 50 UG/ML
INJECTION, SOLUTION INTRAMUSCULAR; INTRAVENOUS
Status: DISPENSED
Start: 2023-05-17

## (undated) RX ORDER — CEFAZOLIN SODIUM/WATER 2 G/20 ML
SYRINGE (ML) INTRAVENOUS
Status: DISPENSED
Start: 2023-05-17

## (undated) RX ORDER — BUPIVACAINE HYDROCHLORIDE 2.5 MG/ML
INJECTION, SOLUTION EPIDURAL; INFILTRATION; INTRACAUDAL
Status: DISPENSED
Start: 2023-05-17

## (undated) RX ORDER — DEXAMETHASONE SODIUM PHOSPHATE 4 MG/ML
INJECTION, SOLUTION INTRA-ARTICULAR; INTRALESIONAL; INTRAMUSCULAR; INTRAVENOUS; SOFT TISSUE
Status: DISPENSED
Start: 2023-05-17

## (undated) RX ORDER — SIMETHICONE 40MG/0.6ML
SUSPENSION, DROPS(FINAL DOSAGE FORM)(ML) ORAL
Status: DISPENSED
Start: 2020-11-04

## (undated) RX ORDER — ONDANSETRON 2 MG/ML
INJECTION INTRAMUSCULAR; INTRAVENOUS
Status: DISPENSED
Start: 2023-05-17